# Patient Record
Sex: MALE | Race: WHITE | NOT HISPANIC OR LATINO | Employment: FULL TIME | URBAN - NONMETROPOLITAN AREA
[De-identification: names, ages, dates, MRNs, and addresses within clinical notes are randomized per-mention and may not be internally consistent; named-entity substitution may affect disease eponyms.]

---

## 2017-02-02 ENCOUNTER — APPOINTMENT (OUTPATIENT)
Dept: LAB | Facility: MEDICAL CENTER | Age: 47
End: 2017-02-02
Payer: COMMERCIAL

## 2017-02-02 ENCOUNTER — ALLSCRIPTS OFFICE VISIT (OUTPATIENT)
Dept: OTHER | Facility: OTHER | Age: 47
End: 2017-02-02

## 2017-02-02 ENCOUNTER — TRANSCRIBE ORDERS (OUTPATIENT)
Dept: LAB | Facility: MEDICAL CENTER | Age: 47
End: 2017-02-02

## 2017-02-02 DIAGNOSIS — J06.9 ACUTE UPPER RESPIRATORY INFECTIONS OF OTHER MULTIPLE SITES: Primary | ICD-10-CM

## 2017-02-02 DIAGNOSIS — J06.9 ACUTE UPPER RESPIRATORY INFECTION: ICD-10-CM

## 2017-02-02 DIAGNOSIS — J06.9 ACUTE UPPER RESPIRATORY INFECTIONS OF OTHER MULTIPLE SITES: ICD-10-CM

## 2017-02-02 LAB
FLUAV AG SPEC QL: ABNORMAL
FLUBV AG SPEC QL: ABNORMAL
RSV B RNA SPEC QL NAA+PROBE: DETECTED

## 2017-02-02 PROCEDURE — 87798 DETECT AGENT NOS DNA AMP: CPT

## 2017-02-03 ENCOUNTER — GENERIC CONVERSION - ENCOUNTER (OUTPATIENT)
Dept: OTHER | Facility: OTHER | Age: 47
End: 2017-02-03

## 2017-06-29 ENCOUNTER — ALLSCRIPTS OFFICE VISIT (OUTPATIENT)
Dept: OTHER | Facility: OTHER | Age: 47
End: 2017-06-29

## 2017-11-13 ENCOUNTER — OFFICE VISIT (OUTPATIENT)
Dept: URGENT CARE | Facility: CLINIC | Age: 47
End: 2017-11-13
Payer: COMMERCIAL

## 2017-11-13 PROCEDURE — 99203 OFFICE O/P NEW LOW 30 MIN: CPT

## 2017-11-14 NOTE — PROGRESS NOTES
Assessment    1  BPV (benign positional vertigo) (386 11) (H81 10)    Plan  BPV (benign positional vertigo)    · Meclizine HCl - 25 MG Oral Tablet; 1/2 to 1 tablet every 6-8 hours as needed fordizziness   · Avoid alcoholic beverages ; Status:Complete;   Done: [id number]    Discussion/Summary  Discussion Summary: You should not drive while you are having the vertigo attacks  If symptoms persists past a few days, follow up with an ENT specialist    Medication Side Effects Reviewed: Possible side effects of new medications were reviewed with the patient/guardian today  Understands and agrees with treatment plan: The treatment plan was reviewed with the patient/guardian  The patient/guardian understands and agrees with the treatment plan   Follow Up Instructions: Follow Up with your Primary Care Provider in 4-5 days  If your symptoms worsen, go to the nearest [place] [address] Emergency Department  Chief Complaint    1  Dizziness  Chief Complaint Free Text Note Form: C/O dizziness  Started last night  Positional in nature      History of Present Illness  HPI: C/O dizziness  Started last night  Positional in nature  Started last evening when he woke up in the middle of the night and went from lying to sitting, he felt the room spinning  When he came back from the bathroom he sat down felt okay then laid down and felt the room spinning  This morning he noticed that it is the worse whenever he turns his head to the left  He does not feel lightheaded or like he is going to pass out  He does experience some nausea with the dizziness  Hospital Based Practices Required Assessment:  Abuse And Domestic Violence Screen   Yes, the patient is safe at home  -- The patient states no one is hurting them  Depression And Suicide Screen  No, the patient has not had thoughts of hurting themself  No, the patient has not felt depressed in the past 7 days  Prefered Language is  [place]  Primary Language is  English        Review of Systems  Focused-Male:  Constitutional: no fever or chills, feels well, no tiredness, no recent weight loss or weight gain  ENT: no complaints of earache, no loss of hearing, no nosebleeds or nasal discharge, no sore throat or hoarseness  Cardiovascular: no complaints of slow or fast heart rate, no chest pain, no palpitations, no leg claudication or lower extremity edema  Respiratory: no complaints of shortness of breath, no wheezing or cough, no dyspnea on exertion, no orthopnea or PND  Neurological: dizziness, but-- as noted in HPI  Active Problems    1  Benign non-nodular prostatic hyperplasia with lower urinary tract symptoms (600 91) (N40 1)   2  Benign prostatic hypertrophy (600 00) (N40 0)   3  Coccydynia (724 79) (M53 3)   4  Contact dermatitis due to poison ivy (692 6) (L23 7)   5  Depression screening (V79 0) (Z13 89)   6  Dermatitis (692 9) (L30 9)   7  Erectile dysfunction of non-organic origin (302 72) (F52 21)   8  Fatigue (780 79) (R53 83)   9  High risk medication use (V58 69) (Z79 899)   10  Hyperlipidemia (272 4) (E78 5)   11  Low testosterone (259 9) (E34 9)   12  Nausea (787 02) (R11 0)   13  Need for influenza vaccination (V04 81) (Z23)   14  Tarsal tunnel syndrome, unspecified laterality (355 5) (G57 50)   15  Vitamin D deficiency (268 9) (E55 9)    Past Medical History  1  History of Acute medial meniscus tear of left knee (836 0) (S83 242A)   2  History of Acute upper respiratory infection (465 9) (J06 9)   3  History of Allergy to insects (V15 06) (Z91 038)   4  History of Bee sting (989 5,E905 3) (T63 441A)   5  History of Contact dermatitis (692 9) (L25 9)   6  History of Fatigue (780 79) (R53 83)   7  History of Finger infection (686 9) (L08 9)   8  History of back pain (V13 59) (Z87 39)   9  History of herpes zoster (V12 09) (Z86 19)  Active Problems And Past Medical History Reviewed: The active problems and past medical history were reviewed and updated today        Family History  Mother    1  Family history of Type 2 diabetes mellitus (250 00) (E11 9)  Father    2  Family history of Esophageal Cancer (150 9)   3  Family history of hypertension (V17 49) (Z82 49)   4  Family history of Prostate cancer (80) (C61)  Family History Reviewed: The family history was reviewed and updated today  Social History   · Denied: Drug use (305 90) (F19 90)   · Never a smoker   · Never Drank Alcohol  Social History Reviewed: The social history was reviewed and updated today  Surgical History  1  History of Appendectomy   2  History of Knee Surgery   3  History of Oral Surgery Tooth Extraction   4  History of Tonsillectomy With Adenoidectomy  Surgical History Reviewed: The surgical history was reviewed and updated today  Current Meds   1  No Reported Medications Recorded  Medication List Reviewed: The medication list was reviewed and updated today  Allergies    1  No Known Drug Allergies    Vitals  Signs   Recorded: 06YEH7606 11:23AM   Temperature: 98 2 F  Heart Rate: 68  Respiration: 18  Systolic: 578  Diastolic: 88  Height: 6 ft 2 in  Weight: 228 lb   BMI Calculated: 29 27  BSA Calculated: 2 3  O2 Saturation: 99    Physical Exam   Constitutional  General appearance: No acute distress, well appearing and well nourished  Ears, Nose, Mouth, and Throat  External inspection of ears and nose: Normal    Otoscopic examination: Tympanic membrance translucent with normal light reflex  Canals patent without erythema  Nasal mucosa, septum, and turbinates: Normal without edema or erythema  Oropharynx: Normal with no erythema, edema, exudate or lesions  Pulmonary  Respiratory effort: No increased work of breathing or signs of respiratory distress  Auscultation of lungs: Clear to auscultation  Cardiovascular  Auscultation of heart: Normal rate and rhythm, normal S1 and S2, without murmurs     Neurologic  Sensation: Abnormal  -- Positive Amber-Hallpike whenever he turns his head to the left  Message  Return to work or school:   Maxine Daniels is under my professional care   He was seen in my office on 11/13/17             Signatures   Electronically signed by : CHRISTIAN Khanna ; Nov 13 2017  4:14PM EST                       (Author)

## 2017-12-07 ENCOUNTER — APPOINTMENT (EMERGENCY)
Dept: CT IMAGING | Facility: HOSPITAL | Age: 47
DRG: 694 | End: 2017-12-07
Payer: COMMERCIAL

## 2017-12-07 ENCOUNTER — APPOINTMENT (EMERGENCY)
Dept: ULTRASOUND IMAGING | Facility: HOSPITAL | Age: 47
DRG: 694 | End: 2017-12-07
Payer: COMMERCIAL

## 2017-12-07 ENCOUNTER — HOSPITAL ENCOUNTER (INPATIENT)
Facility: HOSPITAL | Age: 47
LOS: 1 days | Discharge: HOME | DRG: 694 | End: 2017-12-08
Attending: EMERGENCY MEDICINE | Admitting: FAMILY MEDICINE
Payer: COMMERCIAL

## 2017-12-07 ENCOUNTER — APPOINTMENT (EMERGENCY)
Dept: RADIOLOGY | Facility: HOSPITAL | Age: 47
DRG: 694 | End: 2017-12-07
Payer: COMMERCIAL

## 2017-12-07 DIAGNOSIS — N17.9 AKI (ACUTE KIDNEY INJURY) (HCC): ICD-10-CM

## 2017-12-07 DIAGNOSIS — N13.2 URETERAL STONE WITH HYDRONEPHROSIS: ICD-10-CM

## 2017-12-07 DIAGNOSIS — N13.30 HYDRONEPHROSIS: ICD-10-CM

## 2017-12-07 DIAGNOSIS — R77.8 ELEVATED TROPONIN: ICD-10-CM

## 2017-12-07 DIAGNOSIS — N17.9 ACUTE RENAL INJURY (HCC): Primary | ICD-10-CM

## 2017-12-07 DIAGNOSIS — R52 INTRACTABLE PAIN: ICD-10-CM

## 2017-12-07 LAB
ALBUMIN SERPL BCP-MCNC: 3.8 G/DL (ref 3.5–5)
ALP SERPL-CCNC: 86 U/L (ref 46–116)
ALT SERPL W P-5'-P-CCNC: 72 U/L (ref 12–78)
ANION GAP SERPL CALCULATED.3IONS-SCNC: 8 MMOL/L (ref 4–13)
AST SERPL W P-5'-P-CCNC: 30 U/L (ref 5–45)
BACTERIA UR QL AUTO: ABNORMAL /HPF
BASOPHILS # BLD AUTO: 0.03 THOUSANDS/ΜL (ref 0–0.1)
BASOPHILS NFR BLD AUTO: 0 % (ref 0–1)
BILIRUB SERPL-MCNC: 1.1 MG/DL (ref 0.2–1)
BILIRUB UR QL STRIP: NEGATIVE
BUN SERPL-MCNC: 24 MG/DL (ref 5–25)
CALCIUM SERPL-MCNC: 9.2 MG/DL (ref 8.3–10.1)
CHLORIDE SERPL-SCNC: 100 MMOL/L (ref 100–108)
CLARITY UR: CLEAR
CO2 SERPL-SCNC: 29 MMOL/L (ref 21–32)
COLOR UR: YELLOW
CREAT SERPL-MCNC: 2.06 MG/DL (ref 0.6–1.3)
EOSINOPHIL # BLD AUTO: 0.08 THOUSAND/ΜL (ref 0–0.61)
EOSINOPHIL NFR BLD AUTO: 1 % (ref 0–6)
ERYTHROCYTE [DISTWIDTH] IN BLOOD BY AUTOMATED COUNT: 12.9 % (ref 11.6–15.1)
GFR SERPL CREATININE-BSD FRML MDRD: 38 ML/MIN/1.73SQ M
GLUCOSE SERPL-MCNC: 103 MG/DL (ref 65–140)
GLUCOSE UR STRIP-MCNC: NEGATIVE MG/DL
HCT VFR BLD AUTO: 44.2 % (ref 36.5–49.3)
HGB BLD-MCNC: 15.6 G/DL (ref 12–17)
HGB UR QL STRIP.AUTO: NORMAL
KETONES UR STRIP-MCNC: NEGATIVE MG/DL
LACTATE SERPL-SCNC: 1.4 MMOL/L (ref 0.5–2)
LEUKOCYTE ESTERASE UR QL STRIP: NEGATIVE
LIPASE SERPL-CCNC: 112 U/L (ref 73–393)
LYMPHOCYTES # BLD AUTO: 1.52 THOUSANDS/ΜL (ref 0.6–4.47)
LYMPHOCYTES NFR BLD AUTO: 15 % (ref 14–44)
MCH RBC QN AUTO: 31.1 PG (ref 26.8–34.3)
MCHC RBC AUTO-ENTMCNC: 35.3 G/DL (ref 31.4–37.4)
MCV RBC AUTO: 88 FL (ref 82–98)
MONOCYTES # BLD AUTO: 1.11 THOUSAND/ΜL (ref 0.17–1.22)
MONOCYTES NFR BLD AUTO: 11 % (ref 4–12)
NEUTROPHILS # BLD AUTO: 7.4 THOUSANDS/ΜL (ref 1.85–7.62)
NEUTS SEG NFR BLD AUTO: 73 % (ref 43–75)
NITRITE UR QL STRIP: NEGATIVE
NON-SQ EPI CELLS URNS QL MICRO: ABNORMAL /HPF
PH UR STRIP.AUTO: 6 [PH] (ref 4.5–8)
PLATELET # BLD AUTO: 172 THOUSANDS/UL (ref 149–390)
PLATELET # BLD AUTO: 190 THOUSANDS/UL (ref 149–390)
PMV BLD AUTO: 10.7 FL (ref 8.9–12.7)
PMV BLD AUTO: 10.7 FL (ref 8.9–12.7)
POTASSIUM SERPL-SCNC: 4.5 MMOL/L (ref 3.5–5.3)
PROT SERPL-MCNC: 7.5 G/DL (ref 6.4–8.2)
PROT UR STRIP-MCNC: NEGATIVE MG/DL
RBC # BLD AUTO: 5.02 MILLION/UL (ref 3.88–5.62)
RBC #/AREA URNS AUTO: ABNORMAL /HPF
SODIUM SERPL-SCNC: 137 MMOL/L (ref 136–145)
SP GR UR STRIP.AUTO: 1.01 (ref 1–1.03)
TROPONIN I SERPL-MCNC: 0.05 NG/ML
TROPONIN I SERPL-MCNC: 0.05 NG/ML
TROPONIN I SERPL-MCNC: 0.06 NG/ML
UROBILINOGEN UR QL STRIP.AUTO: 0.2 E.U./DL
WBC # BLD AUTO: 10.14 THOUSAND/UL (ref 4.31–10.16)
WBC #/AREA URNS AUTO: ABNORMAL /HPF

## 2017-12-07 PROCEDURE — 83605 ASSAY OF LACTIC ACID: CPT | Performed by: EMERGENCY MEDICINE

## 2017-12-07 PROCEDURE — 96374 THER/PROPH/DIAG INJ IV PUSH: CPT

## 2017-12-07 PROCEDURE — 93005 ELECTROCARDIOGRAM TRACING: CPT | Performed by: EMERGENCY MEDICINE

## 2017-12-07 PROCEDURE — 76870 US EXAM SCROTUM: CPT

## 2017-12-07 PROCEDURE — 81001 URINALYSIS AUTO W/SCOPE: CPT | Performed by: EMERGENCY MEDICINE

## 2017-12-07 PROCEDURE — 82570 ASSAY OF URINE CREATININE: CPT | Performed by: FAMILY MEDICINE

## 2017-12-07 PROCEDURE — 84484 ASSAY OF TROPONIN QUANT: CPT | Performed by: EMERGENCY MEDICINE

## 2017-12-07 PROCEDURE — 85049 AUTOMATED PLATELET COUNT: CPT | Performed by: FAMILY MEDICINE

## 2017-12-07 PROCEDURE — 85025 COMPLETE CBC W/AUTO DIFF WBC: CPT | Performed by: EMERGENCY MEDICINE

## 2017-12-07 PROCEDURE — 83690 ASSAY OF LIPASE: CPT | Performed by: EMERGENCY MEDICINE

## 2017-12-07 PROCEDURE — 99285 EMERGENCY DEPT VISIT HI MDM: CPT

## 2017-12-07 PROCEDURE — 84300 ASSAY OF URINE SODIUM: CPT | Performed by: FAMILY MEDICINE

## 2017-12-07 PROCEDURE — 96375 TX/PRO/DX INJ NEW DRUG ADDON: CPT

## 2017-12-07 PROCEDURE — 36415 COLL VENOUS BLD VENIPUNCTURE: CPT | Performed by: EMERGENCY MEDICINE

## 2017-12-07 PROCEDURE — 96361 HYDRATE IV INFUSION ADD-ON: CPT

## 2017-12-07 PROCEDURE — 70450 CT HEAD/BRAIN W/O DYE: CPT

## 2017-12-07 PROCEDURE — 71020 HB CHEST X-RAY 2VW FRONTAL&LATL: CPT

## 2017-12-07 PROCEDURE — 74176 CT ABD & PELVIS W/O CONTRAST: CPT

## 2017-12-07 PROCEDURE — 84484 ASSAY OF TROPONIN QUANT: CPT | Performed by: FAMILY MEDICINE

## 2017-12-07 PROCEDURE — 80053 COMPREHEN METABOLIC PANEL: CPT | Performed by: EMERGENCY MEDICINE

## 2017-12-07 RX ORDER — ONDANSETRON 2 MG/ML
4 INJECTION INTRAMUSCULAR; INTRAVENOUS EVERY 4 HOURS PRN
Status: DISCONTINUED | OUTPATIENT
Start: 2017-12-07 | End: 2017-12-08 | Stop reason: HOSPADM

## 2017-12-07 RX ORDER — ONDANSETRON 2 MG/ML
4 INJECTION INTRAMUSCULAR; INTRAVENOUS ONCE
Status: COMPLETED | OUTPATIENT
Start: 2017-12-07 | End: 2017-12-07

## 2017-12-07 RX ORDER — SODIUM CHLORIDE 9 MG/ML
100 INJECTION, SOLUTION INTRAVENOUS CONTINUOUS
Status: DISCONTINUED | OUTPATIENT
Start: 2017-12-07 | End: 2017-12-08 | Stop reason: HOSPADM

## 2017-12-07 RX ORDER — ACETAMINOPHEN 325 MG/1
650 TABLET ORAL EVERY 6 HOURS PRN
Status: DISCONTINUED | OUTPATIENT
Start: 2017-12-07 | End: 2017-12-08 | Stop reason: HOSPADM

## 2017-12-07 RX ORDER — CEFACLOR 250 MG
250 CAPSULE ORAL 3 TIMES DAILY
COMMUNITY
End: 2017-12-08 | Stop reason: HOSPADM

## 2017-12-07 RX ORDER — TAMSULOSIN HYDROCHLORIDE 0.4 MG/1
0.4 CAPSULE ORAL
Status: DISCONTINUED | OUTPATIENT
Start: 2017-12-07 | End: 2017-12-08 | Stop reason: HOSPADM

## 2017-12-07 RX ORDER — MAGNESIUM SULFATE HEPTAHYDRATE 40 MG/ML
2 INJECTION, SOLUTION INTRAVENOUS ONCE
Status: COMPLETED | OUTPATIENT
Start: 2017-12-07 | End: 2017-12-07

## 2017-12-07 RX ORDER — HEPARIN SODIUM 5000 [USP'U]/ML
5000 INJECTION, SOLUTION INTRAVENOUS; SUBCUTANEOUS EVERY 8 HOURS SCHEDULED
Status: DISCONTINUED | OUTPATIENT
Start: 2017-12-07 | End: 2017-12-08 | Stop reason: HOSPADM

## 2017-12-07 RX ORDER — KETOROLAC TROMETHAMINE 10 MG/1
10 TABLET, FILM COATED ORAL EVERY 6 HOURS PRN
COMMUNITY
End: 2017-12-08 | Stop reason: HOSPADM

## 2017-12-07 RX ADMIN — HYDROMORPHONE HYDROCHLORIDE 1 MG: 1 INJECTION, SOLUTION INTRAMUSCULAR; INTRAVENOUS; SUBCUTANEOUS at 23:26

## 2017-12-07 RX ADMIN — TAMSULOSIN HYDROCHLORIDE 0.4 MG: 0.4 CAPSULE ORAL at 17:48

## 2017-12-07 RX ADMIN — SODIUM CHLORIDE 1000 ML: 0.9 INJECTION, SOLUTION INTRAVENOUS at 15:22

## 2017-12-07 RX ADMIN — HEPARIN SODIUM 5000 UNITS: 5000 INJECTION, SOLUTION INTRAVENOUS; SUBCUTANEOUS at 22:13

## 2017-12-07 RX ADMIN — ACETAMINOPHEN 650 MG: 325 TABLET, FILM COATED ORAL at 19:43

## 2017-12-07 RX ADMIN — SODIUM CHLORIDE 100 ML/HR: 0.9 INJECTION, SOLUTION INTRAVENOUS at 19:44

## 2017-12-07 RX ADMIN — HYDROMORPHONE HYDROCHLORIDE 1 MG: 1 INJECTION, SOLUTION INTRAMUSCULAR; INTRAVENOUS; SUBCUTANEOUS at 17:10

## 2017-12-07 RX ADMIN — MAGNESIUM SULFATE HEPTAHYDRATE 2 G: 40 INJECTION, SOLUTION INTRAVENOUS at 19:44

## 2017-12-07 RX ADMIN — HYDROMORPHONE HYDROCHLORIDE 1 MG: 1 INJECTION, SOLUTION INTRAMUSCULAR; INTRAVENOUS; SUBCUTANEOUS at 15:22

## 2017-12-07 RX ADMIN — ONDANSETRON 4 MG: 2 INJECTION INTRAMUSCULAR; INTRAVENOUS at 15:22

## 2017-12-07 NOTE — ED NOTES
Pt transported to [address] [place] [address] [address] at this time        Ijeoma Copeland, RN  12/07/17 4619

## 2017-12-07 NOTE — ED NOTES
Pt in bed resting comfortably no complaints at this time  Call bell in reach   Side rails up x2     Keith Haynes RN  12/07/17 7937

## 2017-12-07 NOTE — PROGRESS NOTES
56year old male with a known right sided stone diagnosed at an outside hospital   Presents today with abdominal pain  CT scan demonstrates 3 mm right distal stone with resultant hydroureteronephrosis and likely forniceal rupture  The patient has having some pain which is controlled with medications  He is afebrile  His white count is 10 but his creatinine is slightly elevated at 2  He will be admitted for IV fluid hydration  His last and full solid meal was at 3:00 p m  and there are no urgent or emergent signs at this time to prompt emergent decompression  The patient will be kept NPO after midnight and decision about stenting will be reviewed tomorrow  If the patient becomes febrile or demonstrate any signs of sepsis, Urology should be contacted immediately  His urine does not show signs of infection and the patient will not be started on antibiotics at this time   Reviewed plan with Dr Edward Sweeney who will convey to [place] team

## 2017-12-07 NOTE — H&P
H&P Exam - Magaly Salinas 56 y o  male MRN: [id number]    Unit/Bed#: RM04 Encounter: [id number]    Assessment:    Right Hydroureteronephrosis and probable calyx rupture  Case discussed with urology  Pain control, IVF, flomax  NPO after midnight for possible stenting tomorrow   If he develops any febrile episodes or signs of sepsis Urology will be contacted immediately, empiric antibiotics and blood cultures to be drawn  Will also start Flomax   Per urology no seo indicated for now     YVONNE   Likely 2/2 #1  IVF, obtain urine sodium and creatinine     Elevated troponin  Likely demand ischemia will trend x 2 and obtain echocardiogram in am  EKG reviewed non ischemic, but shows posterior fasicular block and NSR     Liver lesion  RUQ Ultrasound    Migraine headache  Will trial 2 gram Magnesium, IVF,  Tylenol      LOS > 2 midnights  Full Code  Heparin DVT Prophylaxis       History of Present Illness      The patient is a pleasant 56year old male with no significant pmh who presents today with a chief complaint of right sided flank pain  He was seen in [place] on Monday and was noted to have a 3mm non obstructing stone, he was sent home on cefaclor and ketoralc  He's denying any fevers or chills, had some nausea but no vomiting  Complains of not being able to find comfortable position, pain respond well to dilaudid and he's currently resting comfortably  Case was discussed with ER physician and reviewed with Urology  Review of Systems   Gastrointestinal: Positive for nausea  Musculoskeletal: Positive for back pain  Historical Information   History reviewed  No pertinent past medical history    Past Surgical History:   Procedure Laterality Date    APPENDECTOMY      TONSILLECTOMY      WISDOM TOOTH EXTRACTION       Social History   History   Alcohol Use No     History   Drug Use No     History   Smoking Status    Never Smoker   Smokeless Tobacco    Never Used     Family History: non-contributory    Meds/Allergies   all medications and allergies reviewed  No Known Allergies    Objective   First Vitals:   Blood Pressure: (!) 171/98 (12/07/17 1449)  Pulse: 67 (12/07/17 1449)  Temperature: 98 4 °F (36 9 °C) (12/07/17 1449)  Temp Source: Temporal (12/07/17 1449)  Respirations: 20 (12/07/17 1449)  Height: 6' 2" (188 cm) (12/07/17 1449)  Weight - Scale: 103 kg (227 lb 8 oz) (12/07/17 1449)  SpO2: 100 % (12/07/17 1449)    Current Vitals:   Blood Pressure: (!) 171/98 (12/07/17 1449)  Pulse: 65 (12/07/17 1715)  Temperature: 98 4 °F (36 9 °C) (12/07/17 1449)  Temp Source: Temporal (12/07/17 1449)  Respirations: 21 (12/07/17 1715)  Height: 6' 2" (188 cm) (12/07/17 1449)  Weight - Scale: 103 kg (227 lb 8 oz) (12/07/17 1449)  SpO2: 100 % (12/07/17 1715)    No intake or output data in the 24 hours ending 12/07/17 1746    Invasive Devices     Peripheral Intravenous Line            Peripheral IV 12/07/17 Left;Ventral (anterior) Antecubital less than 1 day                Physical Exam   Constitutional: He is oriented to person, place, and time  He appears well-developed  No distress  HENT:   Head: Normocephalic and atraumatic  Eyes: No scleral icterus  Neck: Neck supple  No JVD present  Cardiovascular: Normal rate and regular rhythm  No murmur heard  Pulmonary/Chest: Effort normal and breath sounds normal  No respiratory distress  He has no wheezes  He has no rales  Abdominal: Soft  Bowel sounds are normal  He exhibits no distension  There is no tenderness  There is no rebound  Musculoskeletal: Normal range of motion  He exhibits no edema  Right CVA tenderness    Neurological: He is alert and oriented to person, place, and time  No cranial nerve deficit  Coordination normal        Lab Results:   Lab Results   Component Value Date    WBC 10 14 12/07/2017    HGB 15 6 12/07/2017    HCT 44 2 12/07/2017    MCV 88 12/07/2017     12/07/2017       [facility]    Imaging:   CT abdomen pelvis wo contrast   Final Result      3 mm obstructing right distal ureteral calculus is identified causing right hydroureteronephrosis  There is significant stranding /fluid about the right kidney within the perinephric space extending inferiorly along the right    retroperitoneum/extraperitoneal to the right psoas and iliopsoas muscle  These findings could relate to edema from a ruptured calyx or relate to infectious/inflammatory process such as pyelonephritis/ureteritis  Correlate clinically  No free air or lymphadenopathy  Trace amount of free fluid noted in the pelvis is nonspecific but abnormal for a male patient  Incomplete characterized 3 4 cm right hepatic lobe hypodense lesion, correlate with right upper quadrant ultrasound  Workstation performed: [id number]         XR chest 2 views   ED Interpretation   no infiltrate       Final Result      No active pulmonary disease  Workstation performed: [id number]         CT head without contrast   Final Result      No acute intracranial abnormality  Workstation performed: [id number]         US scrotum and testicles   Final Result       1  No acute scrotal findings  Specifically, no evidence for torsion  2   Isolated right-sided varicocele  Classical teaching says that unilateral right varicocele should raise concern for intra-abdominal mass compressing the IVC (though this is rarely the case, in practice)  Recommend obtaining the results from the    patient's recent outside CT scan             Workstation performed: [id number]             EKG, Pathology, and Other Studies: NSR Left posterior fasicular block     Code Status: No Order  Advance Directive and Living Will:      Power of :    POLST:      Counseling / Coordination of Care:

## 2017-12-07 NOTE — ED PROVIDER NOTES
History  Chief Complaint   Patient presents with    Dizziness     Dizzy and weak since last night, blurred vision    Abdominal Pain     RLQ pain, was seen in ED and dx with kidney stone, increased pain now     52-year-old male presents with multiple complaints today  Patient states that on Monday evening (4 days ago) he began with pain in the right flank which radiated into his right testicle  He states that he was seen at [facility] for this had a testicular ultrasound which was negative and a CT scan which demonstrated a 2 mm kidney stone in his right ureter  The patient also states that later that night into Tuesday he started with a headache  Patient does have history of migraines and states this is similar to previous migraines  For who the headache is making him feel dizzy and light sensitive  He has no focal neurological deficit  History provided by:  Patient  Dizziness   Description: Headache  Duration:  4 days  Timing:  Constant  Chronicity:  Recurrent  Associated symptoms: headaches    Associated symptoms: no chest pain    Abdominal Pain   Pain location:  RLQ  Pain quality: aching, cramping and pressure    Pain radiates to:  RLQ and scrotum  Pain severity:  Moderate  Onset quality:  Gradual  Duration:  4 days  Timing:  Constant  Progression:  Worsening  Chronicity:  New  Context: not alcohol use, not awakening from sleep, not diet changes, not eating and not laxative use    Worsened by:  Nothing  Ineffective treatments:  Not moving and OTC medications  Associated symptoms: no anorexia, no chest pain, no chills and no dysuria    Risk factors: no alcohol abuse, no aspirin use and not elderly        Prior to Admission Medications   Prescriptions Last Dose Informant Patient Reported? Taking?    cefaclor (CECLOR) 250 mg capsule   Yes Yes   Sig: Take 250 mg by mouth 3 (three) times a day   ketorolac (TORADOL) 10 mg tablet 12/7/2017 at 0600  Yes Yes   Sig: Take 10 mg by mouth every 6 (six) hours as needed for moderate pain      Facility-Administered Medications: None       History reviewed  No pertinent past medical history  Past Surgical History:   Procedure Laterality Date    APPENDECTOMY      TONSILLECTOMY      WISDOM TOOTH EXTRACTION         History reviewed  No pertinent family history  I have reviewed and agree with the history as documented  Social History   Substance Use Topics    Smoking status: Never Smoker    Smokeless tobacco: Never Used    Alcohol use No        Review of Systems   Constitutional: Negative for activity change, appetite change, chills and diaphoresis  HENT: Negative for congestion, dental problem, drooling and ear discharge  Eyes: Negative for pain, discharge and itching  Respiratory: Negative for apnea, choking and chest tightness  Cardiovascular: Negative for chest pain and leg swelling  Gastrointestinal: Positive for abdominal pain  Negative for anorexia  Endocrine: Negative for cold intolerance, heat intolerance and polydipsia  Genitourinary: Positive for testicular pain  Negative for difficulty urinating, dysuria, enuresis, flank pain and scrotal swelling  Musculoskeletal: Negative for arthralgias, back pain and gait problem  Skin: Negative for color change and pallor  Allergic/Immunologic: Negative for environmental allergies and food allergies  Neurological: Positive for headaches  Negative for dizziness  Hematological: Negative for adenopathy  Psychiatric/Behavioral: Negative for agitation, behavioral problems, confusion and decreased concentration         Physical Exam  ED Triage Vitals [12/07/17 1449]   Temperature Pulse Respirations Blood Pressure SpO2   98 4 °F (36 9 °C) 67 20 (!) 171/98 100 %      Temp Source Heart Rate Source Patient Position - Orthostatic VS BP Location FiO2 (%)   Temporal Monitor Lying Left arm --      Pain Score       9           Orthostatic Vital Signs  Vitals:    12/07/17 1515 12/07/17 1530 12/07/17 1545 12/07/17 1700   BP:       Pulse: 71 73 65 59   Patient Position - Orthostatic VS:           Physical Exam   Constitutional: He appears well-developed and well-nourished  HENT:   Head: Normocephalic and atraumatic  Right Ear: External ear normal    Left Ear: External ear normal    Eyes: Pupils are equal, round, and reactive to light  Right eye exhibits no discharge  Left eye exhibits no discharge  Neck: Normal range of motion  No JVD present  No tracheal deviation present  No thyromegaly present  Cardiovascular: Normal rate, regular rhythm and normal heart sounds  Exam reveals no friction rub  No murmur heard  Pulmonary/Chest: Effort normal  No respiratory distress  He has no wheezes  Abdominal: He exhibits no distension, no pulsatile liver, no fluid wave and no ascites  There is no hepatosplenomegaly  There is tenderness in the right lower quadrant  There is CVA tenderness  Hernia confirmed negative in the right inguinal area and confirmed negative in the left inguinal area  Genitourinary: Cremasteric reflex is present  Right testis shows tenderness  Right testis shows no mass and no swelling  Cremasteric reflex is not absent on the right side  Circumcised  Musculoskeletal: Normal range of motion  He exhibits no edema  Neurological: He is alert  He displays normal reflexes  No cranial nerve deficit  Coordination normal    Skin: Skin is warm  Capillary refill takes less than 2 seconds  No erythema  No pallor  Psychiatric: He has a normal mood and affect  His behavior is normal  Thought content normal    Vitals reviewed        ED Medications  Medications   sodium chloride 0 9 % bolus 1,000 mL (0 mL Intravenous Stopped 12/7/17 1709)   ondansetron (ZOFRAN) injection 4 mg (4 mg Intravenous Given 12/7/17 1522)   HYDROmorphone (DILAUDID) 1 mg/mL injection 1 mg (1 mg Intravenous Given 12/7/17 1522)   HYDROmorphone (DILAUDID) 1 mg/mL injection 1 mg (1 mg Intravenous Given 12/7/17 1710) Diagnostic Studies  Results Reviewed     Procedure Component Value Units Date/Time    Urine Microscopic [[id number]]  (Abnormal) Collected:  12/07/17 1636    Lab Status:  Final result Specimen:  Urine from Urine, Clean Catch Updated:  12/07/17 1659     RBC, UA 0-1 (A) /hpf      WBC, UA None Seen /hpf      Epithelial Cells Occasional /hpf      Bacteria, UA Occasional /hpf     UA w Reflex to Microscopic w Reflex to Culture [[id number]]  (Normal) Collected:  12/07/17 1636    Lab Status:  Final result Specimen:  Urine from Urine, Clean Catch Updated:  12/07/17 1651     Color, UA Yellow     Clarity, UA Clear     Specific Gravity, UA 1 010     pH, UA 6 0     Leukocytes, UA Negative     Nitrite, UA Negative     Protein, UA Negative mg/dl      Glucose, UA Negative mg/dl      Ketones, UA Negative mg/dl      Urobilinogen, UA 0 2 E U /dl      Bilirubin, UA Negative     Blood, UA Trace-Intact    Troponin I [[id number]]  (Abnormal) Collected:  12/07/17 1518    Lab Status:  Final result Specimen:  Blood from Arm, Left Updated:  12/07/17 1608     Troponin I 0 05 (HH) ng/mL     Narrative:         [employer] Chemistry analyzer 99% cutoff is > 0 04 ng/mL in network labs    o cTnI 99% cutoff is useful only when applied to patients in the clinical setting of myocardial ischemia  o cTnI 99% cutoff should be interpreted in the context of clinical history, ECG findings and possibly cardiac imaging to establish correct diagnosis  o cTnI 99% cutoff may be suggestive but clearly not indicative of a coronary event without the clinical setting of myocardial ischemia  Lactic acid, plasma [[id number]]  (Normal) Collected:  12/07/17 1518    Lab Status:  Final result Specimen:  Blood from Arm, Left Updated:  12/07/17 1549     LACTIC ACID 1 4 mmol/L     Narrative:         Result may be elevated if tourniquet was used during collection      Comprehensive metabolic panel [[id number]]  (Abnormal) Collected:  12/07/17 1518    Lab Status:  Final result Specimen:  Blood from Arm, Left Updated:  12/07/17 1546     Sodium 137 mmol/L      Potassium 4 5 mmol/L      Chloride 100 mmol/L      CO2 29 mmol/L      Anion Gap 8 mmol/L      BUN 24 mg/dL      Creatinine 2 06 (H) mg/dL      Glucose 103 mg/dL      Calcium 9 2 mg/dL      AST 30 U/L      ALT 72 U/L      Alkaline Phosphatase 86 U/L      Total Protein 7 5 g/dL      Albumin 3 8 g/dL      Total Bilirubin 1 10 (H) mg/dL      eGFR 38 ml/min/1 73sq m     Narrative:         National Kidney Disease Education Program recommendations are as follows:  GFR calculation is accurate only with a steady state creatinine  Chronic Kidney disease less than 60 ml/min/1 73 sq  meters  Kidney failure less than 15 ml/min/1 73 sq  meters  Lipase [[id number]]  (Normal) Collected:  12/07/17 1518    Lab Status:  Final result Specimen:  Blood from Arm, Left Updated:  12/07/17 1546     Lipase 112 u/L     CBC and differential [[id number]]  (Normal) Collected:  12/07/17 1518    Lab Status:  Final result Specimen:  Blood from Arm, Left Updated:  12/07/17 1530     WBC 10 14 Thousand/uL      RBC 5 02 Million/uL      Hemoglobin 15 6 g/dL      Hematocrit 44 2 %      MCV 88 fL      MCH 31 1 pg      MCHC 35 3 g/dL      RDW 12 9 %      MPV 10 7 fL      Platelets 745 Thousands/uL      Neutrophils Relative 73 %      Lymphocytes Relative 15 %      Monocytes Relative 11 %      Eosinophils Relative 1 %      Basophils Relative 0 %      Neutrophils Absolute 7 40 Thousands/µL      Lymphocytes Absolute 1 52 Thousands/µL      Monocytes Absolute 1 11 Thousand/µL      Eosinophils Absolute 0 08 Thousand/µL      Basophils Absolute 0 03 Thousands/µL                  CT abdomen pelvis wo contrast   Final Result by Zhane Arreola MD (12/07 1659)      3 mm obstructing right distal ureteral calculus is identified causing right hydroureteronephrosis   There is significant stranding /fluid about the right kidney within the perinephric space extending inferiorly along the right retroperitoneum/extraperitoneal to the right psoas and iliopsoas muscle  These findings could relate to edema from a ruptured calyx or relate to infectious/inflammatory process such as pyelonephritis/ureteritis  Correlate clinically  No free air or lymphadenopathy  Trace amount of free fluid noted in the pelvis is nonspecific but abnormal for a male patient  Incomplete characterized 3 4 cm right hepatic lobe hypodense lesion, correlate with right upper quadrant ultrasound  Workstation performed: [id number]         XR chest 2 views   ED Interpretation by Isaac Escalera DO (12/07 1654)   no infiltrate       Final Result by Baron Martell MD (12/07 1652)      No active pulmonary disease  Workstation performed: [id number]         CT head without contrast   Final Result by Christen Garcia MD (12/07 1651)      No acute intracranial abnormality  Workstation performed: [id number]         US scrotum and testicles   Final Result by Baljit Mack MD (12/07 1638)       1  No acute scrotal findings  Specifically, no evidence for torsion  2   Isolated right-sided varicocele  Classical teaching says that unilateral right varicocele should raise concern for intra-abdominal mass compressing the IVC (though this is rarely the case, in practice)  Recommend obtaining the results from the    patient's recent outside CT scan             Workstation performed: [id number]                    Procedures  Procedures       Phone Contacts  ED Phone Contact    ED Course  ED Course as of Dec 07 1713   Thu Dec 07, 2017   1656 CT abdomen pelvis wo contrast         HEART Risk Score    Flowsheet Row Most Recent Value   History  1 Filed at: 12/07/2017 1506   ECG  1 Filed at: 12/07/2017 1506   Age  1 Filed at: 12/07/2017 1506   Risk Factors  0 Filed at: 12/07/2017 1506   Troponin  0 Filed at: 12/07/2017 1506   Heart Score Risk Calculator   History  1 Filed at: 12/07/2017 1506   ECG  1 Filed at: 12/07/2017 1506   Age  1 Filed at: 12/07/2017 1506   Risk Factors  0 Filed at: 12/07/2017 1506   Troponin  0 Filed at: 12/07/2017 1506   HEART Score  3 Filed at: 12/07/2017 1506   HEART Score  3 Filed at: 12/07/2017 1506                            MDM  Number of Diagnoses or Management Options  Acute renal injury [facility]):   Elevated troponin:   Diagnosis management comments: Differential diagnosis 1  Ureterolithiasis 2  Nephrolithiasis 3  UTI 4  Testicular torsion 5  CVA TIA  I will check labs CTA head and neck to evaluate for intracranial or thrombotic issue, testicular ultrasound to evaluate for torsion, check urine for UTI, noncontrast CT scan the abdomen pelvis evaluate for kidney stone and/or for intra-abdominal etiology    Patient's previous BUN and creatinine were BUN of 22 creatinine 1 6 on Monday December 4, 2017    I spoke with Dr Areli Ward we will admit and monitor        Amount and/or Complexity of Data Reviewed  Clinical lab tests: reviewed  Tests in the radiology section of CPT®: reviewed  Tests in the medicine section of CPT®: reviewed  Obtain history from someone other than the patient: (Old records were obtained and reviewed from [facility]   Patient was diagnosed with a obstructing 2 mm stone on the right with daylin ureteral stranding  Urinalysis did not show infectious process    White blood cell count was normal  Patient was sent home on Keflex Toradol and Percocet , patient is scheduled see Urology tomorrow 12/9/2017)    Risk of Complications, Morbidity, and/or Mortality  Presenting problems: high  Diagnostic procedures: high  Management options: high      CritCare Time    Disposition  Final diagnoses:   Acute renal injury ([address] )   Elevated troponin   Hydronephrosis   Intractable pain     Time reflects when diagnosis was documented in both MDM as applicable and the Disposition within this note     Time User Action Codes Description Comment    12/7/2017  4:09 PM Jason Mark [N17 9] Acute renal injury ([facility] [address] )     12/7/2017  4:09 PM Zhane Narvaez Add [R74 8] Elevated troponin     12/7/2017  5:13 PM Zhane Mark [N13 30] Hydronephrosis     12/7/2017  5:13 PM Nasim Escobedo Add [R52] Intractable pain       ED Disposition     ED Disposition Condition Comment    Admit          Follow-up Information    None       Patient's Medications   Discharge Prescriptions    No medications on file     No discharge procedures on file      ED Provider  Electronically Signed by           Jeffry Garcia DO  12/07/17 7530

## 2017-12-08 ENCOUNTER — APPOINTMENT (INPATIENT)
Dept: ULTRASOUND IMAGING | Facility: HOSPITAL | Age: 47
DRG: 694 | End: 2017-12-08
Payer: COMMERCIAL

## 2017-12-08 ENCOUNTER — ANESTHESIA (INPATIENT)
Dept: PERIOP | Facility: HOSPITAL | Age: 47
DRG: 694 | End: 2017-12-08
Payer: COMMERCIAL

## 2017-12-08 ENCOUNTER — ANESTHESIA EVENT (INPATIENT)
Dept: PERIOP | Facility: HOSPITAL | Age: 47
DRG: 694 | End: 2017-12-08
Payer: COMMERCIAL

## 2017-12-08 ENCOUNTER — APPOINTMENT (INPATIENT)
Dept: RADIOLOGY | Facility: HOSPITAL | Age: 47
DRG: 694 | End: 2017-12-08
Payer: COMMERCIAL

## 2017-12-08 ENCOUNTER — APPOINTMENT (INPATIENT)
Dept: NON INVASIVE DIAGNOSTICS | Facility: HOSPITAL | Age: 47
DRG: 694 | End: 2017-12-08
Payer: COMMERCIAL

## 2017-12-08 VITALS
RESPIRATION RATE: 18 BRPM | OXYGEN SATURATION: 97 % | WEIGHT: 223.11 LBS | BODY MASS INDEX: 28.63 KG/M2 | DIASTOLIC BLOOD PRESSURE: 79 MMHG | HEART RATE: 78 BPM | TEMPERATURE: 98 F | SYSTOLIC BLOOD PRESSURE: 161 MMHG | HEIGHT: 74 IN

## 2017-12-08 PROBLEM — N13.2 URETERAL STONE WITH HYDRONEPHROSIS: Status: ACTIVE | Noted: 2017-12-08

## 2017-12-08 PROBLEM — R79.89 ELEVATED TROPONIN: Status: ACTIVE | Noted: 2017-12-08

## 2017-12-08 PROBLEM — N13.30 HYDRONEPHROSIS: Status: RESOLVED | Noted: 2017-12-07 | Resolved: 2017-12-08

## 2017-12-08 PROBLEM — N13.30 HYDRONEPHROSIS: Status: ACTIVE | Noted: 2017-12-07

## 2017-12-08 PROBLEM — R79.89 ELEVATED TROPONIN: Status: RESOLVED | Noted: 2017-12-08 | Resolved: 2017-12-08

## 2017-12-08 PROBLEM — R52 INTRACTABLE PAIN: Status: ACTIVE | Noted: 2017-12-07

## 2017-12-08 PROBLEM — R77.8 ELEVATED TROPONIN: Status: RESOLVED | Noted: 2017-12-08 | Resolved: 2017-12-08

## 2017-12-08 PROBLEM — K76.9 HEPATIC LESION: Status: ACTIVE | Noted: 2017-12-08

## 2017-12-08 PROBLEM — N17.9 AKI (ACUTE KIDNEY INJURY) (HCC): Status: ACTIVE | Noted: 2017-12-08

## 2017-12-08 PROBLEM — R77.8 ELEVATED TROPONIN: Status: ACTIVE | Noted: 2017-12-08

## 2017-12-08 PROBLEM — I15.8 OTHER SECONDARY HYPERTENSION: Status: ACTIVE | Noted: 2017-12-08

## 2017-12-08 PROBLEM — R52 INTRACTABLE PAIN: Status: RESOLVED | Noted: 2017-12-07 | Resolved: 2017-12-08

## 2017-12-08 LAB
ALBUMIN SERPL BCP-MCNC: 3.2 G/DL (ref 3.5–5)
ALP SERPL-CCNC: 82 U/L (ref 46–116)
ALT SERPL W P-5'-P-CCNC: 60 U/L (ref 12–78)
ANION GAP SERPL CALCULATED.3IONS-SCNC: 7 MMOL/L (ref 4–13)
AST SERPL W P-5'-P-CCNC: 26 U/L (ref 5–45)
ATRIAL RATE: 68 BPM
BILIRUB SERPL-MCNC: 1.1 MG/DL (ref 0.2–1)
BUN SERPL-MCNC: 19 MG/DL (ref 5–25)
CALCIUM SERPL-MCNC: 8.3 MG/DL (ref 8.3–10.1)
CHLORIDE SERPL-SCNC: 103 MMOL/L (ref 100–108)
CO2 SERPL-SCNC: 28 MMOL/L (ref 21–32)
CREAT SERPL-MCNC: 1.6 MG/DL (ref 0.6–1.3)
CREAT UR-MCNC: 128 MG/DL
ERYTHROCYTE [DISTWIDTH] IN BLOOD BY AUTOMATED COUNT: 12.7 % (ref 11.6–15.1)
GFR SERPL CREATININE-BSD FRML MDRD: 51 ML/MIN/1.73SQ M
GLUCOSE SERPL-MCNC: 97 MG/DL (ref 65–140)
HCT VFR BLD AUTO: 40.1 % (ref 36.5–49.3)
HGB BLD-MCNC: 14.1 G/DL (ref 12–17)
INR PPP: 1.05 (ref 0.86–1.16)
MAGNESIUM SERPL-MCNC: 2.3 MG/DL (ref 1.6–2.6)
MCH RBC QN AUTO: 31.5 PG (ref 26.8–34.3)
MCHC RBC AUTO-ENTMCNC: 35.2 G/DL (ref 31.4–37.4)
MCV RBC AUTO: 90 FL (ref 82–98)
P AXIS: 33 DEGREES
PHOSPHATE SERPL-MCNC: 4.2 MG/DL (ref 2.7–4.5)
PLATELET # BLD AUTO: 167 THOUSANDS/UL (ref 149–390)
PMV BLD AUTO: 10.8 FL (ref 8.9–12.7)
POTASSIUM SERPL-SCNC: 4.6 MMOL/L (ref 3.5–5.3)
PR INTERVAL: 132 MS
PROT SERPL-MCNC: 6.4 G/DL (ref 6.4–8.2)
PROTHROMBIN TIME: 13.6 SECONDS (ref 12.1–14.4)
QRS AXIS: 117 DEGREES
QRSD INTERVAL: 90 MS
QT INTERVAL: 386 MS
QTC INTERVAL: 410 MS
RBC # BLD AUTO: 4.48 MILLION/UL (ref 3.88–5.62)
SODIUM 24H UR-SCNC: 75 MOL/L
SODIUM SERPL-SCNC: 138 MMOL/L (ref 136–145)
T WAVE AXIS: 14 DEGREES
TROPONIN I SERPL-MCNC: 0.06 NG/ML
VENTRICULAR RATE: 68 BPM
WBC # BLD AUTO: 7.97 THOUSAND/UL (ref 4.31–10.16)

## 2017-12-08 PROCEDURE — BT1DYZZ FLUOROSCOPY OF RIGHT KIDNEY, URETER AND BLADDER USING OTHER CONTRAST: ICD-10-PCS | Performed by: UROLOGY

## 2017-12-08 PROCEDURE — 84100 ASSAY OF PHOSPHORUS: CPT | Performed by: FAMILY MEDICINE

## 2017-12-08 PROCEDURE — 84484 ASSAY OF TROPONIN QUANT: CPT | Performed by: FAMILY MEDICINE

## 2017-12-08 PROCEDURE — 76705 ECHO EXAM OF ABDOMEN: CPT

## 2017-12-08 PROCEDURE — 85027 COMPLETE CBC AUTOMATED: CPT | Performed by: FAMILY MEDICINE

## 2017-12-08 PROCEDURE — C2617 STENT, NON-COR, TEM W/O DEL: HCPCS | Performed by: UROLOGY

## 2017-12-08 PROCEDURE — 83735 ASSAY OF MAGNESIUM: CPT | Performed by: FAMILY MEDICINE

## 2017-12-08 PROCEDURE — 74000 HB X-RAY EXAM OF ABDOMEN (SINGLE ANTEROPOSTERIOR VIEW): CPT

## 2017-12-08 PROCEDURE — 74420 UROGRAPHY RTRGR +-KUB: CPT

## 2017-12-08 PROCEDURE — 85610 PROTHROMBIN TIME: CPT | Performed by: FAMILY MEDICINE

## 2017-12-08 PROCEDURE — 93306 TTE W/DOPPLER COMPLETE: CPT

## 2017-12-08 PROCEDURE — 0T768DZ DILATION OF RIGHT URETER WITH INTRALUMINAL DEVICE, VIA NATURAL OR ARTIFICIAL OPENING ENDOSCOPIC: ICD-10-PCS | Performed by: UROLOGY

## 2017-12-08 PROCEDURE — 87086 URINE CULTURE/COLONY COUNT: CPT | Performed by: UROLOGY

## 2017-12-08 PROCEDURE — 80053 COMPREHEN METABOLIC PANEL: CPT | Performed by: FAMILY MEDICINE

## 2017-12-08 PROCEDURE — C1769 GUIDE WIRE: HCPCS | Performed by: UROLOGY

## 2017-12-08 DEVICE — INLAY URETERAL STENT W/O GUIDEWIRE
Type: IMPLANTABLE DEVICE | Site: URINARY BLADDER | Status: NON-FUNCTIONAL
Brand: BARD® INLAY® URETERAL STENT
Removed: 2018-01-02

## 2017-12-08 RX ORDER — HYDROCODONE BITARTRATE AND ACETAMINOPHEN 5; 325 MG/1; MG/1
2 TABLET ORAL EVERY 6 HOURS PRN
Qty: 15 TABLET | Refills: 0 | Status: SHIPPED | OUTPATIENT
Start: 2017-12-08 | End: 2017-12-18

## 2017-12-08 RX ORDER — LIDOCAINE HYDROCHLORIDE 10 MG/ML
INJECTION, SOLUTION INFILTRATION; PERINEURAL AS NEEDED
Status: DISCONTINUED | OUTPATIENT
Start: 2017-12-08 | End: 2017-12-08 | Stop reason: SURG

## 2017-12-08 RX ORDER — PROPOFOL 10 MG/ML
INJECTION, EMULSION INTRAVENOUS AS NEEDED
Status: DISCONTINUED | OUTPATIENT
Start: 2017-12-08 | End: 2017-12-08 | Stop reason: SURG

## 2017-12-08 RX ORDER — SODIUM CHLORIDE 9 MG/ML
INJECTION, SOLUTION INTRAVENOUS CONTINUOUS PRN
Status: DISCONTINUED | OUTPATIENT
Start: 2017-12-08 | End: 2017-12-08 | Stop reason: SURG

## 2017-12-08 RX ORDER — BUTALBITAL, ACETAMINOPHEN AND CAFFEINE 50; 325; 40 MG/1; MG/1; MG/1
1 TABLET ORAL ONCE
Status: COMPLETED | OUTPATIENT
Start: 2017-12-08 | End: 2017-12-08

## 2017-12-08 RX ORDER — MIDAZOLAM HYDROCHLORIDE 1 MG/ML
INJECTION INTRAMUSCULAR; INTRAVENOUS AS NEEDED
Status: DISCONTINUED | OUTPATIENT
Start: 2017-12-08 | End: 2017-12-08 | Stop reason: SURG

## 2017-12-08 RX ORDER — FENTANYL CITRATE 50 UG/ML
INJECTION, SOLUTION INTRAMUSCULAR; INTRAVENOUS AS NEEDED
Status: DISCONTINUED | OUTPATIENT
Start: 2017-12-08 | End: 2017-12-08 | Stop reason: SURG

## 2017-12-08 RX ORDER — TAMSULOSIN HYDROCHLORIDE 0.4 MG/1
0.4 CAPSULE ORAL
Qty: 30 CAPSULE | Refills: 0 | Status: SHIPPED | OUTPATIENT
Start: 2017-12-09 | End: 2019-06-18 | Stop reason: ALTCHOICE

## 2017-12-08 RX ORDER — ONDANSETRON 2 MG/ML
4 INJECTION INTRAMUSCULAR; INTRAVENOUS ONCE AS NEEDED
Status: DISCONTINUED | OUTPATIENT
Start: 2017-12-08 | End: 2017-12-08 | Stop reason: HOSPADM

## 2017-12-08 RX ORDER — FENTANYL CITRATE/PF 50 MCG/ML
25 SYRINGE (ML) INJECTION
Status: DISCONTINUED | OUTPATIENT
Start: 2017-12-08 | End: 2017-12-08 | Stop reason: HOSPADM

## 2017-12-08 RX ORDER — HYDROCODONE BITARTRATE AND ACETAMINOPHEN 5; 325 MG/1; MG/1
2 TABLET ORAL EVERY 4 HOURS PRN
Status: DISCONTINUED | OUTPATIENT
Start: 2017-12-08 | End: 2017-12-08 | Stop reason: HOSPADM

## 2017-12-08 RX ORDER — CIPROFLOXACIN 500 MG/1
500 TABLET, FILM COATED ORAL 2 TIMES DAILY
Qty: 6 TABLET | Refills: 0 | Status: SHIPPED | OUTPATIENT
Start: 2017-12-08 | End: 2017-12-11

## 2017-12-08 RX ADMIN — LIDOCAINE HYDROCHLORIDE 50 MG: 10 INJECTION, SOLUTION INFILTRATION; PERINEURAL at 13:40

## 2017-12-08 RX ADMIN — HYDROMORPHONE HYDROCHLORIDE 1 MG: 1 INJECTION, SOLUTION INTRAMUSCULAR; INTRAVENOUS; SUBCUTANEOUS at 04:18

## 2017-12-08 RX ADMIN — FENTANYL CITRATE 50 MCG: 50 INJECTION, SOLUTION INTRAMUSCULAR; INTRAVENOUS at 13:45

## 2017-12-08 RX ADMIN — HYDROMORPHONE HYDROCHLORIDE 1 MG: 1 INJECTION, SOLUTION INTRAMUSCULAR; INTRAVENOUS; SUBCUTANEOUS at 07:27

## 2017-12-08 RX ADMIN — ONDANSETRON HYDROCHLORIDE 4 MG: 2 INJECTION, SOLUTION INTRAVENOUS at 13:55

## 2017-12-08 RX ADMIN — SODIUM CHLORIDE 100 ML/HR: 0.9 INJECTION, SOLUTION INTRAVENOUS at 05:51

## 2017-12-08 RX ADMIN — SODIUM CHLORIDE: 0.9 INJECTION, SOLUTION INTRAVENOUS at 13:33

## 2017-12-08 RX ADMIN — BUTALBITAL, ACETAMINOPHEN, AND CAFFEINE 1 TABLET: 50; 325; 40 TABLET ORAL at 00:36

## 2017-12-08 RX ADMIN — FENTANYL CITRATE 50 MCG: 50 INJECTION, SOLUTION INTRAMUSCULAR; INTRAVENOUS at 14:00

## 2017-12-08 RX ADMIN — HYDROMORPHONE HYDROCHLORIDE 1 MG: 1 INJECTION, SOLUTION INTRAMUSCULAR; INTRAVENOUS; SUBCUTANEOUS at 10:47

## 2017-12-08 RX ADMIN — HYDROMORPHONE HYDROCHLORIDE 1 MG: 1 INJECTION, SOLUTION INTRAMUSCULAR; INTRAVENOUS; SUBCUTANEOUS at 09:34

## 2017-12-08 RX ADMIN — CEFAZOLIN SODIUM 2000 MG: 2 SOLUTION INTRAVENOUS at 12:29

## 2017-12-08 RX ADMIN — TAMSULOSIN HYDROCHLORIDE 0.4 MG: 0.4 CAPSULE ORAL at 16:44

## 2017-12-08 RX ADMIN — MIDAZOLAM HYDROCHLORIDE 2 MG: 1 INJECTION, SOLUTION INTRAMUSCULAR; INTRAVENOUS at 13:35

## 2017-12-08 RX ADMIN — PROPOFOL 200 MG: 10 INJECTION, EMULSION INTRAVENOUS at 13:40

## 2017-12-08 NOTE — PROGRESS NOTES
Prepared for OR; ID band intact; Checklist completed; To OR via liter accompanied by OR assistant in satisfactory condition; Offers no complaints at this time

## 2017-12-08 NOTE — CONSULTS
Consultation - Urology   Lorrin Buerger 56 y o  male MRN: [id number]  Unit/Bed#: 406-01 Encounter: [id number]      Assessment/Plan      Assessment:  3 mm obstructing right distal ureteral calculus is identified causing right hydroureteronephrosis with probable forniceal rupture  Patient continues with right lower quadrant colicky abdominal pain, now an 8-9 on a 10 point pain scale    Other medical problems include:  Acute kidney injury  Creatinine at present is 1 6 this morning  Probably secondary to above  Elevated troponin levels, initially 0 05 and then 2 subsequent readings of 0 06  Patient denies any chest pain  EKG showed nonspecific findings with posterior fascicular block and normal sinus rhythm  Liver lesion noted on CT scan, probable incidental finding  Migraine headache    Plan:  Discussed via telephone with Dr Bradley Christie  Patient be placed on nothing by mouth status  Continue IV fluids for hydration  KUB stat  Patient will be taken the operating room early this afternoon for cystoscopy with right ureteral stent and possible stone extraction  Surgical consent will need to be obtained by Dr Bradley Christie, copy of operative permit was left in the room for the urologist preop  History of Present Illness   Attending: Freda Eisenmenger, MD  Reason for Consult / Principal Problem:  Right lower quadrant abdominal and flank pain  HPI: Lorrin Buerger is a 56y o  year old male who presents with a 3-4 day history of worsening right abdominal pain which radiates into his right flank    He describes the pain as intermittent and crampy  The patient was seen at the ED at [facility] on Monday and sent home  He had a CT scan performed there and was found to have a 3 mm nonobstructing stone  He was placed on cefaclor and Toradol  He denies any fever or chills  He has had some intermittent nausea though no vomiting  His urine has been darker in color and voiding small amounts    He denies any gross blood in his urine  The patient presented here to the ED yesterday and he again underwent a CT scan with report listed below  He is found have a 3 mm obstructing right distal ureteral calculus identified with right hydro ureteral nephrosis  There was probable forniceal rupture noted  The patient continues with abdominal pain now describes as a 8 or 9 on a 10 point pain scale  The patient was started on 1 dose of Flomax last evening  He also received 1 dose of cefazolin 2000 mg IV  Patient has been start subcu heparin for DVT prophylaxis  Inpatient consult to Urology  Consult performed by: Mario Hsieh ordered by: Carlos Polanco        Review of Systems     Gastrointestinal: Positive for nausea  Musculoskeletal: Positive for back pain  Patient denies any chest pain, shortness of breath or cough  His headache is improved since last evening  Urology review as described above in the HPI  Historical Information   History reviewed  No pertinent past medical history    Past Surgical History:   Procedure Laterality Date    APPENDECTOMY      TONSILLECTOMY      WISDOM TOOTH EXTRACTION       Social History   History   Alcohol Use No     History   Drug Use No     History   Smoking Status    Never Smoker   Smokeless Tobacco    Never Used     Family History: Noncontributory    Meds/Allergies   current meds:   Current Facility-Administered Medications   Medication Dose Route Frequency    acetaminophen (TYLENOL) tablet 650 mg  650 mg Oral Q6H PRN    ceFAZolin (ANCEF) IVPB (premix) 2,000 mg  2,000 mg Intravenous Once    heparin (porcine) subcutaneous injection 5,000 Units  5,000 Units Subcutaneous Q8H [address]    HYDROmorphone (DILAUDID) 1 mg/mL injection 1 mg  1 mg Intravenous Q3H PRN    ondansetron (ZOFRAN) injection 4 mg  4 mg Intravenous Q4H PRN    ondansetron (ZOFRAN) injection 4 mg  4 mg Intravenous Q4H PRN    sodium chloride 0 9 % infusion  100 mL/hr Intravenous Continuous    tamsulosin (FLOMAX) capsule 0 4 mg  0 4 mg Oral Daily With Dinner     No Known Allergies    Objective   Vitals: Blood pressure (!) 189/93, pulse 79, temperature 98 4 °F (36 9 °C), temperature source Temporal, resp  rate 16, height 6' 2" (1 88 m), weight 101 kg (223 lb 1 7 oz), SpO2 92 %  I/O last 24 hours: In: -   Out: 1600 [Urine:1600]    Invasive Devices     Peripheral Intravenous Line            Peripheral IV 12/07/17 Left;Ventral (anterior) Antecubital less than 1 day                Physical Exam   The patient has received pain medication  He is resting at present  The patient is arousable and responds to questions appropriately  He appears uncomfortable  Skin warm and dry to touch  HEENT entirely unremarkable  Neck supple no adenopathy or goiter  Chest symmetric  Heart regular rate and rhythm  Lungs essentially clear  Back reveals positive Handy sign with right CVA tenderness to percussion  Abdominal exam reveals flat appearance  Positive bowel sounds are heard  Patient previous appendectomy  The abdomen is soft  There is tenderness to palpation in the right lower quadrant with some voluntary guarding of the area  Normal genitalia  Moves all 4 extremities well  No calf tenderness or peripheral edema      Lab Results:   CBC:   Lab Results   Component Value Date    WBC 7 97 12/08/2017    HGB 14 1 12/08/2017    HCT 40 1 12/08/2017    MCV 90 12/08/2017     12/08/2017    MCH 31 5 12/08/2017    MCHC 35 2 12/08/2017    RDW 12 7 12/08/2017    MPV 10 8 12/08/2017     CMP:   Lab Results   Component Value Date     12/08/2017     12/08/2017    CO2 28 12/08/2017    ANIONGAP 7 12/08/2017    BUN 19 12/08/2017    CREATININE 1 60 (H) 12/08/2017    GLUCOSE 97 12/08/2017    CALCIUM 8 3 12/08/2017    AST 26 12/08/2017    ALT 60 12/08/2017    ALKPHOS 82 12/08/2017    PROT 6 4 12/08/2017    ALBUMIN 3 2 (L) 12/08/2017    BILITOT 1 10 (H) 12/08/2017    EGFR 51 12/08/2017     Urinalysis:   Lab Results Component Value Date    COLORU Yellow 12/07/2017    CLARITYU Clear 12/07/2017    SPECGRAV 1 010 12/07/2017    PHUR 6 0 12/07/2017    LEUKOCYTESUR Negative 12/07/2017    NITRITE Negative 12/07/2017    PROTEINUA Negative 12/07/2017    GLUCOSEU Negative 12/07/2017    KETONESU Negative 12/07/2017    BILIRUBINUR Negative 12/07/2017    BLOODU Trace-Intact 12/07/2017     Urine Culture: No results found for: URINECX  Imaging Studies: I have personally reviewed pertinent films in PACS     CT ABDOMEN AND PELVIS WITHOUT IV CONTRAST     INDICATION:  Right lower quadrant pain     COMPARISON: None      TECHNIQUE:  CT examination of the abdomen and pelvis was performed without intravenous contrast   Reformatted images were created in axial, sagittal, and coronal planes        Radiation dose length product (DLP) for this visit:  718 mGy-cm   This examination, like all CT scans performed in the [facility], was performed utilizing techniques to minimize radiation dose exposure, including the use of iterative   reconstruction and automated exposure control       Enteric contrast was administered       FINDINGS:     ABDOMEN     LOWER CHEST:  No significant abnormalities identified in the lower chest      LIVER/BILIARY TREE:  Incomplete characterized 3 4 cm right hepatic lobe hypodense lesion, correlate with right upper quadrant ultrasound      GALLBLADDER:  No calcified gallstones  No pericholecystic inflammatory change      SPLEEN:  Unremarkable      PANCREAS:  Unremarkable      ADRENAL GLANDS:  Unremarkable      KIDNEYS/URETERS:  3 mm obstructing right distal ureteral calculus is identified causing right hydroureteronephrosis  There is significant stranding about the right kidney within the perinephric space extending inferiorly along the retroperitoneum   anterior to the right psoas and iliopsoas muscle   These findings could relate to edema from a ruptured calyx or relate to infectious/inflammatory process such as pyelonephritis/ureteritis  Correlate clinically      STOMACH AND BOWEL:  Unremarkable      APPENDIX:  No findings to suggest appendicitis      ABDOMINOPELVIC CAVITY:  No free air or lymphadenopathy  Trace amount of free fluid noted in the pelvis is nonspecific but abnormal for a male patient      VESSELS:  Unremarkable for patient's age      PELVIS     REPRODUCTIVE ORGANS:  Unremarkable for patient's age      URINARY BLADDER:  Nondistended and inadequately evaluated      ABDOMINAL WALL/INGUINAL REGIONS:  Unremarkable      OSSEOUS STRUCTURES:  No acute fracture or destructive osseous lesion      IMPRESSION:     3 mm obstructing right distal ureteral calculus is identified causing right hydroureteronephrosis  There is significant stranding /fluid about the right kidney within the perinephric space extending inferiorly along the right   retroperitoneum/extraperitoneal to the right psoas and iliopsoas muscle  These findings could relate to edema from a ruptured calyx or relate to infectious/inflammatory process such as pyelonephritis/ureteritis  Correlate clinically      No free air or lymphadenopathy  Trace amount of free fluid noted in the pelvis is nonspecific but abnormal for a male patient      Incomplete characterized 3 4 cm right hepatic lobe hypodense lesion, correlate with right upper quadrant ultrasound        EKG, Pathology, and Other Studies: I have personally reviewed pertinent reports  VTE Prophylaxis: Heparin    Code Status: Level 1 - Full Code  Counseling / Coordination of Care  Total floor / unit time spent today 30 minutes  Greater than 50% of total time was spent with the patient and / or family counseling and / or coordination of care  A description of the counseling / coordination of care:  Review of the patient's diagnostic imaging and laboratory studies performed   Coordination of preoperative surgical planning including telephone correspondence twice with Dr Julian Maguire about the patient and timing for surgical intervention in coordination with the operating room for early this afternoon      Hallie Fregoso PA-C

## 2017-12-08 NOTE — NURSING NOTE
Patient left unit in wheelchair assisted by PCA  Discharge instructions given to patient and spouse  Prescriptions given to patient and spouse  Patients vitals stable and in no acute distress

## 2017-12-08 NOTE — ANESTHESIA PREPROCEDURE EVALUATION
Review of Systems/Medical History  Patient summary reviewed  Chart reviewed  No history of anesthetic complications     Cardiovascular  Hypertension ,    Pulmonary       GI/Hepatic            Endo/Other     GYN       Hematology   Musculoskeletal       Neurology   Psychology           Physical Exam    Airway    Mallampati score: II  TM Distance: >3 FB  Neck ROM: full     Dental       Cardiovascular  Cardiovascular exam normal    Pulmonary  Pulmonary exam normal     Other Findings        Anesthesia Plan  ASA Score- 2 Emergent      Anesthesia Type- general with ASA Monitors  Additional Monitors:   Airway Plan: ETT  Comment: Plan discussed  Consent obtained          Induction- intravenous  Informed Consent- Anesthetic plan and risks discussed with patient

## 2017-12-08 NOTE — SOCIAL WORK
Cm met with the patient to evaluate the patients prior function and living situation and any barriers to d/c and form a safe d/c plan  Cm also evaluated the patient for any services in the home or needs for services  Cm will follow the patient and address any needs and address all concerns  The patient is independent at home with all adls and he has no dme at home and he has no lw poa the patient drives and he is able to make his appointments and hia  pcp is Dr Kait Kathleen  No needs on d/c the cm discharge check list is complete reviewed with the patient and family

## 2017-12-08 NOTE — PROGRESS NOTES
Patient received from PACU at 1530  Patient denies pain, alert and oriented  Patients family visiting with him  Will continue to monitor

## 2017-12-08 NOTE — PROGRESS NOTES
[facility] Progress Note  Patient: Bee Dorsey 56 y o  male   MRN: [id number]  PCP: Niecy Murphy DO  Unit/Bed#: 331-00 Encounter: [id number]  Date Of Visit: 17    Assessment:    Principal Problem:    Ureteral stone with hydronephrosis  Active Problems:    YVONNE (acute kidney injury) ([address] )    Hepatic lesion    Elevated troponin    Other secondary hypertension      Plan:    · Ureteral stone with hydronephrosis, patient with severe pain despite IV narcotics  · Acute kidney injury, patient's creatinine is significantly elevated above baseline consistent with acute kidney injury  Slight improvement overnight with supportive care IV fluid, avoid nephrotoxins  · Hypertension, continue to monitor blood pressures, no acute intervention, blood pressure elevation likely due to severe pain  · Hepatic lesion, follow-up ultrasound  · Elevated troponin, demand ischemia  Patient is medically stable to proceed to the operating room for planned urologic intervention, patient is low risk for planned procedure  Time Spent for Care: 1 hour  More than 50% of total time spent on counseling and coordination of care as described above  Current Length of Stay: 1 day(s)    Current Patient Status: Inpatient     Code Status: Level 1 - Full Code      Subjective:   No pain  Objective:     Vitals:   Temp (24hrs), Av 1 °F (36 7 °C), Min:97 2 °F (36 2 °C), Max:98 4 °F (36 9 °C)    HR:  [59-79] 79  Resp:  [14-27] 16  BP: (126-189)/(60-98) 189/93  SpO2:  [92 %-100 %] 92 %  Body mass index is 28 65 kg/m²  Input and Output Summary (last 24 hours): Intake/Output Summary (Last 24 hours) at 17 0957  Last data filed at 17 0603   Gross per 24 hour   Intake                0 ml   Output             1300 ml   Net            -1300 ml       Physical Exam:     Physical Exam   Constitutional: He is oriented to person, place, and time  He appears well-developed and well-nourished  No distress  Pulmonary/Chest: Effort normal and breath sounds normal  No respiratory distress  He has no wheezes  Abdominal: Soft  He exhibits no distension  There is tenderness  Hypoactive     Neurological: He is alert and oriented to person, place, and time  No cranial nerve deficit  Coordination normal    Skin: He is not diaphoretic  Nursing note and vitals reviewed  Additional Data:     Labs:      Results from last 7 days  Lab Units 12/08/17  0547  12/07/17  1518   WBC Thousand/uL 7 97  --  10 14   HEMOGLOBIN g/dL 14 1  --  15 6   HEMATOCRIT % 40 1  --  44 2   PLATELETS Thousands/uL 167  < > 190   NEUTROS PCT %  --   --  73   LYMPHS PCT %  --   --  15   MONOS PCT %  --   --  11   EOS PCT %  --   --  1   < > = values in this interval not displayed  Results from last 7 days  Lab Units 12/08/17  0547   SODIUM mmol/L 138   POTASSIUM mmol/L 4 6   CHLORIDE mmol/L 103   CO2 mmol/L 28   BUN mg/dL 19   CREATININE mg/dL 1 60*   CALCIUM mg/dL 8 3   TOTAL PROTEIN g/dL 6 4   BILIRUBIN TOTAL mg/dL 1 10*   ALK PHOS U/L 82   ALT U/L 60   AST U/L 26   GLUCOSE RANDOM mg/dL 97       Results from last 7 days  Lab Units 12/08/17  0547   INR  1 05       * I Have Reviewed All Lab Data Listed Above  * Additional Pertinent Lab Tests Reviewed: All Labs For Current Hospital Admission Reviewed    Imaging:    Imaging Reports Reviewed Today Include:  CT report was reviewed      Recent Cultures (last 7 days):           Last 24 Hours Medication List:     heparin (porcine) 5,000 Units Subcutaneous Q8H [address]   tamsulosin 0 4 mg Oral Daily With Dinner        Today, Patient Was Seen By: Marylen Glad, DO

## 2017-12-08 NOTE — OP NOTE
OPERATIVE REPORT  PATIENT NAME: Ezra Murray    :  1970  MRN: [id number]  Pt Location: [facility] OR ROOM 02    SURGERY DATE: 2017    Surgeon(s) and Role:     * Theodore Rivera MD - Primary    Preop Diagnosis:  YVONNE (acute kidney injury) ([facility] [address] ) [N17 9]  Intractable pain [R52]  Ureteral stone with hydronephrosis [N13 2]    Post-Op Diagnosis Codes:     * YVONNE (acute kidney injury) ([facility] [address] ) [N17 9]     * Intractable pain [R52]     * Ureteral stone with hydronephrosis [N13 2]    Procedure:  Cystoscopy, right ureteroscopy, right retrograde pyelogram, insertion of right double-J ureteral stent    Specimen(s):  * No specimens in log *    Estimated Blood Loss:   Minimal    Drains:  Right 26-6 Urdu double-J ureteral stent       Anesthesia Type:   General    Operative Indications:  YVONNE (acute kidney injury) ([facility] [address] ) [N17 9]  Intractable pain [R52]  Ureteral stone with hydronephrosis [N13 2]      Operative Findings:  Filling defect in the right distal ureter consistent with the stone, very narrow right distal ureter unable to be navigated with the ureteral scope  Right ureteral stenting alone performed today  Patient will require interval ureteroscopy in the future  Complications:   None      Procedure and Technique:  Bradley Jones is a 41-year-old male admitted with 4-5 days of right-sided flank pain  CT scan revealed a 3 mm distal right ureteral calculus with mild right-sided hydronephrosis  The patient appeared to have a forniceal rupture with fluid in the retroperitoneum on CT scan  Risk and benefits of conservative management with observation versus ureteroscopic intervention with stent insertion were discussed and reviewed  Based on the patient's level of intractable right-sided flank pain, he wished to proceed with ureteroscopic intervention  Informed consent was obtained and the patient was brought to the operating room on 2017      After the smooth administration of general LMA anesthesia, the patient was placed in the dorsal lithotomy position  His genitalia was prepped and draped in sterile fashion  Intravenous antibiotics were administered  A time-out was performed with all members of the operative team confirming the patient's identity, procedure to be performed, laterality of the case  A 22 [place] rigid cystoscope with 30 degree lens was inserted  The bladder was thoroughly inspected  There was no evidence of calculi identified  Attention was focused on the right ureteral orifice  A 5 [place] open-ended catheter was inserted and a gentle right retrograde pyelogram was performed  A filling defect in the distal right ureter consistent with the stone was identified  A wire was passed proximal to the stone and secured as a safety  A short semi-rigid ureteral scope was then passed adjacent to the wire  A 2nd wire was placed to assist in intubation of the right ureter  As the scope was passed into the distal right ureter it was clear that the caliber of the right ureter was extremely narrow  The ureter was so narrow that I could not safely pass the semi-rigid ureteral scope more proximally  I therefore removed the ureteral scope a made a decision to place a right double-J ureteral stent  The 5 [place] open-ended catheter was reinserted an additional contrast was injected filling the right collecting system as a roadmap for stent insertion  The wire was then backloaded through the cystoscope and a right 26-6 [place] Araceli double-J ureteral stent was then placed in the standard fashion  The proximal coil was appreciated within the right renal pelvis and the distal coil was visualized within the bladder  There was no string left in place  The bladder was emptied and the cystoscope was removed  Overall the patient tolerated the procedure well number no complications  Patient was extubated in the operating room and transferred to the PACU in stable condition at the conclusion of the case    The patient will require interval ureteroscopy in the future      Patient Disposition:  PACU stable and extubated    SIGNATURE: Osmin Jerez MD  DATE: December 8, 2017  TIME: 2:23 PM

## 2017-12-08 NOTE — PLAN OF CARE
Problem: PAIN - ADULT  Goal: Verbalizes/displays adequate comfort level or baseline comfort level  Interventions:  - Encourage patient to monitor pain and request assistance  - Assess pain using appropriate pain scale  - Administer analgesics based on type and severity of pain and evaluate response  - Implement non-pharmacological measures as appropriate and evaluate response  - Consider cultural and social influences on pain and pain management  - Notify physician/advanced practitioner if interventions unsuccessful or patient reports new pain  Outcome: Not Progressing  Medicated with tylenol for headache minimal relief noted    Dilaudid controlling back and r groin pain    Problem: INFECTION - ADULT  Goal: Absence or prevention of progression during hospitalization  INTERVENTIONS:  - Assess and monitor for signs and symptoms of infection  - Monitor lab/diagnostic results  - Monitor all insertion sites, i e  indwelling lines, tubes, and drains  - Monitor endotracheal (as able) and nasal secretions for changes in amount and color  - [place] appropriate cooling/warming therapies per order  - Administer medications as ordered  - Instruct and encourage patient and family to use good hand hygiene technique  - Identify and instruct in appropriate isolation precautions for identified infection/condition  Outcome: Progressing    Goal: Absence of fever/infection during neutropenic period  INTERVENTIONS:  - Monitor WBC  - Implement neutropenic guidelines  Outcome: Progressing

## 2017-12-08 NOTE — DISCHARGE SUMMARY
Discharge Summary - [address] [facility]    Patient Information: Simone Deras 56 y o  male MRN: [id number]  Unit/Bed#: 406-01 Encounter: [id number]    Discharging Physician / Practitioner: Marylen Glad, DO  PCP: Dahlia Acuna DO  Admission Date: 12/7/2017  Discharge Date: 12/08/17    Reason for Admission:  Obstructing right ureteral stone with severe pain, not controlled by IV pain medicine nor oral narcotics    Discharge Diagnoses:     Principal Problem (Resolved):    Hydronephrosis  Active Problems:    YVONNE (acute kidney injury) ([address] )    Ureteral stone with hydronephrosis    Hepatic lesion    Other secondary hypertension  Resolved Problems:    Elevated troponin    Intractable pain      Consultations During Hospital Stay:  · Urology    Procedures Performed:     · Ureteroscopy with stent placement    Significant Findings / Test Results:     · Ureteral obstruction    Incidental Findings:   · Liver hemangioma  · Renal cyst     Test Results Pending at Discharge (will require follow up): · None     Outpatient Tests Requested:  · Renal ultrasound in 6 months  · Cardiac stress test in 1 to 2 months    Complications:  None    Hospital Course:     Simone Deras is a 56 y o  male patient who originally presented to the hospital on 12/7/2017 due to right-sided flank pain with radiation to right groin, severe pain not relieved by oral Toradol or other pain medications  Patient had significant elevation of his creatinine, consistent with acute kidney injury  Patient was taken to the operating room on 12/8/2017, underwent cystoscopy/ureteroscopy with right ureteral stent placement  Patient be discharged on [place] and 3 day course of p o  Cipro  Patient was incidentally found to have a liver lesion which is consistent with a benign hemangioma, patient was also found to have a small renal cyst which requires renal ultrasound in 6 months          Condition at Discharge: good     Discharge Day Visit / Exam: Subjective:  Patient overall feeling much better, still with some hematuria, slight discomfort with urination  Vitals: Blood Pressure: 161/79 (12/08/17 1628)  Pulse: 78 (12/08/17 1628)  Temperature: 98 °F (36 7 °C) (12/08/17 1628)  Temp Source: Temporal (12/08/17 1628)  Respirations: 18 (12/08/17 1628)  Height: 6' 2" (188 cm) (12/07/17 1910)  Weight - Scale: 101 kg (223 lb 1 7 oz) (12/07/17 1910)  SpO2: 97 % (12/08/17 1628)  Exam:   Physical Exam  Please see progress note dated same day  Plan of care discussed at length with patient and his wife at time of discharge  Discharge instructions/Information to patient and family:   See after visit summary for information provided to patient and family  Provisions for Follow-Up Care:  See after visit summary for information related to follow-up care and any pertinent home health orders  Disposition:     Home       Discharge Statement:  I spent 35 minutes discharging the patient  This time was spent on the day of discharge  I had direct contact with the patient on the day of discharge  Greater than 50% of the total time was spent examining patient, answering all patient questions, arranging and discussing plan of care with patient as well as directly providing post-discharge instructions  Additional time then spent on discharge activities  Discharge Medications:  See after visit summary for reconciled discharge medications provided to patient and family        ** Please Note: This note has been constructed using a voice recognition system **

## 2017-12-08 NOTE — ANESTHESIA POSTPROCEDURE EVALUATION
Post-Op Assessment Note      CV Status:  Stable    Mental Status:  Somnolent    Hydration Status:  Stable    PONV Controlled:  None    Airway Patency:  Patent    Post Op Vitals Reviewed: Yes          Staff: CRNA           BP  175/91   Temp   100 7   Pulse  111   Resp  16    SpO2   99

## 2017-12-08 NOTE — CASE MANAGEMENT
Initial Clinical Review    Admission: Date/Time/Statement: 12/7/17 @ 1707     Orders Placed This Encounter   Procedures    Inpatient Admission (expected length of stay for this patient is greater than two midnights)     Standing Status:   Standing     Number of Occurrences:   1     Order Specific Question:   Admitting Physician     Answer:   Hope Garcia     Order Specific Question:   Level of Care     Answer:   Med Surg [16]     Order Specific Question:   Estimated length of stay     Answer:   More than 2 Midnights     Order Specific Question:   Certification     Answer:   I certify that inpatient services are medically necessary for this patient for a duration of greater than two midnights  See H&P and MD Progress Notes for additional information about the patient's course of treatment   Inpatient Admission (expected length of stay for this patient is greater than two midnights)     Standing Status:   Standing     Number of Occurrences:   1     Order Specific Question:   Admitting Physician     Answer:   Hope Garcia     Order Specific Question:   Level of Care     Answer:   Med Surg [16]     Order Specific Question:   Estimated length of stay     Answer:   More than 2 Midnights     Order Specific Question:   Certification     Answer:   I certify that inpatient services are medically necessary for this patient for a duration of greater than two midnights  See H&P and MD Progress Notes for additional information about the patient's course of treatment           ED: Date/Time/Mode of Arrival:   ED Arrival Information     Expected Arrival Acuity Means of Arrival Escorted By Service Admission Type    - 12/7/2017 14:40 Urgent Walk-In Spouse General Medicine Urgent    Arrival Complaint    abdominal pain          Chief Complaint:   Chief Complaint   Patient presents with    Dizziness     Dizzy and weak since last night, blurred vision    Abdominal Pain     RLQ pain, was seen in ED and dx with kidney stone, increased pain now       History of Illness: The patient is a pleasant 56year old male with no significant pmh who presents today with a chief complaint of right sided flank pain  He was seen in [place] on Monday and was noted to have a 3mm non obstructing stone, he was sent home on cefaclor and ketoralc  He's denying any fevers or chills, had some nausea but no vomiting  Complains of not being able to find comfortable position, pain respond well to dilaudid and he's currently resting comfortably  Case was discussed with ER physician and reviewed with Urology  ED Vital Signs:   ED Triage Vitals [12/07/17 1449]   Temperature Pulse Respirations Blood Pressure SpO2   98 4 °F (36 9 °C) 67 20 (!) 171/98 100 %      Temp Source Heart Rate Source Patient Position - Orthostatic VS BP Location FiO2 (%)   Temporal Monitor Lying Left arm --      Pain Score       9        Wt Readings from Last 1 Encounters:   12/07/17 101 kg (223 lb 1 7 oz)       Vital Signs (abnormal):       12/07/17 1700  --  59   23  100 %  --  --   12/07/17 1530  --  73   27  96 %  --  --   12/07/17 1515  --  71   26  96 %  --         Abnormal Labs/Diagnostic Test Results:     CREATININE mg/dL 1 60*     BILIRUBIN TOTAL mg/dL 1 10*       CT abdomen pelvis wo contrast   Final Result       3 mm obstructing right distal ureteral calculus is identified causing right hydroureteronephrosis  There is significant stranding /fluid about the right kidney within the perinephric space extending inferiorly along the right    retroperitoneum/extraperitoneal to the right psoas and iliopsoas muscle  These findings could relate to edema from a ruptured calyx or relate to infectious/inflammatory process such as pyelonephritis/ureteritis  Correlate clinically        No free air or lymphadenopathy   Trace amount of free fluid noted in the pelvis is nonspecific but abnormal for a male patient        Incomplete characterized 3 4 cm right hepatic lobe hypodense lesion, correlate with right upper quadrant ultrasound  US scrotum and testicles   Final Result       1  No acute scrotal findings  Specifically, no evidence for torsion        2  Isolated right-sided varicocele  Classical teaching says that unilateral right varicocele should raise concern for intra-abdominal mass compressing the IVC (though this is rarely the case, in practice)  Recommend obtaining the results from the patient's recent outside CT scan  ED Treatment:   Medication Administration from 12/07/2017 1440 to 12/07/2017 1905       Date/Time Order Dose Route Action     12/07/2017 1522 sodium chloride 0 9 % bolus 1,000 mL 1,000 mL Intravenous New Bag     12/07/2017 1522 ondansetron (ZOFRAN) injection 4 mg 4 mg Intravenous Given     12/07/2017 1522 HYDROmorphone (DILAUDID) 1 mg/mL injection 1 mg 1 mg Intravenous Given     12/07/2017 1710 HYDROmorphone (DILAUDID) 1 mg/mL injection 1 mg 1 mg Intravenous Given     12/07/2017 1748 tamsulosin (FLOMAX) capsule 0 4 mg 0 4 mg Oral Given       Past Medical/Surgical History:       No Additional Past Medical History       Admitting Diagnosis: Hydronephrosis [N13 30]  Abdominal pain [R10 9]  Elevated troponin [R74 8]  Acute renal injury ([address] ) [N17 9]  Intractable pain [R52]    Age/Sex: 56 y o  male    Assessment/Plan:     ED  UROLOGY:    56year old male with a known right sided stone diagnosed at an outside hospital   Presents today with abdominal pain  CT scan demonstrates 3 mm right distal stone with resultant hydroureteronephrosis and likely forniceal rupture  The patient has having some pain which is controlled with medications  He is afebrile  His white count is 10 but his creatinine is slightly elevated at 2  He will be admitted for IV fluid hydration  His last and full solid meal was at 3:00 p m  and there are no urgent or emergent signs at this time to prompt emergent decompression    The patient will be kept NPO after midnight and decision about stenting will be reviewed tomorrow  If the patient becomes febrile or demonstrate any signs of sepsis, Urology should be contacted immediately  His urine does not show signs of infection and the patient will not be started on antibiotics at this time    INTERNAL MEDICINE  Right Hydroureteronephrosis and probable calyx rupture  Case discussed with urology  Pain control, IVF, flomax  NPO after midnight for possible stenting tomorrow   If he develops any febrile episodes or signs of sepsis Urology will be contacted immediately, empiric antibiotics and blood cultures to be drawn     Will also start Flomax   Per urology no seo indicated for now      YVONNE   Likely 2/2 #1  IVF, obtain urine sodium and creatinine      Elevated troponin  Likely demand ischemia will trend x 2 and obtain echocardiogram in am  EKG reviewed non ischemic, but shows posterior fasicular block and NSR      Liver lesion  RUQ Ultrasound     Migraine headache  Will trial 2 gram Magnesium, IVF,  Tylenol      LOS > 2 midnights    Admission Orders:    URINE  RANDOM SODIUM, RANDOM CREATININE    URINE CULTURE   XR abdomen 1 view kub   ECHO    Case request operating room: URETEROSCOPY   Date/Time: 12/08/17 1400   Procedure: CYSTOSCOPY URETEROSCOPY WITH LITHOTRIPSY HOLMIUM LASER, RETROGRADE PYELOGRAM AND INSERTION STENT URETERAL (Right Bladder)   Anesthesia type: General   Diagnosis:       YVONNE (acute kidney injury) ([facility] ) [N17 9]      Intractable pain [R52]      Ureteral stone with hydronephrosis [N13 2]         Scheduled Meds:   cefazolin 2,000 mg Intravenous Once   heparin (porcine) 5,000 Units Subcutaneous Q8H [facility]   tamsulosin 0 4 mg Oral Daily With Dinner     Continuous Infusions:   sodium chloride 100 mL/hr Last Rate: 100 mL/hr (12/08/17 0551)     PRN Meds:   acetaminophen    HYDROmorphone    ondansetron    ondansetron

## 2017-12-08 NOTE — PROGRESS NOTES
Cinthya Vicente underwent cystoscopy and right ureteroscopy in the operating room today  I was unable to remove his stone as his distal right ureter was quite narrow and I could not safely pass the ureteral scope more proximally into the ureter  I therefore left a right ureteral stent alone  The patient will require follow-up in the outpatient setting with interval ureteroscopy in the near future  As long as the patient's vital signs are stable and his pain is controlled, the patient can be discharged from urologic standpoint  Prescriptions for Norco and Cipro have been placed on the chart  Call for follow-up with Dr Elyssa Pandey at [phone]

## 2017-12-09 NOTE — SOCIAL WORK
Late Note 12/9/17 : Pt left last evening after Case Management left  Pt will set up his own follow up appts

## 2017-12-10 LAB — BACTERIA UR CULT: NORMAL

## 2017-12-11 ENCOUNTER — GENERIC CONVERSION - ENCOUNTER (OUTPATIENT)
Dept: OTHER | Facility: OTHER | Age: 47
End: 2017-12-11

## 2017-12-12 ENCOUNTER — TRANSCRIBE ORDERS (OUTPATIENT)
Dept: ADMINISTRATIVE | Facility: HOSPITAL | Age: 47
End: 2017-12-12

## 2017-12-12 ENCOUNTER — ALLSCRIPTS OFFICE VISIT (OUTPATIENT)
Dept: OTHER | Facility: OTHER | Age: 47
End: 2017-12-12

## 2017-12-12 ENCOUNTER — GENERIC CONVERSION - ENCOUNTER (OUTPATIENT)
Dept: OTHER | Facility: OTHER | Age: 47
End: 2017-12-12

## 2017-12-12 ENCOUNTER — APPOINTMENT (OUTPATIENT)
Dept: LAB | Facility: HOSPITAL | Age: 47
End: 2017-12-12
Payer: COMMERCIAL

## 2017-12-12 DIAGNOSIS — N20.0 CALCULUS OF KIDNEY: ICD-10-CM

## 2017-12-12 DIAGNOSIS — N40.1 ENLARGED PROSTATE WITH LOWER URINARY TRACT SYMPTOMS (LUTS): ICD-10-CM

## 2017-12-12 PROCEDURE — 87086 URINE CULTURE/COLONY COUNT: CPT

## 2017-12-13 LAB — BACTERIA UR CULT: NORMAL

## 2017-12-13 NOTE — PROGRESS NOTES
Assessment    1  Enlarged prostate without lower urinary tract symptoms (luts) (600 00) (N40 0)   2  Erectile dysfunction of non-organic origin (302 72) (F52 21)    Discussion/Summary  Discussion Summary:   72-year-old male managed by Dr Negrito Ignacio  3 mm obstructing stone status post right stent placement 12/8/2017 pending ureteroscopy  and physical is obtained for the patient's upcoming cystoscopy, right ureteroscopy, holmium laser lithotripsy, basket stone extraction, retrograde pyelogram, and right stent exchange  Urine will be sent out for culture  Risks were reviewed and all questions and concerns were addressed and answered  The patient understands agrees this treatment plan  Chief Complaint  Chief Complaint Free Text Note Form: pt here for H&P for 1/2/18 surgery      History of Present Illness  HPI: Miguelito Camarillo is a 56year old male here for H&P prior to his upcoming right ureteroscopy  He had a right stent placed 12/8/17 for a 3mm obstructing right ureteral calculi with hydroureteronephrosis and a forniceal rupture  Stent was not placed for infection but a narrow ureter not allowing for scope passage  Review of Systems  Complete-Male Urology:  Constitutional: No fever or chills, feels well, no tiredness, no recent weight gain or weight loss  Respiratory: No complaints of shortness of breath, no wheezing, no cough, no SOB on exertion, no orthopnea or PND  Cardiovascular: No complaints of slow heart rate, no fast heart rate, no chest pain, no palpitations, no leg claudication, no lower extremity  Gastrointestinal: No complaints of abdominal pain, no constipation, no nausea or vomiting, no diarrhea or bloody stools  Musculoskeletal: No complaints of arthralgia, no myalgias, no joint swelling or stiffness, no limb pain or swelling  Integumentary: No complaints of skin rash or skin lesions, no itching, no skin wound, no dry skin    Hematologic/Lymphatic: No complaints of swollen glands, no swollen glands in the neck, does not bleed easily, no easy bruising  Neurological: No compliants of headache, no confusion, no convulsions, no numbness or tingling, no dizziness or fainting, no limb weakness, no difficulty walking  ROS Reviewed:   ROS reviewed  Active Problems    1  Benign non-nodular prostatic hyperplasia with lower urinary tract symptoms (600 91) (N40 1)   2  BPV (benign positional vertigo) (386 11) (H81 10)   3  Coccydynia (724 79) (M53 3)   4  Contact dermatitis due to poison ivy (692 6) (L23 7)   5  Depression screening (V79 0) (Z13 89)   6  Dermatitis (692 9) (L30 9)   7  Enlarged prostate without lower urinary tract symptoms (luts) (600 00) (N40 0)   8  Erectile dysfunction of non-organic origin (302 72) (F52 21)   9  Fatigue (780 79) (R53 83)   10  High risk medication use (V58 69) (Z79 899)   11  Hyperlipidemia (272 4) (E78 5)   12  Kidney calculus (592 0) (N20 0)   13  Low testosterone (259 9) (E34 9)   14  Nausea (787 02) (R11 0)   15  Need for influenza vaccination (V04 81) (Z23)   16  Tarsal tunnel syndrome, unspecified laterality (355 5) (G57 50)   17  Vitamin D deficiency (268 9) (E55 9)    Past Medical History  1  History of Acute medial meniscus tear of left knee (836 0) (S83 242A)   2  History of Acute upper respiratory infection (465 9) (J06 9)   3  History of Allergy to insects (V15 06) (Z91 038)   4  History of Bee sting (989 5,E905 3) (T63 441A)   5  History of Contact dermatitis (692 9) (L25 9)   6  History of Fatigue (780 79) (R53 83)   7  History of Finger infection (686 9) (L08 9)   8  History of back pain (V13 59) (Z87 39)   9  History of herpes zoster (V12 09) (Z86 19)  Active Problems And Past Medical History Reviewed: The active problems and past medical history were reviewed and updated today  Surgical History  1  History of Appendectomy   2  History of Knee Surgery   3  History of Oral Surgery Tooth Extraction   4   History of Tonsillectomy With Adenoidectomy  Surgical History Reviewed: The surgical history was reviewed and updated today  Family History  Mother    1  Family history of Type 2 diabetes mellitus (250 00) (E11 9)  Father    2  Family history of Esophageal Cancer (150 9)   3  Family history of hypertension (V17 49) (Z82 49)   4  Family history of Prostate cancer (80) (C61)  Family History Reviewed: The family history was reviewed and updated today  Social History     · Denied: Drug use (305 90) (F19 90)   · Never a smoker   · Never Drank Alcohol  Social History Reviewed: The social history was reviewed and updated today  The social history was reviewed and is unchanged  Current Meds   1  Flomax 0 4 MG Oral Capsule; Therapy: [id number] to Recorded   2  Meclizine HCl - 25 MG Oral Tablet; 1/2 to 1 tablet every 6-8 hours as needed for dizziness; Therapy: [id number] to (Last Rx:13Nov2017)  Requested for: [id number] Ordered  Medication List Reviewed: The medication list was reviewed and updated today  Allergies  1  No Known Drug Allergies    Vitals  Vital Signs    Recorded: [id number] 09:40AM   Heart Rate 66   Systolic 402   Diastolic 80   Height 6 ft 2 in   Weight 221 lb    BMI Calculated 28 38   BSA Calculated 2 27       Physical Exam   Constitutional  General appearance: No acute distress, well appearing and well nourished  Pulmonary  Respiratory effort: No increased work of breathing or signs of respiratory distress  Auscultation of lungs: Clear to auscultation  Cardiovascular  Palpation of heart: Normal PMI, no thrills  Auscultation of heart: Normal rate and rhythm, normal S1 and S2, without murmurs  Examination of extremities for edema and/or varicosities: Normal    Abdomen  Abdomen: Non-tender, no masses     Musculoskeletal  Gait and station: Normal        Signatures   Electronically signed by : Jimmy Allison, ; Dec 12 2017 10:04AM EST                       (Author)    Electronically signed by : UZAIR Garcia ; Dec 12 2017 10:06AM EST

## 2017-12-18 NOTE — ED PROCEDURE NOTE
After Visit Summary   12/18/2017    Claudia Dueñas    MRN: [id number]           Patient Information     Date Of Birth          2012        Visit Information        Provider Department      12/18/2017 10:15 AM Gian Garcia MD [facility]        Today's Diagnoses     Preop general physical exam    -  1    Gastroesophageal reflux disease without esophagitis        Loss of weight          Care Instructions      Before Your Child s Surgery or Sedated Procedure      Please call the doctor if there s any change in your child s health, including signs of a cold or flu (sore throat, runny nose, cough, rash or fever). If your child is having surgery, call the surgeon s office. If your child is having another procedure, call your family doctor.    Do not give over-the-counter medicine within 24 hours of the surgery or procedure (unless the doctor tells you to).    If your child takes prescribed drugs: Ask the doctor which medicines are safe to take before the surgery or procedure.    Follow the care team s instructions for eating and drinking before surgery or procedure.     Have your child take a shower or bath the night before surgery, cleaning their skin gently. Use the soap the surgeon gave you. If you were not given special soap, use your regular soap. Do not shave or scrub the surgery site.    Have your child wear clean pajamas and use clean sheets on their bed.          Follow-ups after your visit        Your next 10 appointments already scheduled     Dec 29, 2017  9:40 AM CST   Return Visit with Anna Nicolsa MD   Peds Pulmonary ([facility])    [place] [address] [facility], [address] [place], [place] [address] [address] [place] [address] [phone]              Who to contact     If you have questions or need follow up information about today's clinic visit or your schedule please contact [facility] directly at [phone].  Normal or non-critical lab and  PROCEDURE  ECG 12 Lead Documentation  Date/Time: 12/7/2017 3:07 PM  Performed by: Niurka Goel  Authorized by: Niurka Goel     Indications / Diagnosis:  Abdomnial pain   ECG reviewed by me, the ED Provider: yes    Patient location:  ED  Previous ECG:     Previous ECG:  Unavailable  Interpretation:     Interpretation: non-specific    Rate:     ECG rate:  68    ECG rate assessment: normal    Rhythm:     Rhythm: sinus rhythm    Ectopy:     Ectopy: none    ST segments:     ST segments:  Non-specific  T waves:     T waves: non-specific "imaging results will be communicated to you by MyChart, letter or phone within 4 business days after the clinic has received the results. If you do not hear from us within 7 days, please contact the clinic through [employer] or phone. If you have a critical or abnormal lab result, we will notify you by phone as soon as possible.  Submit refill requests through [employer] or call your pharmacy and they will forward the refill request to us. Please allow 3 business days for your refill to be completed.          Additional Information About Your Visit        [employer] Information     [employer] lets you send messages to your doctor, view your test results, renew your prescriptions, schedule appointments and more. To sign up, go to [email or web address], contact your [facility] clinic or call [phone] during business hours.            Care EveryWhere ID     This is your Care EveryWhere ID. This could be used by other organizations to access your [facility] medical records  [id number]        Your Vitals Were     Pulse Temperature Height Pulse Oximetry BMI (Body Mass Index)       93 97  F (36.1  C) (Axillary) 3' 7.5\" (1.105 m) 98% 17.31 kg/m2        Blood Pressure from Last 3 Encounters:   12/18/17 112/57   11/17/17 122/67   11/04/17 94/60    Weight from Last 3 Encounters:   12/18/17 46 lb 9.6 oz (21.1 kg) (95 %)*   11/29/17 47 lb 14.4 oz (21.7 kg) (97 %)*   11/17/17 48 lb 4.5 oz (21.9 kg) (98 %)*     * Growth percentiles are based on CDC 2-20 Years data.              Today, you had the following     No orders found for display         Today's Medication Changes          These changes are accurate as of: 12/18/17 10:46 AM.  If you have any questions, ask your nurse or doctor.               These medicines have changed or have updated prescriptions.        Dose/Directions    gabapentin 250 MG/5ML solution   Commonly known as:  NEURONTIN   This may have changed:    - how much to take  - when to take this  - additional " instructions   Used for:  Restless legs syndrome (RLS)        Dose:  250 mg   Take 5 mLs (250 mg) by mouth At Bedtime   Quantity:  150 mL   Refills:  3       mupirocin 2 % ointment   Commonly known as:  BACTROBAN   This may have changed:    - when to take this  - reasons to take this   Used for:  Diaper rash        Apply topically daily   Quantity:  22 g   Refills:  1                Primary Care Provider Office Phone # Fax #    Gian Garcia [phone]       [address] [facility] [address]        Equal Access to Services     [facility]: Hadii leona alan hadasho Soedwar, waaxda luqadaha, qaybta kaalmada shahab, gamaliel handy . So [facility] [phone].    ATENCIÓN: Si habla español, tiene a genao disposición servicios gratuitos de asistencia lingüística. Llame al [phone].    We comply with applicable federal civil rights laws and [place] laws. We do not discriminate on the basis of race, color, national origin, age, disability, sex, sexual orientation, or gender identity.            Thank you!     Thank you for choosing [facility]  for your care. Our goal is always to provide you with excellent care. Hearing back from our patients is one way we can continue to improve our services. Please take a few minutes to complete the written survey that you may receive in the mail after your visit with us. Thank you!             Your Updated Medication List - Protect others around you: Learn how to safely use, store and throw away your medicines at www.disposemymeds.org.          This list is accurate as of: 12/18/17 10:46 AM.  Always use your most recent med list.                   Brand Name Dispense Instructions for use Diagnosis    acetaminophen 325 MG Suppository    TYLENOL    24 suppository    Place 1 suppository (325 mg) rectally every 4 hours as needed for fever    Fever, unspecified       * albuterol (2.5 MG/3ML) 0.083% neb solution      Take  1 vial by nebulization every 6 hours as needed for shortness of breath / dyspnea or wheezing        * albuterol 108 (90 BASE) MCG/ACT Inhaler    PROAIR HFA/PROVENTIL HFA/VENTOLIN HFA    1 Inhaler    Inhale 2 puffs into the lungs every 4 hours as needed for shortness of breath / dyspnea or wheezing    Moderate persistent asthma without complication       Cetirizine HCl 1 MG/ML Soln      GIVE 5ML BY MOUTH 2 TIMES DAILY        CIPRODEX otic suspension   Generic drug:  ciprofloxacin-dexamethasone      Twice weekly        cloNIDine 0.1 MG tablet    CATAPRES    60 tablet    Take 1.5 tablets (0.15 mg) by mouth At Bedtime    Sleep disorder       ferrous sulfate 75 (15 FE) MG/ML oral drops    NADER-IN-SOL    150 mL    Take 4.37 mLs (65.5 mg) by mouth daily    Iron deficiency       fluticasone 110 MCG/ACT Inhaler    FLOVENT HFA    1 Inhaler    Inhale 1 puff into the lungs 2 times daily    Moderate persistent asthma without complication       fluticasone 50 MCG/ACT spray    FLONASE    1 Bottle    Spray 2 sprays into both nostrils daily    Moderate persistent asthma with acute exacerbation, MARK (obstructive sleep apnea)       furosemide 10 MG/ML solution    LASIX     Take 2 mg/kg by mouth 2 times daily        gabapentin 250 MG/5ML solution    NEURONTIN    150 mL    Take 5 mLs (250 mg) by mouth At Bedtime    Restless legs syndrome (RLS)       ibuprofen 100 MG/5ML suspension    ADVIL/MOTRIN     Take 10 mg/kg by mouth every 6 hours as needed for fever or moderate pain        ipratropium 0.03 % spray    ATROVENT     USE 1-2 SPRAYS IN EACH NOSTRIL THREE TIMES A DAY 20-30 MINUTES BEFORE MEALS AS DIRECTED        lactobacillus rhamnosus (GG) packet      Take by mouth daily        lactulose 10 GM/15ML solution    CHRONULAC    1892 mL    Take 20 mLs by mouth 3 times daily    Other constipation       levothyroxine 88 MCG tablet    SYNTHROID/LEVOTHROID    90 tablet    Take 75 mcg by mouth One tablet daily for 6 days a week, then one and a  half tablets one day a week    Blood in stool, Constipation, unspecified constipation type, Gastroesophageal reflux disease, esophagitis presence not specified, Food protein induced enterocolitis syndrome       lidocaine-prilocaine cream    EMLA    30 g    Apply small amount to skin and cover with tegaderm or saran wrap 30 min before blood draw    Food protein induced enterocolitis syndrome, Other specified hypothyroidism       melatonin 5 MG sublingual tablet      Place 5 mg under the tongue nightly as needed for sleep        montelukast 4 MG chewable tablet    SINGULAIR    30 tablet    Take 1 tablet (4 mg) by mouth daily        mupirocin 2 % ointment    BACTROBAN    22 g    Apply topically daily    Diaper rash       NEOCATE Powd     12 Can    Take 1 Dose by mouth as needed Use as directed. Mix to 30 calories. 12 cans per month Use as directed. Mix to 30 calories. 12 cans per month    Multiple food allergies       omeprazole 20 MG CR capsule    priLOSEC          ondansetron 4 MG/5ML solution    ZOFRAN    15 mL    Take 3 mLs (2.4 mg) by mouth 2 times daily as needed for nausea or vomiting        * Notice:  This list has 2 medication(s) that are the same as other medications prescribed for you. Read the directions carefully, and ask your doctor or other care provider to review them with you.

## 2017-12-20 ENCOUNTER — TRANSCRIBE ORDERS (OUTPATIENT)
Dept: RADIOLOGY | Facility: MEDICAL CENTER | Age: 47
End: 2017-12-20

## 2017-12-20 ENCOUNTER — APPOINTMENT (OUTPATIENT)
Dept: LAB | Facility: MEDICAL CENTER | Age: 47
End: 2017-12-20
Payer: COMMERCIAL

## 2017-12-20 DIAGNOSIS — N17.9 ACUTE RENAL INJURY (HCC): ICD-10-CM

## 2017-12-20 LAB
ANION GAP SERPL CALCULATED.3IONS-SCNC: 7 MMOL/L (ref 4–13)
BUN SERPL-MCNC: 18 MG/DL (ref 5–25)
CALCIUM SERPL-MCNC: 9.5 MG/DL (ref 8.3–10.1)
CHLORIDE SERPL-SCNC: 104 MMOL/L (ref 100–108)
CO2 SERPL-SCNC: 28 MMOL/L (ref 21–32)
CREAT SERPL-MCNC: 1.13 MG/DL (ref 0.6–1.3)
ERYTHROCYTE [DISTWIDTH] IN BLOOD BY AUTOMATED COUNT: 13.1 % (ref 11.6–15.1)
GFR SERPL CREATININE-BSD FRML MDRD: 78 ML/MIN/1.73SQ M
GLUCOSE P FAST SERPL-MCNC: 88 MG/DL (ref 65–99)
HCT VFR BLD AUTO: 43.9 % (ref 36.5–49.3)
HGB BLD-MCNC: 15.1 G/DL (ref 12–17)
MCH RBC QN AUTO: 31.7 PG (ref 26.8–34.3)
MCHC RBC AUTO-ENTMCNC: 34.4 G/DL (ref 31.4–37.4)
MCV RBC AUTO: 92 FL (ref 82–98)
PLATELET # BLD AUTO: 278 THOUSANDS/UL (ref 149–390)
PMV BLD AUTO: 11 FL (ref 8.9–12.7)
POTASSIUM SERPL-SCNC: 4.1 MMOL/L (ref 3.5–5.3)
RBC # BLD AUTO: 4.76 MILLION/UL (ref 3.88–5.62)
SODIUM SERPL-SCNC: 139 MMOL/L (ref 136–145)
WBC # BLD AUTO: 6.54 THOUSAND/UL (ref 4.31–10.16)

## 2017-12-20 PROCEDURE — 85027 COMPLETE CBC AUTOMATED: CPT

## 2017-12-20 PROCEDURE — 36415 COLL VENOUS BLD VENIPUNCTURE: CPT

## 2017-12-20 PROCEDURE — 80048 BASIC METABOLIC PNL TOTAL CA: CPT

## 2017-12-20 NOTE — PRE-PROCEDURE INSTRUCTIONS
Pre-Surgery Instructions:   Medication Instructions    tamsulosin (FLOMAX) 0 4 mg Patient was instructed per "E-Preop"

## 2018-01-02 ENCOUNTER — ANESTHESIA EVENT (OUTPATIENT)
Dept: PERIOP | Facility: HOSPITAL | Age: 48
End: 2018-01-02
Payer: COMMERCIAL

## 2018-01-02 ENCOUNTER — APPOINTMENT (OUTPATIENT)
Dept: RADIOLOGY | Facility: HOSPITAL | Age: 48
End: 2018-01-02
Payer: COMMERCIAL

## 2018-01-02 ENCOUNTER — ANESTHESIA (OUTPATIENT)
Dept: PERIOP | Facility: HOSPITAL | Age: 48
End: 2018-01-02
Payer: COMMERCIAL

## 2018-01-02 ENCOUNTER — HOSPITAL ENCOUNTER (OUTPATIENT)
Facility: HOSPITAL | Age: 48
Setting detail: OUTPATIENT SURGERY
Discharge: HOME | End: 2018-01-02
Attending: UROLOGY | Admitting: UROLOGY
Payer: COMMERCIAL

## 2018-01-02 VITALS
DIASTOLIC BLOOD PRESSURE: 91 MMHG | WEIGHT: 220 LBS | SYSTOLIC BLOOD PRESSURE: 159 MMHG | TEMPERATURE: 97.3 F | OXYGEN SATURATION: 97 % | BODY MASS INDEX: 28.23 KG/M2 | HEIGHT: 74 IN | HEART RATE: 76 BPM | RESPIRATION RATE: 16 BRPM

## 2018-01-02 DIAGNOSIS — N20.0 CALCULUS OF KIDNEY: ICD-10-CM

## 2018-01-02 PROCEDURE — C1769 GUIDE WIRE: HCPCS | Performed by: UROLOGY

## 2018-01-02 PROCEDURE — 87086 URINE CULTURE/COLONY COUNT: CPT | Performed by: UROLOGY

## 2018-01-02 PROCEDURE — C2617 STENT, NON-COR, TEM W/O DEL: HCPCS | Performed by: UROLOGY

## 2018-01-02 PROCEDURE — 82360 CALCULUS ASSAY QUANT: CPT | Performed by: UROLOGY

## 2018-01-02 PROCEDURE — 74420 UROGRAPHY RTRGR +-KUB: CPT

## 2018-01-02 DEVICE — STENT URETERAL 6 FR 26CM INLAY OPTIMA: Type: IMPLANTABLE DEVICE | Site: KIDNEY | Status: FUNCTIONAL

## 2018-01-02 RX ORDER — LIDOCAINE HYDROCHLORIDE 10 MG/ML
INJECTION, SOLUTION INFILTRATION; PERINEURAL AS NEEDED
Status: DISCONTINUED | OUTPATIENT
Start: 2018-01-02 | End: 2018-01-02 | Stop reason: SURG

## 2018-01-02 RX ORDER — HYDROCODONE BITARTRATE AND ACETAMINOPHEN 5; 325 MG/1; MG/1
2 TABLET ORAL EVERY 6 HOURS PRN
Qty: 12 TABLET | Refills: 0 | Status: SHIPPED | OUTPATIENT
Start: 2018-01-02 | End: 2018-01-12

## 2018-01-02 RX ORDER — FENTANYL CITRATE 50 UG/ML
INJECTION, SOLUTION INTRAMUSCULAR; INTRAVENOUS AS NEEDED
Status: DISCONTINUED | OUTPATIENT
Start: 2018-01-02 | End: 2018-01-02 | Stop reason: SURG

## 2018-01-02 RX ORDER — CIPROFLOXACIN 500 MG/1
500 TABLET, FILM COATED ORAL 2 TIMES DAILY
Qty: 10 TABLET | Refills: 0 | Status: SHIPPED | OUTPATIENT
Start: 2018-01-02 | End: 2018-01-07

## 2018-01-02 RX ORDER — FENTANYL CITRATE/PF 50 MCG/ML
50 SYRINGE (ML) INJECTION
Status: DISCONTINUED | OUTPATIENT
Start: 2018-01-02 | End: 2018-01-02 | Stop reason: HOSPADM

## 2018-01-02 RX ORDER — ONDANSETRON 2 MG/ML
4 INJECTION INTRAMUSCULAR; INTRAVENOUS ONCE AS NEEDED
Status: DISCONTINUED | OUTPATIENT
Start: 2018-01-02 | End: 2018-01-02 | Stop reason: HOSPADM

## 2018-01-02 RX ORDER — SODIUM CHLORIDE, SODIUM LACTATE, POTASSIUM CHLORIDE, CALCIUM CHLORIDE 600; 310; 30; 20 MG/100ML; MG/100ML; MG/100ML; MG/100ML
20 INJECTION, SOLUTION INTRAVENOUS CONTINUOUS
Status: DISCONTINUED | OUTPATIENT
Start: 2018-01-02 | End: 2018-01-02 | Stop reason: HOSPADM

## 2018-01-02 RX ORDER — PROPOFOL 10 MG/ML
INJECTION, EMULSION INTRAVENOUS AS NEEDED
Status: DISCONTINUED | OUTPATIENT
Start: 2018-01-02 | End: 2018-01-02 | Stop reason: SURG

## 2018-01-02 RX ORDER — METOCLOPRAMIDE HYDROCHLORIDE 5 MG/ML
10 INJECTION INTRAMUSCULAR; INTRAVENOUS ONCE AS NEEDED
Status: DISCONTINUED | OUTPATIENT
Start: 2018-01-02 | End: 2018-01-02 | Stop reason: HOSPADM

## 2018-01-02 RX ORDER — MAGNESIUM HYDROXIDE 1200 MG/15ML
LIQUID ORAL AS NEEDED
Status: DISCONTINUED | OUTPATIENT
Start: 2018-01-02 | End: 2018-01-02 | Stop reason: HOSPADM

## 2018-01-02 RX ORDER — HYDROCODONE BITARTRATE AND ACETAMINOPHEN 5; 325 MG/1; MG/1
2 TABLET ORAL EVERY 4 HOURS PRN
Status: DISCONTINUED | OUTPATIENT
Start: 2018-01-02 | End: 2018-01-02 | Stop reason: HOSPADM

## 2018-01-02 RX ORDER — MIDAZOLAM HYDROCHLORIDE 1 MG/ML
INJECTION INTRAMUSCULAR; INTRAVENOUS AS NEEDED
Status: DISCONTINUED | OUTPATIENT
Start: 2018-01-02 | End: 2018-01-02 | Stop reason: SURG

## 2018-01-02 RX ORDER — ONDANSETRON 2 MG/ML
INJECTION INTRAMUSCULAR; INTRAVENOUS AS NEEDED
Status: DISCONTINUED | OUTPATIENT
Start: 2018-01-02 | End: 2018-01-02 | Stop reason: SURG

## 2018-01-02 RX ADMIN — FENTANYL CITRATE 50 MCG: 50 INJECTION, SOLUTION INTRAMUSCULAR; INTRAVENOUS at 16:28

## 2018-01-02 RX ADMIN — LIDOCAINE HYDROCHLORIDE 50 MG: 10 INJECTION, SOLUTION INFILTRATION; PERINEURAL at 16:28

## 2018-01-02 RX ADMIN — FENTANYL CITRATE 25 MCG: 50 INJECTION, SOLUTION INTRAMUSCULAR; INTRAVENOUS at 16:45

## 2018-01-02 RX ADMIN — CEFAZOLIN SODIUM 2000 MG: 2 SOLUTION INTRAVENOUS at 16:30

## 2018-01-02 RX ADMIN — MIDAZOLAM HYDROCHLORIDE 2 MG: 1 INJECTION, SOLUTION INTRAMUSCULAR; INTRAVENOUS at 16:19

## 2018-01-02 RX ADMIN — ONDANSETRON 4 MG: 2 INJECTION INTRAMUSCULAR; INTRAVENOUS at 16:52

## 2018-01-02 RX ADMIN — PROPOFOL 200 MG: 10 INJECTION, EMULSION INTRAVENOUS at 16:28

## 2018-01-02 RX ADMIN — FENTANYL CITRATE 25 MCG: 50 INJECTION, SOLUTION INTRAMUSCULAR; INTRAVENOUS at 16:37

## 2018-01-02 RX ADMIN — DEXAMETHASONE SODIUM PHOSPHATE 10 MG: 10 INJECTION INTRAMUSCULAR; INTRAVENOUS at 16:37

## 2018-01-02 RX ADMIN — SODIUM CHLORIDE, SODIUM LACTATE, POTASSIUM CHLORIDE, AND CALCIUM CHLORIDE 20 ML/HR: .6; .31; .03; .02 INJECTION, SOLUTION INTRAVENOUS at 13:53

## 2018-01-02 NOTE — DISCHARGE INSTRUCTIONS
Today you underwent right-sided ureteroscopy  On Friday morning, remove the tape, pull the string and remove your stent  Antibiotics and pain medication as prescribed  Call for follow-up with Dr Carey Vizcaino or Dr Priscila Hays on the  [facility] at [phone]  [phone]  Ureteroscopy   WHAT YOU NEED TO KNOW:   A ureteroscopy is a procedure to examine in the inside of your urinary tract, which includes your urethra, bladder, ureters, and kidneys  A ureteroscope is a small, thin tube with a light and camera on the end  Ureteroscopy can help your healthcare provider diagnose and treat problems in your urinary tract, such as kidney stones  DISCHARGE INSTRUCTIONS:   Medicine:   · Antibiotics  may be given to treat or prevent an infection  · Take your medicine as directed  Contact your healthcare provider if you think your medicine is not helping or if you have side effects  Tell him or her if you are allergic to any medicine  Keep a list of the medicines, vitamins, and herbs you take  Include the amounts, and when and why you take them  Bring the list or the pill bottles to follow-up visits  Carry your medicine list with you in case of an emergency  Follow up with your healthcare provider as directed:  Write down your questions so you remember to ask them during your visits  Drink liquids as directed  Liquids can help prevent kidney stones and urinary tract infections  Drink water and limit the amount of caffeine you drink  Caffeine may be found in coffee, tea, soda, sports drinks, and foods  Ask your healthcare provider how much liquid to drink each day  Contact your healthcare provider if:   · You have a fever  · You cannot urinate  · You have blood in your urine  · You are vomiting  · You have pain in your abdomen or side  · You have questions or concerns about your condition or care    © 2017 [address] [employer] Information is for End User's use only and may not be sold, redistributed or otherwise used for commercial purposes  All illustrations and images included in CareNotes® are the copyrighted property of A D A M , Inc  or Saturnino Moody  The above information is an  only  It is not intended as medical advice for individual conditions or treatments  Talk to your doctor, nurse or pharmacist before following any medical regimen to see if it is safe and effective for you

## 2018-01-02 NOTE — ANESTHESIA PREPROCEDURE EVALUATION
Review of Systems/Medical History  Patient summary reviewed  Chart reviewed  No history of anesthetic complications     Cardiovascular  Hypertension ,   Comment: 12/8/2017: LEFT VENTRICLE:  Systolic function was normal  Ejection fraction was estimated to be 60 %  There were no regional wall motion abnormalities  ,  Pulmonary  Negative pulmonary ROS ,        GI/Hepatic            Endo/Other     GYN       Hematology   Musculoskeletal       Neurology   Psychology           Physical Exam    Airway    Mallampati score: II  TM Distance: >3 FB  Neck ROM: full     Dental       Cardiovascular      Pulmonary      Other Findings        Anesthesia Plan  ASA Score- 2     Anesthesia Type- general with ASA Monitors  Additional Monitors:   Airway Plan: LMA  Plan Factors-    Induction- intravenous  Postoperative Plan-     Informed Consent- Anesthetic plan and risks discussed with patient  I personally reviewed this patient with the CRNA  Discussed and agreed on the Anesthesia Plan with the CRNA  Katty Vargas

## 2018-01-02 NOTE — OP NOTE
OPERATIVE REPORT  PATIENT NAME: Serjio Cerda    :  1970  MRN: [id number]  Pt Location: [facility] CYSTO ROOM 01    SURGERY DATE: 2018    Surgeon(s) and Role:     * Clayton Johnson MD - Primary    Preop Diagnosis:  Calculus of kidney [N20 0]  Distal right ureteral calculus    Post-Op Diagnosis Codes:     * Calculus of kidney [N20 0]  Right mid pole calculus    Procedure:  Cystoscopy, right ureteroscopy, basket stone retrieval, right retrograde pyelography, right ureteral stent exchange    Specimen(s):  ID Type Source Tests Collected by Time Destination   A :  Urine Urine, Cystoscopic URINE CULTURE Clayton Johnson MD 2018 164    B :  Calculus Kidney, Right STONE ANALYSIS Clayton Johnson MD 2018 164        Estimated Blood Loss:   Minimal    Drains:  Right 26-6 Albanian double-J ureteral stent       Anesthesia Type:   General    Operative Indications:  Calculus of kidney [N20 0]      Operative Findings:  3-4 mm stone identified in a right mid pole calyx  John Leonie was easily removed with a dimension 0 tip basket  Complications:   None      Procedure Description: Serjio Cerda is a 56y o -year-old male  with a history of a distal right 3 mm ureteral calculus  The patient presented to [address] [place] [address] in early 2017  He had exquisite right-sided flank pain with what appeared to be a calyceal rupture secondary to a small distal right ureteral stone  At that time I attempted right-sided ureteroscopy, however, the patient's distal right ureter was so narrow in caliber that I was unable to pass the scope to the level of the stone  I therefore placed a right ureteral stent alone and the patient now returns for interval right-sided ureteroscopy     Risk and benefits of ureteroscopy were discussed and reviewed  Informed consent was obtained  The patient was brought to the operating room on 2018     After the smooth induction of general LMA anesthesia, the patient was placed in the dorsal lithotomy position  His genitalia was prepped and draped in a sterile fashion  Intravenous antibiotics were administered  A timeout was performed with all members of the operative team confirmed the patient's identity, procedure to be performed, and laterality of the case  A 22 Dutch rigid cystoscope with 30° lens was inserted  The bladder was thoroughly inspected  It was no evidence of mucosal abnormalities or lesions  There were no calculi identified  Attention was focused on the right ureteral orifice  A stent was visualized coming from the right ureteral orifice  The stent was grasped and easily removed  A wire was inserted in the stent was removed  A 12 Dutch Toledo catheter was placed for continuous bladder drainage and a short semi-rigid ureteral scope was then passed adjacent to the wire  The distal ureter was engaged  Although the ureter had dilated with the stent, was still relatively narrowing caliber  The semi-rigid ureteral scope was passed all the way into the right proximal ureter and the stone was not identified  Contrast was injected and no filling defects were identified  I felt it was possible that the stone had been pushed back into the right kidney at the time of retrograde pyelography at his initial stent insertion  I therefore placed a 2nd wire followed by an Olympus 5 3 Western Araceli flexible ureteral scope over top of the 2nd wire  There were no other stones visualized on this CT scan within the kidney  On flexible right renoscopy, a 3-4 mm stone was identified in a right mid pole calyx  I was able to grasp the stone with a dimension 0 tip basket and remove it within its entirety  I then repassed the ureteral scope and thoroughly reinspected all calices to ensure that there were no missed stones  The scope was slowly withdrawn through the entire length of the ureter and there were no ureteral stones identified  The wire was backloaded through the cystoscope   The cystoscope was repassed into the bladder  A 26-6 Belarusian right double-J ureteral stent was then placed without difficulty  The proximal coil was appreciated in the right renal pelvis and the distal coil was visualized within the bladder  The bladder was emptied and the cystoscope was removed  A string was left in place and secured to the dorsum of the phallus with Tegaderm  Overall the patient tolerated the procedure well and were no complications  The patient was extubated in the operating room and transferred to the PACU in stable condition at the conclusion of the case      Patient Disposition:  PACU stable and extubated    SIGNATURE: Kody Reilly MD  DATE: January 2, 2018  TIME: 5:00 PM

## 2018-01-02 NOTE — ANESTHESIA POSTPROCEDURE EVALUATION
Post-Op Assessment Note      CV Status:  Stable    Mental Status:  Alert and awake    Hydration Status:  Euvolemic and stable    PONV Controlled:  None    Airway Patency:  Patent    Post Op Vitals Reviewed: Yes          Staff: CRNA           /86 (01/02/18 1708)    Temp 98 2 °F (36 8 °C) (01/02/18 1708)    Pulse 82 (01/02/18 1708)   Resp 16 (01/02/18 1708)    SpO2 99 % (01/02/18 1708)

## 2018-01-04 LAB — BACTERIA UR CULT: NORMAL

## 2018-01-05 ENCOUNTER — GENERIC CONVERSION - ENCOUNTER (OUTPATIENT)
Dept: OTHER | Facility: OTHER | Age: 48
End: 2018-01-05

## 2018-01-10 NOTE — RESULT NOTES
Verified Results  (1) TESTOSTERONE, FREE (DIRECT) AND TOTAL [id number] 07:46AM Chris Mini   PERFORMED AT: [address]  [facility] [address] [address] [id number] DIRECTOR: Nayeli Lafleur MD   PHONE: [phone]     Test Name Result Flag Reference   TESTOSTERONE 314 ng/dL L [phone]   COMMENT Comment     Adult male reference interval is based on a population of lean males  up to 36years old  FREE TESTOSTERONE, DIRECT 8 6 pg/mL  6 8-21 5     (1) PSA (SCREEN) (Dx V76 44 Screen for Prostate Cancer) [id number] 07:46AM Chris Mini     Test Name Result Flag Reference   PSA 1 3 ng/mL   0-4 0   PSA values from one laboratory may not be comparable to those from  another because of the various testing technologies employed  Additionally, PSA results can not be interpreted as absolute evidence of  the presence or absence of disease  This PSA value was obtained by [employer]  Centaur Chemiluminometric Immunoassay  (1) LIPID PANEL, FASTING [id number] 07:46AM Chris Mini   Has the patient been fasting for 10-12 hours? -     Test Name Result Flag Reference   TRIGLYCERIDES 115 mg/dL     TRIGLYCERIDE:  Normal              <150 mg/dl  Borderline High    150-199 mg/dl  High               200-499 mg/dl  Very High          >499 mg/dl  _______________________________________   CHOLESTEROL 235 mg/dL     CHOLESTEROL:  Desirable        <200 mg/dl  Borderline High  200-239 mg/dl  High             >239 mg/dl  ____________________________________   HDL,DIRECT 44 mg/dL     HDL:  High       >59 mg/dl  Low        <41 mg/dl  ______________________________   LDL CHOLESTEROL CALCULATED 168 mg/dL H 0-100   LDL, CALCULATED:  This screening LDL is a calculated result  It does not have the accuracy of the Direct Measured LDL in the  monitoring of patients with hyperlipidemia and/or statin therapy    Direct Measured LDL (Test Code 3126) must be ordered separately in these  patients   ______________________________

## 2018-01-11 LAB
CA PHOS MFR STONE: 5 %
COLOR STONE: NORMAL
COM MFR STONE: 95 %
COMMENT-STONE3: NORMAL
COMPOSITION: NORMAL
LABORATORY COMMENT REPORT: NORMAL
NIDUS STONE QL: NORMAL
PHOTO: NORMAL
SIZE STONE: NORMAL MM
STONE ANALYSIS-IMP: NORMAL
WT STONE: 9.5 MG

## 2018-01-11 NOTE — RESULT NOTES
Verified Results  (1) INFLUENZA A/B AND RSV, PCR [id number] 02:25PM Nicole Neither   TW Order Number: [id number]     Test Name Result Flag Reference   INFLUENZA A/MATRIX None Detected  None Detected   INFLUENZA B None Detected  None Detected   RESP SYNCYTIAL VIRUS Detected A None Detected

## 2018-01-13 VITALS
DIASTOLIC BLOOD PRESSURE: 84 MMHG | WEIGHT: 210 LBS | BODY MASS INDEX: 26.95 KG/M2 | SYSTOLIC BLOOD PRESSURE: 122 MMHG | HEIGHT: 74 IN

## 2018-01-14 VITALS
DIASTOLIC BLOOD PRESSURE: 82 MMHG | WEIGHT: 217 LBS | BODY MASS INDEX: 27.85 KG/M2 | HEIGHT: 74 IN | SYSTOLIC BLOOD PRESSURE: 120 MMHG | TEMPERATURE: 98 F

## 2018-01-18 NOTE — MISCELLANEOUS
Message  Return to work or school:   Danelle Rosas is under my professional care   He was seen in my office on 11/13/17             Signatures   Electronically signed by : CHRISTIAN Crowder ; Nov 13 2017  4:14PM EST                       (Author)

## 2018-01-23 VITALS
DIASTOLIC BLOOD PRESSURE: 80 MMHG | HEIGHT: 74 IN | BODY MASS INDEX: 28.36 KG/M2 | SYSTOLIC BLOOD PRESSURE: 128 MMHG | WEIGHT: 221 LBS | HEART RATE: 66 BPM

## 2018-01-23 NOTE — MISCELLANEOUS
Message   Recorded as Task   Date: 01/04/2018 08:59 AM, Created By: Omar Mcallister   Task Name: Care Coordination   Assigned To: Omar Mcallister   Regarding Patient: Shaina Gong, Status: Complete   Comment:    Omar Mcallister - 04 Jan 2018 8:59 AM     TASK CREATED  LM for patient to call regarding stent removal   Omar Just - 04 Jan 2018 9:54 AM     TASK EDITED  Patient was given specific instructions for stent removal and will call after removed   Omar Just - 05 Jan 2018 8:50 AM     TASK EDITED  PC from patient stating that stent was removed intact and he is doing well   Omar Mcallister - 05 Jan 2018 8:50 AM     TASK COMPLETED        Active Problems    1  Benign non-nodular prostatic hyperplasia with lower urinary tract symptoms (600 91)   (N40 1)   2  BPV (benign positional vertigo) (386 11) (H81 10)   3  Coccydynia (724 79) (M53 3)   4  Contact dermatitis due to poison ivy (692 6) (L23 7)   5  Depression screening (V79 0) (Z13 89)   6  Dermatitis (692 9) (L30 9)   7  Enlarged prostate without lower urinary tract symptoms (luts) (600 00) (N40 0)   8  Erectile dysfunction of non-organic origin (302 72) (F52 21)   9  Fatigue (780 79) (R53 83)   10  High risk medication use (V58 69) (Z79 899)   11  Hyperlipidemia (272 4) (E78 5)   12  Kidney calculus (592 0) (N20 0)   13  Low testosterone (259 9) (E34 9)   14  Nausea (787 02) (R11 0)   15  Need for influenza vaccination (V04 81) (Z23)   16  Tarsal tunnel syndrome, unspecified laterality (355 5) (G57 50)   17  Vitamin D deficiency (268 9) (E55 9)    Current Meds   1  Flomax 0 4 MG Oral Capsule (Tamsulosin HCl); Therapy: [id number] to Recorded    Allergies    1  No Known Drug Allergies    Signatures   Electronically signed by :  Steph Huerta, ; Jan 5 2018  8:54AM EST                       (Author)

## 2018-01-23 NOTE — PROGRESS NOTES
Preliminary Nursing Report                Patient Information    Initial Encounter Entry Date:   2017 12:06 PM EST (Automated Transmission Automated Transmission)       Last Modified:   {Mamie Vuong}              Legal Name: August Number:        YOB: 1970        Age (years): 56        Gender: M        Body Mass Index (BMI): 29 kg/m2        Height: 74 in  Weight: 228 lbs (103 kgs)           Address:   [address] [place] [address] [address] [address] [address] [facility]              Phone: [phone]   (consent to leave messages)        Email:        Ethnicity: Decline to State        Caodaism:        Marital Status:        Preferred Language: English        Race: Other Race                    Patient Insurance Information        Primary Insurance Information Carrier Name: {Primary  CarrierName}           Carrier Address:   {Primary  CarrierAddress}              Carrier Phone: {Primary  CarrierPhone}          Group Number: {Primary  GroupNumber}          Policy Number: {Primary  PolicyNumber}          Insured Name: {Primary  InsuredName}          Insured : {Primary  InsuredDOB}          Relationship to Insured: {Primary  RelationshiptoInsured}           Secondary Insurance Information Carrier Name: {Secondary  CarrierName}           Carrier Address:   {Secondary  CarrierAddress}              Carrier Phone: {Secondary  CarrierPhone}          Group Number: {Secondary  GroupNumber}          Policy Number: {Secondary  PolicyNumber}          Insured Name: {Secondary  InsuredName}          Insured : {Secondary  InsuredDOB}          Relationship to Insured: {Secondary  RelationshiptoInsured}                       Health Profile   Booking #:   Sangeetha Calles #: [phone]               DOS: 2018    Surgery : CYSTOURETHROSCOPY, WITH URETEROSCOPY AND/OR PYELOSCOPY;    Add'l Procedures/Notes:     Surgery Risk: Minor          Precautions          Allergies    No Known Drug Allergies Medications    Meclizine HCl - 25 MG Oral Tablet               Conditions    Benign non-nodular prostatic hyperplasia with lower urinary tract symptoms       Benign prostatic hypertrophy       BPV (benign positional vertigo)       Coccydynia       Contact dermatitis due to poison ivy       Depression screening       Erectile dysfunction of non-organic origin       Fatigue       High risk medication use       Hyperlipidemia       Kidney calculus       Low testosterone       Nausea       Need for influenza vaccination       Tarsal tunnel syndrome, unspecified laterality       Vitamin D deficiency               Family History    None             Surgical History    None             Social History    Denies Drug use       Never a smoker       Never Drank Alcohol                               Patient Instructions       Medical Procedure Risk  NPO Instructions   The day before surgery it is recommended to have a light dinner at your usual time and you are allowed a light snack early in the evening  Do not eat anything heavy or eat a big meal after 7pm  Do not eat or drink anything after midnight prior to your surgery  If you are supposed to take any of your medications, do so with a sip of water  Failure to follow these instructions can lead to an increased risk of lung complications and may result in a delay or cancellation of your procedure  If you have any questions, contact your institution for further instructions  No candy, no gum, no mints, no chewing tobacco                Testing Considerations       No recommendations for this classification  Consultations       No recommendations for this classification  Miscellaneous Questions         Question: Are you able to walk up a flight of stairs, walk up a hill or do heavy housework WITHOUT having chest pain or shortness of breath?        Answer: YES                   Allergies/Conditions/Medications Not Found        The following were not recognized by our system when generating the recommendations  Please consider if this would impact any preoperative protocols  ? Never a smoker       ? Never Drank Alcohol                  Appointment Information         Date:    01/02/2018        [facility]        Address:           Directions:                      Footnotes revision 14      ?? Denotes a free-text entry  Legal Disclaimer: Any and all recommendations and services provided herein are designed to assist in the preoperative care of the patient  Nothing contained herein is designed to replace, eliminate or alleviate the responsibility of the attending physician to supervise and determine the patient?s preoperative care and course of treatment  Failure to provide complete, accurate information may negatively impact the system?s ability to recommend the proper preoperative protocol  THE ATTENDING PHYSICIAN IS RESPONSIBLE TO REVIEW THE SUGGESTED PREOPERATIVE PROTOCOLS/COURSE OF TREATMENT AND PRESCRIBE THE FINAL COURSE OF PREOPERATIVE TREATMENT IN CONSULTATION WITH THE PATIENT  THE ePREOP SYSTEM AND ITS MATERIALS ARE PROVIDED ? AS IS? WITHOUT WARRANTY OF ANY KIND, EXPRESS OR IMPLIED, INCLUDING, BUT NOT LIMITED TO, WARRANTIES OF PERFORMANCE OR MERCHANTABILITY OR FITNESS FOR A PARTICULAR PURPOSE  PATIENT AND PHYSICIANS HEREBY AGREE THAT THEIR USE OF THE MATERIALS AND RESOURCES ACT AS A CONSENT TO RELEASE AND WAIVE ePREOP FROM ANY AND ALL CLAIMS OF WARRANTY, TORT OR CONTRACT LAW OF ANY KIND             Electronically signed by:Eligio Dubois MD  Dec 12 2017 11:01PM EST

## 2018-01-23 NOTE — MISCELLANEOUS
History of Present Illness  TCM Communication [facility]: The patient is being contacted for follow-up after hospitalization and CALLED PT STATED HE WOULD CALL TO SCHEDULE NO NEED TO CALL HIM NO ISAI PT DIDNT RETURN CALL  He was hospitalized at [facility]  The date of admission: 12-7-17, date of discharge: 12-8-17  Diagnosis: R URETERAL STONE WITH PAIN AND HYDRONEPHROSIS WITH HEPATIC LESION AND YVONNE  He was discharged to home  Medications were not reviewed today  Follow-up appointments with other specialists: UROLOGY AND ULTRASOUND IN 6 MONTHS FOR SMALL RENAL CYST  The patient is currently asymptomatic  Communication performed and completed by Juan Escalona   HPI: PT HAD R SIDED FLANK PAIN RADIATING TO R GROIN THAT WAS NOT RELIEVED WITH TORADOL OR OTHER PAIN MEDICATIONS  PT HAD AN ELEVATED TROPONIN CONSISTENT WITH YVONNE  PT WAS SENT TO OR FOR CYSTOSCOPY R RIGHT STENT PLACEMENT  PT HAD A LIVER LESION THAT IS A BENIGN HEMANGIOMA AND SMALL RENAL CYST THAT WILL REQUIRE AN ULTRASOUND IN 6 MONTHS  PT HAD URINARY DISCOMFORT THAT RESOLVED AFTER PROCEDURE PT WILL FOLLOW UP WITH UROLOGY  ;PT WAS DISCHARGED WITH [facility] AND [facility]  Active Problems    1  Benign non-nodular prostatic hyperplasia with lower urinary tract symptoms (600 91)   (N40 1)   2  BPV (benign positional vertigo) (386 11) (H81 10)   3  Coccydynia (724 79) (M53 3)   4  Contact dermatitis due to poison ivy (692 6) (L23 7)   5  Depression screening (V79 0) (Z13 89)   6  Dermatitis (692 9) (L30 9)   7  Enlarged prostate without lower urinary tract symptoms (luts) (600 00) (N40 0)   8  Erectile dysfunction of non-organic origin (302 72) (F52 21)   9  Fatigue (780 79) (R53 83)   10  High risk medication use (V58 69) (Z79 899)   11  Hyperlipidemia (272 4) (E78 5)   12  Kidney calculus (592 0) (N20 0)   13  Low testosterone (259 9) (E34 9)   14  Nausea (787 02) (R11 0)   15  Need for influenza vaccination (V04 81) (Z23)   16   Tarsal tunnel syndrome, unspecified laterality (355  5) (G57 50)   17  Vitamin D deficiency (268 9) (E55 9)    Past Medical History    1  History of Acute medial meniscus tear of left knee (836 0) (S83 242A)   2  History of Acute upper respiratory infection (465 9) (J06 9)   3  History of Allergy to insects (V15 06) (Z91 038)   4  History of Bee sting (989 5,E905 3) (T63 441A)   5  History of Contact dermatitis (692 9) (L25 9)   6  History of Fatigue (780 79) (R53 83)   7  History of Finger infection (686 9) (L08 9)   8  History of back pain (V13 59) (Z87 39)   9  History of herpes zoster (V12 09) (Z86 19)    Surgical History    1  History of Appendectomy   2  History of Knee Surgery   3  History of Oral Surgery Tooth Extraction   4  History of Tonsillectomy With Adenoidectomy    Family History  Mother    1  Family history of Type 2 diabetes mellitus (250 00) (E11 9)  Father    2  Family history of Esophageal Cancer (150 9)   3  Family history of hypertension (V17 49) (Z82 49)   4  Family history of Prostate cancer (80) (C61)    Social History    · Denied: Drug use (305 90) (F19 90)   · Never a smoker   · Never Drank Alcohol    Current Meds   1  Flomax 0 4 MG Oral Capsule; Therapy: [id number] to Recorded    Allergies    1   No Known Drug Allergies    Signatures   Electronically signed by : Etta Baron, ; Dec 22 2017  8:32AM EST                       (Author)    Electronically signed by : Jostin Carreno DO; Dec 26 2017  2:30PM EST                       (Author)

## 2018-03-04 DIAGNOSIS — N20.0 CALCULUS OF KIDNEY: ICD-10-CM

## 2018-03-09 PROBLEM — N20.0 CALCULUS OF KIDNEY: Status: ACTIVE | Noted: 2018-03-09

## 2018-03-09 NOTE — PROGRESS NOTES
3/12/2018      Chief Complaint   Patient presents with    Nephrolithiasis       Assessment and Plan    56 y o  male managed by Dr Carey Vizcaino    1  Nephrolithasis s/p ureteroscopy 1/2/18  - Stone analysis reveals a 95% calcium oxalate stone and 5% calcium phosphate stone  - Recommend increased hydration with water and lemon, low sodium and oxalate diet, moderate calcium intake, and avoidance of excessive vitamins C and D  - KUB reveals no calculi but stable phleboliths, FU 1 year with KUB prior          History of Present Illness  Yousif Johnson is a 56 y o  male here for follow up evaluation of a 3mm obstructing right calculus  He had a right stent placed 12/8/17 in the setting of hydroureteronephrosis and a forniceal rupture  Stent was not placed for infection but a narrow ureter not allowing for scope passage  Subsequently underwent ureteroscopy and stone removal 1/2/18  Has been doing well since with no complaints or stone passage  Stone analysis reveals a 95% calcium oxalate stone and 5% calcium phosphate stone  Review of Systems   Constitutional: Negative for activity change, chills and fever  Gastrointestinal: Negative for abdominal distention and abdominal pain  Musculoskeletal: Negative for back pain and gait problem  Psychiatric/Behavioral: Negative for behavioral problems and confusion       Past Medical History  Past Medical History:   Diagnosis Date    Kidney stone        Past Social History  Past Surgical History:   Procedure Laterality Date    APPENDECTOMY      KNEE ARTHROSCOPY      x3    NJ CYSTO/URETERO W/LITHOTRIPSY &INDWELL STENT INSRT  12/8/2017    Procedure: CYSTOSCOPY URETEROSCOPY  RETROGRADE PYELOGRAM AND INSERTION STENT URETERAL;  Surgeon: Natividad Parisi MD;  Location: [facility];  Service: Urology    [place] CYSTO/URETERO W/LITHOTRIPSY &INDWELL STENT INSRT Right 1/2/2018    Procedure: CYSTOSCOPY, RIGHT RETROGRADE PYELOGRAM, RIGHT URETEROSCOPY WITH  STONE MANIPULATION AND EXTRACTION, RIGHT DOUBLE J STENT EXCHANGE;  Surgeon: Marlene Perry MD;  [facility]: [facility];  Service: Urology    TONSILLECTOMY      WISDOM TOOTH EXTRACTION       History   Smoking Status    Never Smoker   Smokeless Tobacco    Never Used       Past Family History  History reviewed  No pertinent family history  Past Social history  Social History     Social History    Marital status: /Civil Union     Spouse name: N/A    Number of children: N/A    Years of education: N/A     Occupational History          Social History Main Topics    Smoking status: Never Smoker    Smokeless tobacco: Never Used    Alcohol use No    Drug use: No    Sexual activity: Not on file     Other Topics Concern    Not on file     Social History Narrative    No narrative on file       Current Medications  Current Outpatient Prescriptions   Medication Sig Dispense Refill    meclizine (ANTIVERT) 25 mg tablet Take by mouth      tamsulosin (FLOMAX) 0 4 mg Take 1 capsule by mouth daily with dinner 30 capsule 0     No current facility-administered medications for this visit  Allergies  No Known Allergies      The following portions of the patient's history were reviewed and updated as appropriate: allergies, current medications, past medical history, past social history, past surgical history and problem list       Vitals  Vitals:    03/12/18 1002   BP: 110/80   Pulse: 60   Weight: 100 kg (221 lb)         Physical Exam    Constitutional   General appearance: Patient is seated and in no acute distress, well appearing and well nourished  Head and Face   Head and face: Normal     Eyes   Conjunctiva and lids: No erythema, swelling or discharge  Ears, Nose, Mouth, and Throat   Hearing: Normal     Pulmonary   Respiratory effort: No increased work of breathing or signs of respiratory distress      Cardiovascular   Examination of extremities for edema and/or varicosities: Normal     Abdomen Abdomen: Non-tender, no masses  Musculoskeletal   Gait and station: Normal   Skin   Skin and subcutaneous tissue: Warm, dry, and intact  No visible lesions or rashes  Psychiatric   Judgment and insight: Normal  Recent and remote memory:  Normal  Mood and affect: Normal        Results  No results found for this or any previous visit (from the past 1 hour(s)) ]  Lab Results   Component Value Date    PSA 1 3 01/08/2016    PSA 1 4 09/08/2015     Lab Results   Component Value Date    GLUCOSE 97 12/08/2017    CALCIUM 9 5 12/20/2017     12/20/2017    K 4 1 12/20/2017    CO2 28 12/20/2017     12/20/2017    BUN 18 12/20/2017    CREATININE 1 13 12/20/2017     Lab Results   Component Value Date    WBC 6 54 12/20/2017    HGB 15 1 12/20/2017    HCT 43 9 12/20/2017    MCV 92 12/20/2017     12/20/2017           Orders  Orders Placed This Encounter   Procedures    XR abdomen 1 view kub     Standing Status:   Future     Standing Expiration Date:   3/12/2022     Scheduling Instructions:      Bring along any outside films relating to this procedure             Order Specific Question:   Reason for Exam:     Answer:   nephrolithiasis

## 2018-03-12 ENCOUNTER — APPOINTMENT (OUTPATIENT)
Dept: RADIOLOGY | Facility: MEDICAL CENTER | Age: 48
End: 2018-03-12
Payer: COMMERCIAL

## 2018-03-12 ENCOUNTER — TRANSCRIBE ORDERS (OUTPATIENT)
Dept: RADIOLOGY | Facility: MEDICAL CENTER | Age: 48
End: 2018-03-12

## 2018-03-12 ENCOUNTER — OFFICE VISIT (OUTPATIENT)
Dept: UROLOGY | Facility: CLINIC | Age: 48
End: 2018-03-12
Payer: COMMERCIAL

## 2018-03-12 VITALS
HEART RATE: 60 BPM | SYSTOLIC BLOOD PRESSURE: 110 MMHG | BODY MASS INDEX: 28.37 KG/M2 | DIASTOLIC BLOOD PRESSURE: 80 MMHG | WEIGHT: 221 LBS

## 2018-03-12 DIAGNOSIS — N20.0 CALCULUS OF KIDNEY: Primary | ICD-10-CM

## 2018-03-12 DIAGNOSIS — N20.0 CALCULUS OF KIDNEY: ICD-10-CM

## 2018-03-12 PROCEDURE — 74018 RADEX ABDOMEN 1 VIEW: CPT

## 2018-03-12 PROCEDURE — 99213 OFFICE O/P EST LOW 20 MIN: CPT | Performed by: PHYSICIAN ASSISTANT

## 2018-03-12 RX ORDER — MECLIZINE HYDROCHLORIDE 25 MG/1
TABLET ORAL
COMMUNITY
Start: 2017-11-13 | End: 2019-06-18 | Stop reason: ALTCHOICE

## 2019-03-20 ENCOUNTER — APPOINTMENT (OUTPATIENT)
Dept: RADIOLOGY | Facility: MEDICAL CENTER | Age: 49
End: 2019-03-20
Payer: COMMERCIAL

## 2019-03-20 DIAGNOSIS — N20.0 CALCULUS OF KIDNEY: ICD-10-CM

## 2019-03-20 PROCEDURE — 74018 RADEX ABDOMEN 1 VIEW: CPT

## 2019-03-25 NOTE — PROGRESS NOTES
3/26/2019      Chief Complaint   Patient presents with    Nephrolithiasis       Assessment and Plan    53 y o  male managed by Dr Bang Emery    1  Nephrolithiasis  - KUB shows a potential ureteral calculi   - given the fact that the patient is asymptomatic it is likely he just has had an increase in the number of phleboliths, however will obtain an ultrasound to confirm, if any questions or concerns can proceed with a CT scan but will hold on this for now given the patient is asymptomatic  - stone analysis revealed a predominantly calcium oxalate nephrolithiasis  - AUA kidney stone handout provided    Obtain U/S now, if no signs of obstructing calculi will FU again in 1 year with KUB and U/S prior       History of Present Illness  Arsenio Van is a 53 y o  male here for follow up evaluation of nephrolithiasis  In December 2017 the patient had a 3 millimeter obstructing stone and underwent ureteroscopy 1/2/2018  Lorene Gibson was successfully removed and stone analysis revealed a predominantly calcium oxalate nephrolithiasis  The patient presents today with a KUB obtained prior to his visit  This revealed a potential ureteral calculi  He does have phleboliths but due to an increase in number this raised suspicion for ureteral calculi  The patient is asymptomatic for any signs of an obstructing calculi  He denies flank pain, nausea, vomiting, fever, chills, or gross hematuria  His lower urine tract symptoms are listed as below  He is doing well today  Review of Systems   Constitutional: Negative for activity change, chills and fever  Gastrointestinal: Negative for abdominal distention and abdominal pain  Musculoskeletal: Negative for back pain and gait problem  Psychiatric/Behavioral: Negative for behavioral problems and confusion  Urinary Incontinence Screening      Most Recent Value   Urinary Incontinence   Urinary Incontinence? No   Incomplete emptying? No   Urinary frequency?   No Urinary urgency? No   Urinary hesitancy? No   Dysuria (painful difficult urination)? No   Nocturia (waking up to use the bathroom)? No   Straining (having to push to go)? No   Weak stream?  No   Intermittent stream?  No   Post void dribbling? No          Past Medical History  Past Medical History:   Diagnosis Date    Kidney stone        Past Social History  Past Surgical History:   Procedure Laterality Date    APPENDECTOMY      KNEE ARTHROSCOPY      x3    [place] CYSTO/URETERO W/LITHOTRIPSY &INDWELL STENT INSRT  12/8/2017    Procedure: CYSTOSCOPY URETEROSCOPY  RETROGRADE PYELOGRAM AND INSERTION STENT URETERAL;  Surgeon: Jacob Umaña MD;  Location: [facility];  Service: Urology    CO CYSTO/URETERO W/LITHOTRIPSY &INDWELL STENT INSRT Right 1/2/2018    Procedure: CYSTOSCOPY, RIGHT RETROGRADE PYELOGRAM, RIGHT URETEROSCOPY WITH  STONE MANIPULATION AND EXTRACTION, RIGHT DOUBLE J STENT EXCHANGE;  Surgeon: Jacob Umaña MD;  Location:  MAIN OR;  Service: Urology    TONSILLECTOMY      WISDOM TOOTH EXTRACTION       Social History     Tobacco Use   Smoking Status Never Smoker   Smokeless Tobacco Never Used       Past Family History  History reviewed  No pertinent family history      Past Social history  Social History     Socioeconomic History    Marital status: /Civil Union     Spouse name: Not on file    Number of children: Not on file    Years of education: Not on file    Highest education level: Not on file   Occupational History    Occupation:    Social Needs    Financial resource strain: Not on file    Food insecurity:     Worry: Not on file     Inability: Not on file    Transportation needs:     Medical: Not on file     Non-medical: Not on file   Tobacco Use    Smoking status: Never Smoker    Smokeless tobacco: Never Used   Substance and Sexual Activity    Alcohol use: No    Drug use: No    Sexual activity: Not on file   Lifestyle    Physical activity:     Days per week: Not on file     Minutes per session: Not on file    Stress: Not on file   Relationships    Social connections:     Talks on phone: Not on file     Gets together: Not on file     Attends Yarsani service: Not on file     Active member of club or organization: Not on file     Attends meetings of clubs or organizations: Not on file     Relationship status: Not on file    Intimate partner violence:     Fear of current or ex partner: Not on file     Emotionally abused: Not on file     Physically abused: Not on file     Forced sexual activity: Not on file   Other Topics Concern    Not on file   Social History Narrative    Not on file       Current Medications  Current Outpatient Medications   Medication Sig Dispense Refill    meclizine (ANTIVERT) 25 mg tablet Take by mouth      tamsulosin (FLOMAX) 0 4 mg Take 1 capsule by mouth daily with dinner (Patient not taking: Reported on 3/26/2019) 30 capsule 0     No current facility-administered medications for this visit  Allergies  No Known Allergies      The following portions of the patient's history were reviewed and updated as appropriate: allergies, current medications, past medical history, past social history, past surgical history and problem list       Vitals  Vitals:    03/26/19 0937   BP: 130/80   Weight: 96 2 kg (212 lb)   Height: 6' 2" (1 88 m)           Physical Exam  Constitutional   General appearance: Patient is seated and in no acute distress, well appearing and well nourished  Head and Face   Head and face: Normal     Eyes   Conjunctiva and lids: No erythema, swelling or discharge  Ears, Nose, Mouth, and Throat   Hearing: Normal     Pulmonary   Respiratory effort: No increased work of breathing or signs of respiratory distress  Cardiovascular   Examination of extremities for edema and/or varicosities: Normal     Abdomen   Abdomen: Non-tender, no masses       Musculoskeletal   Gait and station: Normal     Skin   Skin and subcutaneous tissue: Warm, dry, and intact  No visible lesions or rashes  Psychiatric   Judgment and insight: Normal  Recent and remote memory:  Normal  Mood and affect: Normal      Results  No results found for this or any previous visit (from the past 1 hour(s)) ]  Lab Results   Component Value Date    PSA 1 3 01/08/2016    PSA 1 4 09/08/2015     Lab Results   Component Value Date    GLUCOSE 85 09/08/2015    CALCIUM 9 5 12/20/2017     09/08/2015    K 4 1 12/20/2017    CO2 28 12/20/2017     12/20/2017    BUN 18 12/20/2017    CREATININE 1 13 12/20/2017     Lab Results   Component Value Date    WBC 6 54 12/20/2017    HGB 15 1 12/20/2017    HCT 43 9 12/20/2017    MCV 92 12/20/2017     12/20/2017       ABDOMEN XRAY  INDICATION:   N20 0: Calculus of kidney  COMPARISON:  March 12, 2018  VIEWS:  AP supine  Images: 2     FINDINGS:     On the previous study, there are several phleboliths on the right side  On the current study, there are 8  This raises suspicion of a small stone in the distal ureter  This is similar in size to the offending stone seen on the CT from December 7, 2017  Correlate with findings at previous right-sided retrograde     Nonobstructive bowel gas pattern      No acute osseous abnormality is seen      IMPRESSION:  1  Question interval change in stone burden  Orders  No orders of the defined types were placed in this encounter

## 2019-03-26 ENCOUNTER — OFFICE VISIT (OUTPATIENT)
Dept: UROLOGY | Facility: CLINIC | Age: 49
End: 2019-03-26
Payer: COMMERCIAL

## 2019-03-26 VITALS
DIASTOLIC BLOOD PRESSURE: 80 MMHG | WEIGHT: 212 LBS | HEIGHT: 74 IN | SYSTOLIC BLOOD PRESSURE: 130 MMHG | BODY MASS INDEX: 27.21 KG/M2

## 2019-03-26 DIAGNOSIS — N20.0 CALCULUS OF KIDNEY: Primary | ICD-10-CM

## 2019-03-26 PROCEDURE — 99213 OFFICE O/P EST LOW 20 MIN: CPT | Performed by: PHYSICIAN ASSISTANT

## 2019-03-29 ENCOUNTER — HOSPITAL ENCOUNTER (OUTPATIENT)
Dept: ULTRASOUND IMAGING | Facility: HOSPITAL | Age: 49
Discharge: HOME | End: 2019-03-29
Payer: COMMERCIAL

## 2019-03-29 DIAGNOSIS — N20.0 CALCULUS OF KIDNEY: ICD-10-CM

## 2019-03-29 PROCEDURE — 76770 US EXAM ABDO BACK WALL COMP: CPT

## 2019-04-02 ENCOUNTER — TELEPHONE (OUTPATIENT)
Dept: UROLOGY | Facility: CLINIC | Age: 49
End: 2019-04-02

## 2019-05-28 ENCOUNTER — APPOINTMENT (OUTPATIENT)
Dept: RADIOLOGY | Facility: MEDICAL CENTER | Age: 49
End: 2019-05-28
Payer: COMMERCIAL

## 2019-05-28 DIAGNOSIS — S89.91XA INJURY OF RIGHT KNEE, INITIAL ENCOUNTER: ICD-10-CM

## 2019-05-28 DIAGNOSIS — S89.91XA INJURY OF RIGHT KNEE, INITIAL ENCOUNTER: Primary | ICD-10-CM

## 2019-05-28 PROCEDURE — 73564 X-RAY EXAM KNEE 4 OR MORE: CPT

## 2019-06-03 DIAGNOSIS — S89.91XA INJURY OF RIGHT KNEE, INITIAL ENCOUNTER: Primary | ICD-10-CM

## 2019-06-18 ENCOUNTER — OFFICE VISIT (OUTPATIENT)
Dept: OBGYN CLINIC | Facility: CLINIC | Age: 49
End: 2019-06-18
Payer: COMMERCIAL

## 2019-06-18 VITALS
BODY MASS INDEX: 27.21 KG/M2 | RESPIRATION RATE: 16 BRPM | WEIGHT: 212 LBS | SYSTOLIC BLOOD PRESSURE: 143 MMHG | HEART RATE: 66 BPM | DIASTOLIC BLOOD PRESSURE: 90 MMHG | HEIGHT: 74 IN

## 2019-06-18 DIAGNOSIS — M25.361 KNEE INSTABILITY, RIGHT: ICD-10-CM

## 2019-06-18 DIAGNOSIS — S89.91XA KNEE INJURY, RIGHT, INITIAL ENCOUNTER: Primary | ICD-10-CM

## 2019-06-18 PROCEDURE — 99243 OFF/OP CNSLTJ NEW/EST LOW 30: CPT | Performed by: FAMILY MEDICINE

## 2019-06-19 ENCOUNTER — HOSPITAL ENCOUNTER (OUTPATIENT)
Dept: MRI IMAGING | Facility: HOSPITAL | Age: 49
Discharge: HOME | End: 2019-06-19
Payer: COMMERCIAL

## 2019-06-19 DIAGNOSIS — S89.91XA KNEE INJURY, RIGHT, INITIAL ENCOUNTER: ICD-10-CM

## 2019-06-19 DIAGNOSIS — M25.361 KNEE INSTABILITY, RIGHT: ICD-10-CM

## 2019-06-19 PROCEDURE — 73721 MRI JNT OF LWR EXTRE W/O DYE: CPT

## 2019-06-21 ENCOUNTER — OFFICE VISIT (OUTPATIENT)
Dept: OBGYN CLINIC | Facility: CLINIC | Age: 49
End: 2019-06-21
Payer: COMMERCIAL

## 2019-06-21 VITALS
SYSTOLIC BLOOD PRESSURE: 130 MMHG | BODY MASS INDEX: 28.11 KG/M2 | DIASTOLIC BLOOD PRESSURE: 84 MMHG | HEIGHT: 74 IN | WEIGHT: 219 LBS | RESPIRATION RATE: 16 BRPM | HEART RATE: 58 BPM

## 2019-06-21 DIAGNOSIS — S83.241D ACUTE MEDIAL MENISCUS TEAR OF RIGHT KNEE, SUBSEQUENT ENCOUNTER: Primary | ICD-10-CM

## 2019-06-21 PROCEDURE — 99213 OFFICE O/P EST LOW 20 MIN: CPT | Performed by: FAMILY MEDICINE

## 2019-06-26 ENCOUNTER — APPOINTMENT (OUTPATIENT)
Dept: RADIOLOGY | Facility: OTHER | Age: 49
End: 2019-06-26
Payer: COMMERCIAL

## 2019-06-26 ENCOUNTER — OFFICE VISIT (OUTPATIENT)
Dept: OBGYN CLINIC | Facility: OTHER | Age: 49
End: 2019-06-26
Payer: COMMERCIAL

## 2019-06-26 VITALS
WEIGHT: 224.8 LBS | HEIGHT: 74 IN | HEART RATE: 49 BPM | DIASTOLIC BLOOD PRESSURE: 90 MMHG | SYSTOLIC BLOOD PRESSURE: 136 MMHG | BODY MASS INDEX: 28.85 KG/M2

## 2019-06-26 DIAGNOSIS — M17.11 PRIMARY OSTEOARTHRITIS OF RIGHT KNEE: ICD-10-CM

## 2019-06-26 DIAGNOSIS — Z01.89 ENCOUNTER FOR LOWER EXTREMITY COMPARISON IMAGING STUDY: ICD-10-CM

## 2019-06-26 DIAGNOSIS — S83.241A ACUTE MEDIAL MENISCUS TEAR OF RIGHT KNEE, INITIAL ENCOUNTER: ICD-10-CM

## 2019-06-26 DIAGNOSIS — Z01.89 ENCOUNTER FOR LOWER EXTREMITY COMPARISON IMAGING STUDY: Primary | ICD-10-CM

## 2019-06-26 DIAGNOSIS — S83.241A ACUTE MEDIAL MENISCUS TEAR OF RIGHT KNEE, INITIAL ENCOUNTER: Primary | ICD-10-CM

## 2019-06-26 PROCEDURE — 99213 OFFICE O/P EST LOW 20 MIN: CPT | Performed by: ORTHOPAEDIC SURGERY

## 2019-06-26 PROCEDURE — 73564 X-RAY EXAM KNEE 4 OR MORE: CPT

## 2019-06-26 PROCEDURE — 73562 X-RAY EXAM OF KNEE 3: CPT

## 2019-06-26 RX ORDER — CEFAZOLIN SODIUM 2 G/50ML
2000 SOLUTION INTRAVENOUS ONCE
Status: CANCELLED | OUTPATIENT
Start: 2019-07-01 | End: 2019-06-26

## 2019-06-26 NOTE — H&P (VIEW-ONLY)
Orthopaedic Surgery - Office Note  Stevenson Shepard (39 y o  male)   : 1970   MRN: [id number]  Encounter Date: 2019    Chief Complaint   Patient presents with    Right Knee - Pain     1  Encounter for lower extremity comparison imaging study  XR knee 3 vw left non injury   2  Acute medial meniscus tear of right knee, initial encounter  Ambulatory referral to Orthopedic Surgery    XR knee 4+ vw right injury    XR knee 4+ vw right injury   3  Primary osteoarthritis of right knee  XR knee 4+ vw right injury         Assessment / Plan  Right knee medial meniscus tear: Suzanne Baxter upon examination and the review of the MRI of the right knee demonstrates a horizontal tear of the medial meniscus  X-rays taken today demonstrate mild narrowing of the medial compartment but the joint space is well maintained  I do feel it is reasonable to consider a right knee arthroscopy with medial meniscectomy  I did discuss with him that a corticosteriod injection is unpredictable to whether or not the injection will help prior to his vacation  · Right knee arthroscopy with medial partial meniscectomy versus meniscal repair was discussed treatment today as well as its associated recovery, and risks including bleeding, infection, nerve injury, blood clots, increased painful symptoms, failure surgery, recurrent injury, and need for further surgery  · The diagnosis and treatment options were reviewed  · The patient wishes to proceed with a right knee arthroscopy with medial meniscectomy  · The risks, benefits, and alternatives were discussed  · Written consent was obtained  Return for Will schedule her surgery on 2019  History of Present Illness  Stevenson Shepard is a 51 y o  male who presents for initial evaluation of his right knee  He states that he has been experiencing intermittent and moderate to severe sharp pain about the anterior medial aspect of his knee over the last 4 half weeks    He denies a specific traumatic event that has resulted in his subsequent pain  He does note that he did begin running approximately 5-6 weeks ago, then switch to use of a recumbent bike  He states that after the use of the recumbent bike he had moderate to severe pain about the medial aspect of his knee  He is not sure if he had the seat positioned correctly resulting in hyperflexion or hyperextension of the knee  He did not appreciate any significant swelling about the knee  He does not appreciate significant mechanical symptoms such as clicking or locking into his knee  He notes that weight-bearing activities as well as pivoting onto his knee exacerbates painful symptoms  He notes this pain is better while at rest   He does have a history of a bucket-handle tear into this knee from 1986 as well as a meniscectomy approximately 6 years ago  Today he denies any distal paresthesias  Review of Systems  Pertinent items are noted in HPI  All other systems were reviewed and are negative  Physical Exam  /90   Pulse (!) 49   Ht 6' 2" (1 88 m)   Wt 102 kg (224 lb 12 8 oz)   BMI 28 86 kg/m²   Cons: Appears well  No apparent distress  Psych: Alert  Oriented x3  Mood and affect normal   Eyes: PERRLA, EOMI  Resp: Normal effort  No audible wheezing or stridor  CV: Palpable pulse  No discernable arrhythmia  No LE edema  Lymph:  No palpable cervical, axillary, or inguinal lymphadenopathy  Skin: Warm  No palpable masses  No visible lesions  Neuro: Normal muscle tone  Normal and symmetric DTR's  Right Knee Exam  Alignment:  Moderate genu varus  Inspection:  Mild anterior medial swelling  No edema  No erythema  No ecchymosis  No muscle atrophy  No deformity  Palpation:  Moderate tenderness at Medial joint line  ROM:  Knee flexion:  120°, knee extension:  0°  Strength:  5/5 quadriceps and hamstrings  Stability:  No objective knee instability  Stable Varus / Valgus stress, Lachman, and Posterior drawer    Tests: (+) Zeina  Patella:  Patella tracks centrally with crepitus  Neurovascular:  Sensation intact to light touch in all other peripheral nerve distributions  2+ DP & PT pulses  Gait:  Not tested  Studies Reviewed  XR of right knee - Demonstrates mild MRI will joint line narrowing with osteophyte formation at the medial tibial plateau and medial femoral condyle  MRI of right knee - Demonstrates a horizontal tear at the anterior horn extending into the body of the medial meniscus  Tear does appear to extend to the posterior horn partial thickness loss the articular cartilage at the medial femoral condyle and trochlear groove    Procedures  No procedures today  Medical, Surgical, Family, and Social History  The patient's medical history, family history, and social history, were reviewed and updated as appropriate      Past Medical History:   Diagnosis Date    Kidney stone        Past Surgical History:   Procedure Laterality Date    APPENDECTOMY      KNEE ARTHROSCOPY      x3    [place] CYSTO/URETERO W/LITHOTRIPSY &INDWELL STENT INSRT  12/8/2017    Procedure: CYSTOSCOPY URETEROSCOPY  RETROGRADE PYELOGRAM AND INSERTION STENT URETERAL;  Surgeon: Sandip Barahona MD;  Location[facility];  Service: Urology    [place] CYSTO/URETERO W/LITHOTRIPSY &INDWELL STENT INSRT Right 1/2/2018    Procedure: CYSTOSCOPY, RIGHT RETROGRADE PYELOGRAM, RIGHT URETEROSCOPY WITH  STONE MANIPULATION AND EXTRACTION, RIGHT DOUBLE J STENT EXCHANGE;  Surgeon: Sandip Barahona MD;  Location:  MAIN OR;  Service: Urology    TONSILLECTOMY      WISDOM TOOTH EXTRACTION         Family History   Problem Relation Age of Onset    No Known Problems Mother     Esophageal cancer Father     Throat cancer Father     Prostate cancer Father     No Known Problems Sister        Social History     Occupational History    Occupation:    Tobacco Use    Smoking status: Never Smoker    Smokeless tobacco: Never Used   Substance and Sexual Activity    Alcohol use: No    Drug use: No    Sexual activity: Not on file       No Known Allergies    No current outpatient medications on file        Placido Henderson    Scribe Attestation    I,:   Placido Henderson am acting as a scribe while in the presence of the attending physician :        I,:   Mary Ellen Geller MD personally performed the services described in this documentation    as scribed in my presence :

## 2019-06-27 RX ORDER — CEFAZOLIN SODIUM 2 G/50ML
2000 SOLUTION INTRAVENOUS ONCE
Status: CANCELLED | OUTPATIENT
Start: 2019-07-01 | End: 2019-06-27

## 2019-06-28 ENCOUNTER — ANESTHESIA EVENT (OUTPATIENT)
Dept: PERIOP | Facility: HOSPITAL | Age: 49
End: 2019-06-28
Payer: COMMERCIAL

## 2019-06-28 RX ORDER — IBUPROFEN 600 MG/1
TABLET ORAL EVERY 6 HOURS PRN
COMMUNITY
End: 2019-07-01 | Stop reason: HOSPADM

## 2019-06-28 NOTE — PRE-PROCEDURE INSTRUCTIONS
Pre-Surgery Instructions:   Medication Instructions    Aspirin-Acetaminophen-Caffeine (EXCEDRIN PO) Instructed patient per Anesthesia Guidelines   ibuprofen (MOTRIN) 600 mg tablet Instructed patient per Anesthesia Guidelines  Per anesthesia patient was told to stop vitamins and NSAIDS as of today-- he states he hasn't taken for over a week  No medications are needed on morning of surgery

## 2019-07-01 ENCOUNTER — HOSPITAL ENCOUNTER (OUTPATIENT)
Facility: HOSPITAL | Age: 49
Setting detail: OUTPATIENT SURGERY
Discharge: HOME | End: 2019-07-01
Attending: ORTHOPAEDIC SURGERY | Admitting: ORTHOPAEDIC SURGERY
Payer: COMMERCIAL

## 2019-07-01 ENCOUNTER — ANESTHESIA (OUTPATIENT)
Dept: PERIOP | Facility: HOSPITAL | Age: 49
End: 2019-07-01
Payer: COMMERCIAL

## 2019-07-01 VITALS
TEMPERATURE: 97.9 F | WEIGHT: 225 LBS | OXYGEN SATURATION: 97 % | RESPIRATION RATE: 18 BRPM | SYSTOLIC BLOOD PRESSURE: 132 MMHG | BODY MASS INDEX: 28.88 KG/M2 | HEIGHT: 74 IN | HEART RATE: 62 BPM | DIASTOLIC BLOOD PRESSURE: 77 MMHG

## 2019-07-01 DIAGNOSIS — S83.241A ACUTE MEDIAL MENISCUS TEAR OF RIGHT KNEE, INITIAL ENCOUNTER: Primary | ICD-10-CM

## 2019-07-01 PROCEDURE — 29881 ARTHRS KNE SRG MNISECTMY M/L: CPT | Performed by: ORTHOPAEDIC SURGERY

## 2019-07-01 RX ORDER — CEFAZOLIN SODIUM 2 G/50ML
2000 SOLUTION INTRAVENOUS ONCE
Status: DISCONTINUED | OUTPATIENT
Start: 2019-07-01 | End: 2019-07-01 | Stop reason: HOSPADM

## 2019-07-01 RX ORDER — ONDANSETRON 2 MG/ML
INJECTION INTRAMUSCULAR; INTRAVENOUS AS NEEDED
Status: DISCONTINUED | OUTPATIENT
Start: 2019-07-01 | End: 2019-07-01 | Stop reason: SURG

## 2019-07-01 RX ORDER — FENTANYL CITRATE 50 UG/ML
INJECTION, SOLUTION INTRAMUSCULAR; INTRAVENOUS AS NEEDED
Status: DISCONTINUED | OUTPATIENT
Start: 2019-07-01 | End: 2019-07-01 | Stop reason: SURG

## 2019-07-01 RX ORDER — ONDANSETRON 4 MG/1
4 TABLET, ORALLY DISINTEGRATING ORAL EVERY 8 HOURS PRN
Qty: 20 TABLET | Refills: 0 | Status: SHIPPED | OUTPATIENT
Start: 2019-07-01 | End: 2020-09-02 | Stop reason: ALTCHOICE

## 2019-07-01 RX ORDER — MIDAZOLAM HYDROCHLORIDE 1 MG/ML
INJECTION INTRAMUSCULAR; INTRAVENOUS AS NEEDED
Status: DISCONTINUED | OUTPATIENT
Start: 2019-07-01 | End: 2019-07-01 | Stop reason: SURG

## 2019-07-01 RX ORDER — PROPOFOL 10 MG/ML
INJECTION, EMULSION INTRAVENOUS AS NEEDED
Status: DISCONTINUED | OUTPATIENT
Start: 2019-07-01 | End: 2019-07-01 | Stop reason: SURG

## 2019-07-01 RX ORDER — FENTANYL CITRATE/PF 50 MCG/ML
25 SYRINGE (ML) INJECTION
Status: DISCONTINUED | OUTPATIENT
Start: 2019-07-01 | End: 2019-07-01 | Stop reason: HOSPADM

## 2019-07-01 RX ORDER — NAPROXEN 500 MG/1
500 TABLET ORAL 2 TIMES DAILY WITH MEALS
Qty: 60 TABLET | Refills: 0 | Status: SHIPPED | OUTPATIENT
Start: 2019-07-01 | End: 2021-09-24

## 2019-07-01 RX ORDER — ROPIVACAINE HYDROCHLORIDE 5 MG/ML
INJECTION, SOLUTION EPIDURAL; INFILTRATION; PERINEURAL AS NEEDED
Status: DISCONTINUED | OUTPATIENT
Start: 2019-07-01 | End: 2019-07-01 | Stop reason: SURG

## 2019-07-01 RX ORDER — CEFAZOLIN SODIUM 2 G/50ML
2000 SOLUTION INTRAVENOUS ONCE
Status: COMPLETED | OUTPATIENT
Start: 2019-07-01 | End: 2019-07-01

## 2019-07-01 RX ORDER — MORPHINE SULFATE 10 MG/ML
2 INJECTION, SOLUTION INTRAMUSCULAR; INTRAVENOUS EVERY 4 HOURS PRN
Status: DISCONTINUED | OUTPATIENT
Start: 2019-07-01 | End: 2019-07-01 | Stop reason: HOSPADM

## 2019-07-01 RX ORDER — SODIUM CHLORIDE 9 MG/ML
125 INJECTION, SOLUTION INTRAVENOUS CONTINUOUS
Status: DISCONTINUED | OUTPATIENT
Start: 2019-07-01 | End: 2019-07-01 | Stop reason: HOSPADM

## 2019-07-01 RX ORDER — OXYCODONE HYDROCHLORIDE 5 MG/1
5 TABLET ORAL EVERY 4 HOURS PRN
Qty: 30 TABLET | Refills: 0 | Status: SHIPPED | OUTPATIENT
Start: 2019-07-01 | End: 2019-07-11

## 2019-07-01 RX ORDER — ONDANSETRON 2 MG/ML
4 INJECTION INTRAMUSCULAR; INTRAVENOUS ONCE AS NEEDED
Status: DISCONTINUED | OUTPATIENT
Start: 2019-07-01 | End: 2019-07-01 | Stop reason: HOSPADM

## 2019-07-01 RX ORDER — LIDOCAINE HYDROCHLORIDE 20 MG/ML
INJECTION, SOLUTION EPIDURAL; INFILTRATION; INTRACAUDAL; PERINEURAL AS NEEDED
Status: DISCONTINUED | OUTPATIENT
Start: 2019-07-01 | End: 2019-07-01 | Stop reason: SURG

## 2019-07-01 RX ORDER — OXYCODONE HYDROCHLORIDE AND ACETAMINOPHEN 5; 325 MG/1; MG/1
2 TABLET ORAL EVERY 4 HOURS PRN
Status: DISCONTINUED | OUTPATIENT
Start: 2019-07-01 | End: 2019-07-01 | Stop reason: HOSPADM

## 2019-07-01 RX ORDER — OXYCODONE HYDROCHLORIDE AND ACETAMINOPHEN 5; 325 MG/1; MG/1
1 TABLET ORAL EVERY 4 HOURS PRN
Status: DISCONTINUED | OUTPATIENT
Start: 2019-07-01 | End: 2019-07-01 | Stop reason: HOSPADM

## 2019-07-01 RX ADMIN — CEFAZOLIN SODIUM 2000 MG: 2 SOLUTION INTRAVENOUS at 10:25

## 2019-07-01 RX ADMIN — MIDAZOLAM 4 MG: 1 INJECTION INTRAMUSCULAR; INTRAVENOUS at 09:22

## 2019-07-01 RX ADMIN — OXYCODONE HYDROCHLORIDE AND ACETAMINOPHEN 1 TABLET: 5; 325 TABLET ORAL at 12:23

## 2019-07-01 RX ADMIN — ONDANSETRON HYDROCHLORIDE 4 MG: 2 INJECTION, SOLUTION INTRAVENOUS at 11:16

## 2019-07-01 RX ADMIN — SODIUM CHLORIDE: 0.9 INJECTION, SOLUTION INTRAVENOUS at 10:25

## 2019-07-01 RX ADMIN — FENTANYL CITRATE 50 MCG: 50 INJECTION, SOLUTION INTRAMUSCULAR; INTRAVENOUS at 10:33

## 2019-07-01 RX ADMIN — ROPIVACAINE HYDROCHLORIDE 30 ML: 5 INJECTION, SOLUTION EPIDURAL; INFILTRATION; PERINEURAL at 09:25

## 2019-07-01 RX ADMIN — SODIUM CHLORIDE 125 ML/HR: 0.9 INJECTION, SOLUTION INTRAVENOUS at 08:14

## 2019-07-01 RX ADMIN — LIDOCAINE HYDROCHLORIDE 100 MG: 20 INJECTION, SOLUTION EPIDURAL; INFILTRATION; INTRACAUDAL; PERINEURAL at 10:19

## 2019-07-01 RX ADMIN — FENTANYL CITRATE 100 MCG: 50 INJECTION, SOLUTION INTRAMUSCULAR; INTRAVENOUS at 09:22

## 2019-07-01 RX ADMIN — PROPOFOL 50 MG: 10 INJECTION, EMULSION INTRAVENOUS at 10:20

## 2019-07-01 RX ADMIN — PROPOFOL 200 MG: 10 INJECTION, EMULSION INTRAVENOUS at 10:21

## 2019-07-01 NOTE — INTERVAL H&P NOTE
H&P reviewed  After examining the patient I find no changes in the patients condition since the H&P had been written    /72   Pulse (!) 54   Temp (!) 96 4 °F (35 8 °C) (Tympanic)   Resp 16   Ht 6' 2" (1 88 m)   Wt 102 kg (225 lb)   SpO2 98%   BMI 28 89 kg/m²

## 2019-07-01 NOTE — ANESTHESIA PREPROCEDURE EVALUATION
Review of Systems/Medical History  Patient summary reviewed  Chart reviewed  No history of anesthetic complications     Cardiovascular  Hyperlipidemia (No meds), Hypertension controlled,   Comment: 12/8/2017: LEFT VENTRICLE:  Systolic function was normal  Ejection fraction was estimated to be 60 %  There were no regional wall motion abnormalities  ,  Pulmonary  Negative pulmonary ROS        GI/Hepatic  Negative GI/hepatic ROS          Kidney stones,   Comment: Hx of YVONNE     Endo/Other     GYN       Hematology  Negative hematology ROS      Musculoskeletal  Back pain , cervical pain and lumbar pain,   Arthritis     Neurology    Headaches,    Psychology   Negative psychology ROS              Physical Exam    Airway    Mallampati score: II  TM Distance: <3 FB  Neck ROM: full     Dental   Comment: 3 Temporaries in front top, No notable dental hx     Cardiovascular  Rhythm: regular, Rate: normal, Cardiovascular exam normal    Pulmonary  Breath sounds clear to auscultation,     Other Findings        Anesthesia Plan  ASA Score- 2     Anesthesia Type- general with ASA Monitors  Additional Monitors:   Airway Plan: LMA  Comment: Intraarticular block preop discussed        Plan Factors-Patient not instructed to abstain from smoking on day of procedure  Patient did not smoke on day of surgery  Induction- intravenous  Postoperative Plan-     Informed Consent- Anesthetic plan and risks discussed with patient and spouse

## 2019-07-01 NOTE — OP NOTE
OPERATIVE REPORT    PATIENT NAME: Maureen Hayes   :  1970  MRN: [id number]  Pt Location: [facility] 02    SURGERY DATE: 2019    SURGEON(S) and ROLE:  Primary: Lita Taylor MD  Assisting: Terrill Gaucher, MD    NOTE:  I was present for the entire procedure and performed all essential portions of the surgery  PREOPERATIVE DIAGNOSES:  Right Knee  Medial Meniscus Tear    POSTOPERATIVE DIAGNOSES:  Right Knee  Medial Meniscus Tear    PROCEDURES:  Right Knee Arthroscopy with:  Partial Medial Meniscectomy      ANESTHESIA TYPE:  General LMA and Intra-articular block    ANESTHESIA STAFF:   Anesthesiologist: Dom Singer DO  CRNA: Treasure Beyer CRNA    ESTIMATED BLOOD LOSS:  2 mL    TOURNIQUET TIME:  Not used    PERIOPERATIVE ANTIBIOTICS:  cefazolin, 1 gram    IMPLANTS:  none    * No implants in log *    SPECIMENS:  * No specimens in log *    DRAINS:  None      OPERATIVE INDICATIONS:  The patient is a 51 y o  male with right knee pain and recurrent medial meniscus tear  He had a history of two prior partial medial meniscectomies  Surgical treatment was indicated due to persistent symptoms despite non-surgical treatment  After a thorough discussion of the potential risks, benefits, and alternative treatments, the patient agreed to proceed with surgery  The patient understands that the risks of surgery include, but are not limited to: infection, neurovascular injury, wound healing complications, venous thromboembolism, persistent pain, stiffness, instability, recurrence of symptoms, potential need for additional surgeries, and loss of limb or life  Oral and written consent for surgery was obtained from the patient preoperatively  EXAM UNDER ANESTHESIA:  Neutral alignment  ROM:  0-135 degrees  Ligaments stable to varus stress / valgus stress / Lachman / posterior drawer; negative pivot-shift  Patella tracking normal  without crepitus        PROCEDURE AND TECHNIQUE:  On the day of surgery, the patient was identified in the preoperative holding area  The operative site was marked by the surgeon  The patient was taken into the operating room  A time-out was conducted to confirm the patient's identity, the operative site, and the proposed procedure  The patient was anesthetized, and perioperative antibiotics were administered  The patient was positioned supine on the OR table  All bony prominences were padded  A tourniquet was not used  The operative site was prepped and draped using standard sterile technique  An anterolateral portal was established, and the arthroscope was inserted into the knee joint  An anteromedial portal was established under direct visualization, and diagnostic arthroscopy was performed  The joint demonstrated mild synovitis  In the patellofemoral compartment, the trochlea demonstrated pristine articular cartilage  The patella demonstrated pristine articular cartilage  The patella tracking was normal        In the medial compartment, the medial femoral condyle demonstrated focal grade 2 chondral wear in the medial aspect which  was treated with chondroplasty to remove all loose flaps of tissue   The medial tibial plateau demonstrated diffuse grade 1 chondral wear which required no treatment   The medial meniscus had a complex tear involving the posterior horn and body  There was a flipped fragment in the body  The posterior portion was longitudinal   The torn portion of the meniscus was removed and debrided to a stable base with a basket and motorized shaver  20% of the meniscus width remained intact  In the lateral compartment, the lateral femoral condyle demonstrated pristine articular cartilage  The lateral tibial plateau demonstrated pristine articular cartilage  The lateral meniscus was intact       In the intercondylar notch, the PCL was intact  The ACL was intact  At the conclusion of the procedure, the instruments were withdrawn    The portals and incisions were closed with absorbable sutures and steri-strips  A sterile dressing was applied  The surgical drapes were removed  A soft bandage was applied to the operative knee  The patient was awakened from anesthesia and transported to the PACU in stable condition        COMPLICATIONS:  None    PATIENT DISPOSITION:  PACU       SIGNATURE:  Lita Taylor MD  DATE:  July 1, 2019  TIME:  11:11 AM

## 2019-07-01 NOTE — ANESTHESIA PROCEDURE NOTES
Peripheral Block    Patient location during procedure: holding area  Start time: 7/1/2019 9:22 AM  Reason for block: post-op pain management  Staffing  Anesthesiologist: Cornell Cortes, DO  Performed: anesthesiologist   Preanesthetic Checklist  Completed: patient identified, site marked, surgical consent, pre-op evaluation, timeout performed, IV checked, risks and benefits discussed and monitors and equipment checked  Peripheral Block  Patient position: supine  Prep: ChloraPrep  Patient monitoring: continuous pulse ox, frequent blood pressure checks and heart rate  Block type: Intra-articular  Laterality: right  Injection technique: single-shot  Needle  Needle gauge: 18 g    Needle localization: anatomical landmarks  Assessment  Injection assessment: incremental injection, negative aspiration for heme and no paresthesia on injection  Paresthesia pain: none  Heart rate change: no  Slow fractionated injection: yes  Post-procedure:  site cleaned  patient tolerated the procedure well with no immediate complications

## 2019-07-01 NOTE — DISCHARGE INSTRUCTIONS
POSTOPERATIVE INSTRUCTIONS following KNEE SURGERY    MEDICATIONS:  · Resume all home medications unless otherwise instructed by your surgeon  · Pain Medication:  Oxycodone 5 mg, 1-3 tablets every 3 hours as needed  · If you were given a regional anesthetic (nerve block), please begin taking the pain medication as soon as you get home, even if you have minimal or no pain  DO NOT WAIT FOR THE NERVE BLOCK TO WEAR OFF  · Possible side effects include nausea, constipation, and urinary retention  If you experience these side effects, please call our office for assistance  · Pain med refills are authorized only during office hours (8am-4pm, Mon-Fri)  · Anti-Inflammatory:  Naproxen 500 mg, 1 tablet every 12 hours for 4 weeks  · Take with food  Stop if you experience nausea, reflux, or stomach pain  · Nausea Medication:  Zofran ODT 4 mg, 1 tablet every 6 hours as needed for nausea  · Fill prescription ONLY if you expericnce severe nausea  · Blood Clot Prevention:  Not Necessary  · Pump your foot up and down 20 times per hour while you are less mobile  WOUND CARE:  · Keep the dressing clean and dry  Light drainage may occur the first 2 days postop  · You may remove the dressings and get the incision wet in the shower 72 hours after surgery  DO NOT remove steri-strips or sutures  DO NOT immerse the incision under water  Carefully pat the incision dry  If there is wound drainage, re-apply a fresh dry gauze dressing  · Please call our office ([phone]) if you experience either of the following:  · Sudden increase in swelling, redness, or warmth at the surgical site  · Excessive incisional drainage that persists beyond the 3rd day after surgery  · Oral temperature greater than 101 degrees, not relieved with Tylenol  · Shortness of breath, chest pain, nausea, or any other concerning symptoms    SWELLING CONTROL:  · Cold Therapy:   The cold therapy device may be used either continuously or only as needed, according to your preference  Do not let the pad directly touch your skin  Alternatively, apply ice (20 min on, 20 min off) as often as you feel is necessary  · Elevation:  Elevate the entire leg above heart level  Place pillows under your ankle to keep your knee straight  · Compression:  Apply ACE wraps or a thigh-length compression stocking as needed  RANGE OF MOTION:  · You are allowed FULL RANGE OF MOTION as tolerated  IMMOBILIZATION:  · None  You are allowed full range of motion as tolerated  ACTIVITY:   · BEAR FULL WEIGHT AS TOLERATED on the operative leg  Use crutches to assist only as needed  · Using Crutches on Stairs:  Going up, lead with your "good" (nonoperative) leg  Going down, lead with your "bad" (operative) leg  Use a hand rail when available  · Knee Extension:  Place a rolled towel or pillow under your ankle for 20-30 minutes 3-5 times per day  This will help to maintain full knee extension  · Quad Sets:  Sit or lie with your knee straight  Tighten your quadriceps (front thigh) muscle  Hold for 3 seconds, then relax  Repeat 20 times per hour while awake  PHYSICAL THERAPY:  · You will be given a physical therapy prescription when you are seen in the office for your postoperative appointment  FOLLOW-UP APPOINTMENT:  · 4-5 days after surgery with:    Dr Radames Villa, [address] [facility]  [address] [place] [address], [address] [place] [address], [address] [facility]  [phone] ([facility])  [phone] (After-Hours)

## 2019-07-09 ENCOUNTER — OFFICE VISIT (OUTPATIENT)
Dept: OBGYN CLINIC | Facility: MEDICAL CENTER | Age: 49
End: 2019-07-09

## 2019-07-09 ENCOUNTER — EVALUATION (OUTPATIENT)
Dept: PHYSICAL THERAPY | Facility: CLINIC | Age: 49
End: 2019-07-09
Payer: COMMERCIAL

## 2019-07-09 VITALS
HEART RATE: 72 BPM | BODY MASS INDEX: 29 KG/M2 | HEIGHT: 74 IN | WEIGHT: 226 LBS | DIASTOLIC BLOOD PRESSURE: 86 MMHG | SYSTOLIC BLOOD PRESSURE: 131 MMHG

## 2019-07-09 DIAGNOSIS — S83.241A ACUTE MEDIAL MENISCUS TEAR OF RIGHT KNEE, INITIAL ENCOUNTER: Primary | ICD-10-CM

## 2019-07-09 PROCEDURE — 99024 POSTOP FOLLOW-UP VISIT: CPT | Performed by: ORTHOPAEDIC SURGERY

## 2019-07-09 PROCEDURE — 97140 MANUAL THERAPY 1/> REGIONS: CPT | Performed by: PHYSICAL THERAPIST

## 2019-07-09 PROCEDURE — 97110 THERAPEUTIC EXERCISES: CPT | Performed by: PHYSICAL THERAPIST

## 2019-07-09 PROCEDURE — 97535 SELF CARE MNGMENT TRAINING: CPT | Performed by: PHYSICAL THERAPIST

## 2019-07-09 PROCEDURE — 97161 PT EVAL LOW COMPLEX 20 MIN: CPT | Performed by: PHYSICAL THERAPIST

## 2019-07-09 RX ORDER — AMOXICILLIN 500 MG/1
CAPSULE ORAL
Refills: 0 | COMMUNITY
Start: 2019-04-05 | End: 2020-09-02 | Stop reason: ALTCHOICE

## 2019-07-09 NOTE — PROGRESS NOTES
Orthopaedic Surgery - Office Note  Nelson Veliz (20 y o  male)   : 1970   MRN: [id number]  Encounter Date: 2019    Chief Complaint   Patient presents with    Right Knee - Post-op       Assessment / Plan  Status post right knee arthroscopy with partial medial meniscectomy on 2019    · Continue with ice and analgesics as needed  · Continue to progress activity as tolerated at this time  · Start physical therapy following partial meniscectomy protocol which was provided to the patient at today's visit  We did discuss that he could progress from the outpatient physical therapy to home exercise program as tolerated  Return in about 6 weeks (around 2019)  History of Present Illness  Nelson Veliz is a 51 y o  male who presents for follow-up status post right knee arthroscopy with partial medial meniscectomy on 2019  Patient is doing well at this time  He has been using the game-ready regularly and is scheduled to start outpatient physical therapy later today  He has been taking the naproxen regularly up to this point and feels that the pain he was having prior to surgery is much improved  He still has stiffness to go along with the swelling but otherwise feels he is progressing well  Review of Systems  Pertinent items are noted in HPI  All other systems were reviewed and are negative  Physical Exam  /86   Pulse 72   Ht 6' 2" (1 88 m)   Wt 103 kg (226 lb)   BMI 29 02 kg/m²   Cons: Appears well  No apparent distress  Psych: Alert  Oriented x3  Mood and affect normal   Eyes: PERRLA, EOMI  Resp: Normal effort  No audible wheezing or stridor  CV: Palpable pulse  No discernable arrhythmia  No LE edema  Lymph:  No palpable cervical, axillary, or inguinal lymphadenopathy  Skin: Warm  No palpable masses  No visible lesions  Neuro: Normal muscle tone  Normal and symmetric DTR's  Right Knee Exam  Alignment:  Normal knee alignment    Inspection:  Mild as expected swelling  No erythema  No ecchymosis  No deformity  Incisions clean and dry  Palpation:  Mild tenderness at [place]-incisional areas only  ROM:  Knee Extension 0°  Knee Flexion 120°  Strength:  Able to SLR without lag  Stability:  No objective knee instability  Stable Varus / Valgus stress, Lachman, and Posterior drawer  Tests:  No pertinent positive or negative tests  Patella:  Patella tracks centrally without crepitus  Neurovascular:  Sensation intact in DP/SP/Saab/Sa/T nerve distributions  Sensation intact in all digital nerve distributions  Toes warm and perfused  Gait:  Antalgic  Studies Reviewed  No studies to review    Procedures  No procedures today  Medical, Surgical, Family, and Social History  The patient's medical history, family history, and social history, were reviewed and updated as appropriate      Past Medical History:   Diagnosis Date    Arthritis     Knees, back    Back pain     cervical,lumbar    DDD (degenerative disc disease), cervical     DDD (degenerative disc disease), lumbar     [place] (hard of hearing)     Slightly    Hyperlipidemia     Previously was on medication but doing better now    Kidney stone     Knee pain, right     R knee scope today 7/1/2019    Migraines     Nocturia        Past Surgical History:   Procedure Laterality Date    APPENDECTOMY      ARTHROSCOPY KNEE Right 7/1/2019    Procedure: ARTHROSCOPY KNEE WITH PARTIAL MEDIAL MENISECTOMY;  Surgeon: Wilian Randall MD;  Location[facility];  Service: Orthopedics    CYSTOSCOPY      DENTAL SURGERY      Extractions x2    KNEE ARTHROSCOPY Bilateral     x2 on right; x1 on left    LASIK      [place] CYSTO/URETERO W/LITHOTRIPSY &INDWELL STENT INSRT  12/8/2017    Procedure: CYSTOSCOPY URETEROSCOPY  RETROGRADE PYELOGRAM AND INSERTION STENT URETERAL;  Surgeon: Claudia Carey MD;  Location: [facility];  Service: Urology    [place] CYSTO/URETERO W/LITHOTRIPSY &INDWELL STENT INSRT Right 1/2/2018    Procedure: CYSTOSCOPY, RIGHT RETROGRADE PYELOGRAM, RIGHT URETEROSCOPY WITH  STONE MANIPULATION AND EXTRACTION, RIGHT DOUBLE J STENT EXCHANGE;  Surgeon: Kale Durant MD;  [facility];  Service: Urology    TONSILLECTOMY      VASECTOMY      WISDOM TOOTH EXTRACTION         Family History   Problem Relation Age of Onset    No Known Problems Mother     Esophageal cancer Father     Throat cancer Father     Prostate cancer Father     No Known Problems Sister        Social History     Occupational History    Occupation:    Tobacco Use    Smoking status: Light Tobacco Smoker    Smokeless tobacco: Never Used    Tobacco comment: 3 cigars yearly   Substance and Sexual Activity    Alcohol use: No    Drug use: No    Sexual activity: Not on file       No Known Allergies      Current Outpatient Medications:     amoxicillin (AMOXIL) 500 mg capsule, take 2 capsules by mouth immediately then 1 capsule three times a day until finished, Disp: , Rfl: 0    Aspirin-Acetaminophen-Caffeine (EXCEDRIN PO), Take 1 tablet by mouth as needed, Disp: , Rfl:     naproxen (NAPROSYN) 500 mg tablet, Take 1 tablet (500 mg total) by mouth 2 (two) times a day with meals, Disp: 60 tablet, Rfl: 0    ondansetron (ZOFRAN-ODT) 4 mg disintegrating tablet, Take 1 tablet (4 mg total) by mouth every 8 (eight) hours as needed for nausea or vomiting (Patient not taking: Reported on 7/9/2019), Disp: 20 tablet, Rfl: 0    oxyCODONE (ROXICODONE) 5 mg immediate release tablet, Take 1 tablet (5 mg total) by mouth every 4 (four) hours as needed for moderate pain for up to 10 daysMax Daily Amount: 30 mg (Patient not taking: Reported on 7/9/2019), Disp: 30 tablet, Rfl: 0      Daniel Rocha PA-C    Scribe Attestation    I,:    am acting as a scribe while in the presence of the attending physician :        I,:    personally performed the services described in this documentation    as scribed in my presence :

## 2019-07-09 NOTE — PROGRESS NOTES
PT Evaluation     Today's date: 2019  Patient name: Maureen Hayes  : 1970  MRN: [id number]  Referring provider: Terri Andrew MD  Dx:   Encounter Diagnosis     ICD-10-CM    1  Acute medial meniscus tear of right knee, initial encounter S83 241A Ambulatory referral to Physical Therapy                  Assessment  Understanding of Dx/Px/POC: good   Prognosis: good    Goals  STGs: To be complete within 4 weeks  - Decrease pain to < 2/10 at worst  - Increase AROM to WNL  - Increase strength to > 4+/5  - Improve gait to WNL for distances < 6 blocks    LTGs: To be complete within 6 weeks  - Able to walk for any extended amount of time/distance without pain or limitation for increased safety and functional capacity with ADLs and work-related duty  - Able to repetitively squat without pain or limitation for increased safety and functional capacity with ADLs and work-related duty  - Able to repetitively ascend/descend a full flight of stairs without pain or limitation for increased safety and functional capacity with ADLs and work-related duty  - Able to repetitively complete transfers without pain or limitation for increased safety and functional capacity with ADLs and work-related duty  - Able to kneel for any extended amount of time without pain or limitation for increased safety and functional capacity with ADLs and work-related duty    Plan  Frequency: 2x week  Duration in weeks: 4       Pt is a very pleasant 37 y o  Female with R knee pain who presents with functional deficits including decreased capacity with walking, squatting, kneeling, stairs, and transfers  Upon completion of today's initial evaluation, Patient's dx/sx remain consistent with    Patient will benefit from skilled physical therapy to address current deficits          Subjective Evaluation    Pain  Current pain rating: 3  At best pain ratin  At worst pain ratin       Pt reports he is s/p R Knee medial meniscectomy 7/1/19          Objective Pain level ranges 1-6/10  AROM: R Knee 0-120 degrees; L Knee 0-130 degrees  Strength: R Quadriceps and Hamstrings 3+/5; L Knee WNL  Gait: Mild lateral limp, R knee extension lag  Swelling: Mild (will continue to monitor)  Incisions clean and healing well  Tori's: (-)  PSFS: 5/10  Unable to walk without pain and limitation  Unable to squat without pain and limitation  Unable complete transfers without pain and limitation  Unable to ascend/descend stairs without pain and limitation  Unable to kneel without pain and limitation           Precautions: MD protocol for being s/p R knee medial meniscectomy 7/1/19    Daily Treatment Diary    HEP: Sheet provided and discussed    Manual  7/9/19            ART x15'            IASTM             JM             PROM and LE stretch             STM               Exercise Diary  7/9/19            TM             Bike             NuStep             Standing 1/2 Roll calf stretch 6x20''            SL Ecc HR             HR/TR 3x10            TB TKE 3x10 L5            TB Heel to Toes             Forward/backward heel to toe walk             Sit to stand             No shoe AE [employer] with Add squeeze             SL Bridge             Clam shells             SL Sit to Stand             BOSU SLB             Upside down BOSU SL squat holds             TB Lat Walks             Step ups/downs             HS into QS into SLR 3x10                Modalities  7/9/19            MHP             CP Heel propped x10'            Stim

## 2019-07-11 ENCOUNTER — OFFICE VISIT (OUTPATIENT)
Dept: PHYSICAL THERAPY | Facility: CLINIC | Age: 49
End: 2019-07-11
Payer: COMMERCIAL

## 2019-07-11 DIAGNOSIS — S83.241A ACUTE MEDIAL MENISCUS TEAR OF RIGHT KNEE, INITIAL ENCOUNTER: Primary | ICD-10-CM

## 2019-07-11 PROCEDURE — 97112 NEUROMUSCULAR REEDUCATION: CPT

## 2019-07-11 PROCEDURE — 97010 HOT OR COLD PACKS THERAPY: CPT

## 2019-07-11 PROCEDURE — 97110 THERAPEUTIC EXERCISES: CPT

## 2019-07-11 PROCEDURE — 97140 MANUAL THERAPY 1/> REGIONS: CPT

## 2019-07-11 NOTE — PROGRESS NOTES
Daily Note     Today's date: 2019  Patient name: Ash Aparicio  : 1970  MRN: [id number]  Referring provider: Omer Grijalva MD  Dx:   Encounter Diagnosis     ICD-10-CM    1  Acute medial meniscus tear of right knee, initial encounter S83 628A                   Subjective: Pt reports he is doing well with mild soreness in knee  Objective: See treatment diary below      Assessment: Tolerated treatment well  Patient demonstrated fatigue post treatment and exhibited good technique with therapeutic exercises  Pt able to cont to progress program demonstrating overall improvement in sx and strength  Will cont to benefit from Pt to address deficits  Plan: Continue per plan of care        Precautions: MD protocol for being s/p R knee medial meniscectomy 19    Daily Treatment Diary    HEP: Sheet provided and discussed    Manual  19           ART x15'            IASTM             JM             PROM and LE stretch  15'           STM               Exercise Diary  19           TM  5'           Bike             NuStep             Standing 1/2 Roll calf stretch 6x20'' 6/20"           SL Ecc HR  3/10           HR/TR 3x10 3/10           TB TKE 3x10 L5 3/10 L5           TB Heel to Toes  3/10 L4           Forward/backward heel to toe walk             Sit to stand             No shoe AE [employer]  3/10           Bridge with Add squeeze             SL Bridge             Clam shells             SL Sit to Stand             BOSU SLB  4/25"           Upside down BOSU SL squat holds             TB Lat Walks  L3 3/60'           Step ups/downs  3/10           HS into QS into SLR 3x10                Modalities  19           MHP             CP Heel propped x10' 10'           Stim

## 2019-07-15 ENCOUNTER — OFFICE VISIT (OUTPATIENT)
Dept: PHYSICAL THERAPY | Facility: CLINIC | Age: 49
End: 2019-07-15
Payer: COMMERCIAL

## 2019-07-15 DIAGNOSIS — S83.241A ACUTE MEDIAL MENISCUS TEAR OF RIGHT KNEE, INITIAL ENCOUNTER: Primary | ICD-10-CM

## 2019-07-15 PROCEDURE — 97110 THERAPEUTIC EXERCISES: CPT | Performed by: PHYSICAL THERAPIST

## 2019-07-15 PROCEDURE — 97112 NEUROMUSCULAR REEDUCATION: CPT | Performed by: PHYSICAL THERAPIST

## 2019-07-15 PROCEDURE — 97140 MANUAL THERAPY 1/> REGIONS: CPT | Performed by: PHYSICAL THERAPIST

## 2019-07-15 PROCEDURE — 97530 THERAPEUTIC ACTIVITIES: CPT | Performed by: PHYSICAL THERAPIST

## 2019-07-15 NOTE — PROGRESS NOTES
Daily Note     Today's date: 7/15/2019  Patient name: Alvino Fernandez  : 1970  MRN: [id number]  Referring provider: Alireza Owens MD  Dx:   Encounter Diagnosis     ICD-10-CM    1  Acute medial meniscus tear of right knee, initial encounter S83 050A                   Subjective: Pt reports continued progress, without setbacks  Some overall soreness, but not bad  Anxious to continue making progress  Objective: See treatment diary below      Assessment: Tolerated treatment well  Patient demonstrated fatigue post treatment and exhibited good technique with therapeutic exercises  Pt able to cont to progress program demonstrating overall improvement in sx and strength  Will cont to benefit from skilled PT to address current deficits  Plan: Continue per plan of care  Progress treatment as tolerated         Precautions: MD protocol for being s/p R knee medial meniscectomy 19    Daily Treatment Diary    HEP: Sheet provided and discussed    Manual  7/9/19 7/15/19           ART x15' x15'           IASTM             JM             PROM and LE stretch             STM               Exercise Diary  7/9/19 7/15/19           TM  5'           Bike             NuStep             Standing 1/2 Roll calf stretch 6x20'' 6/20"           SL Ecc HR  3/10           HR/TR 3x10 3/10           TB TKE 3x10 L5 3/10 L5           TB Heel to Toes  3/10 L4           Stool Scoot  4x40'           Sit to stand             No shoe AE [employer]  3/10           Bridge with Add squeeze             SL Bridge  3x10           Clam shells             SL Sit to Stand             BOSU SLB  4/25"           Upside down BOSU SL squat holds             PB Pull ins  3x10           TB Lat Walks  L3 3/60'           Step ups/downs  3/10 14''           HS into QS into SLR 3x10                Modalities  7/9/19 7/15/19           MHP             CP Heel propped x10' 10'           Stim

## 2019-07-18 ENCOUNTER — OFFICE VISIT (OUTPATIENT)
Dept: PHYSICAL THERAPY | Facility: CLINIC | Age: 49
End: 2019-07-18
Payer: COMMERCIAL

## 2019-07-18 DIAGNOSIS — S83.241A ACUTE MEDIAL MENISCUS TEAR OF RIGHT KNEE, INITIAL ENCOUNTER: Primary | ICD-10-CM

## 2019-07-18 PROCEDURE — 97112 NEUROMUSCULAR REEDUCATION: CPT | Performed by: PHYSICAL THERAPIST

## 2019-07-18 PROCEDURE — 97140 MANUAL THERAPY 1/> REGIONS: CPT | Performed by: PHYSICAL THERAPIST

## 2019-07-18 PROCEDURE — 97110 THERAPEUTIC EXERCISES: CPT | Performed by: PHYSICAL THERAPIST

## 2019-07-18 PROCEDURE — 97530 THERAPEUTIC ACTIVITIES: CPT | Performed by: PHYSICAL THERAPIST

## 2019-07-18 NOTE — PROGRESS NOTES
Daily Note     Today's date: 2019  Patient name: Fabian Andrews  : 1970  MRN: [id number]  Referring provider: Chris Granger MD  Dx:   Encounter Diagnosis     ICD-10-CM    1  Acute medial meniscus tear of right knee, initial encounter S83 236A                   Subjective: Pt reports HEP going well  No setbacks  Getting stronger and stronger  Objective: See treatment diary below      Assessment: Tolerated treatment well  Patient demonstrated fatigue post treatment and exhibited good technique with therapeutic exercises  Pt able to cont to progress program demonstrating overall improvement in sx and strength  Will cont to benefit from skilled PT to address current deficits  Plan: Continue per plan of care  Progress treatment as tolerated         Precautions: MD protocol for being s/p R knee medial meniscectomy 19    Daily Treatment Diary    HEP: Sheet provided and discussed    Manual  19           ART x15' x15'           IASTM             JM             PROM and LE stretch             STM               Exercise Diary  19           TM  5'           Bike             NuStep             Standing 1/2 Roll calf stretch 6x20'' 20"           SL Ecc HR  3/10           HR/TR 3x10 3/10           TB TKE 3x10 L5 3/10 L5           TB Heel to Toes  3/10 L4           Stool Scoot  4x40'           Sit to stand             No shoe AE [employer]  3/10           Bridge with Add squeeze             SL Bridge  3x10           Clam shells             SL Sit to Stand             BOSU SLB  4/25"           Upside down BOSU SL squat holds             RMC  4x20''           PB Pull ins  3x10           TB Lat Walks  L3 3/60'           Step ups/downs  3/10 14''           HS into QS into SLR 3x10 3x10               Modalities  19           MHP             CP Heel propped x10' 10'           Stim

## 2019-07-19 ENCOUNTER — TELEPHONE (OUTPATIENT)
Dept: FAMILY MEDICINE CLINIC | Facility: CLINIC | Age: 49
End: 2019-07-19

## 2019-07-19 DIAGNOSIS — L23.7 POISON IVY DERMATITIS: Primary | ICD-10-CM

## 2019-07-19 RX ORDER — METHYLPREDNISOLONE 4 MG/1
TABLET ORAL
Qty: 21 EACH | Refills: 0 | Status: SHIPPED | OUTPATIENT
Start: 2019-07-19 | End: 2020-09-02 | Stop reason: ALTCHOICE

## 2019-07-19 NOTE — TELEPHONE ENCOUNTER
Has poison ivy  Used all topical but is spreading and not helping  Can you call in steroids for him?

## 2019-07-24 ENCOUNTER — OFFICE VISIT (OUTPATIENT)
Dept: PHYSICAL THERAPY | Facility: CLINIC | Age: 49
End: 2019-07-24
Payer: COMMERCIAL

## 2019-07-24 DIAGNOSIS — S83.241A ACUTE MEDIAL MENISCUS TEAR OF RIGHT KNEE, INITIAL ENCOUNTER: Primary | ICD-10-CM

## 2019-07-24 PROCEDURE — 97530 THERAPEUTIC ACTIVITIES: CPT | Performed by: PHYSICAL THERAPIST

## 2019-07-24 PROCEDURE — 97140 MANUAL THERAPY 1/> REGIONS: CPT | Performed by: PHYSICAL THERAPIST

## 2019-07-24 PROCEDURE — 97112 NEUROMUSCULAR REEDUCATION: CPT | Performed by: PHYSICAL THERAPIST

## 2019-07-24 PROCEDURE — 97110 THERAPEUTIC EXERCISES: CPT | Performed by: PHYSICAL THERAPIST

## 2019-07-24 NOTE — PROGRESS NOTES
PT Discharge    Today's date: 2019  Patient name: Ahsan Pizano  : 1970  MRN: [id number]  Referring provider: Charlie Menendez MD  Dx:   Encounter Diagnosis     ICD-10-CM    1  Acute medial meniscus tear of right knee, initial encounter S83 241A                   Assessment  Understanding of Dx/Px/POC: good   Prognosis: good    Goals  STGs: To be complete within 4 weeks  - Decrease pain to < 2/10 at worst  - Increase AROM to WNL  - Increase strength to > 4+/5  - Improve gait to WNL for distances < 6 blocks    LTGs: To be complete within 6 weeks  - Able to walk for any extended amount of time/distance without pain or limitation for increased safety and functional capacity with ADLs and work-related duty  - Able to repetitively squat without pain or limitation for increased safety and functional capacity with ADLs and work-related duty  - Able to repetitively ascend/descend a full flight of stairs without pain or limitation for increased safety and functional capacity with ADLs and work-related duty  - Able to repetitively complete transfers without pain or limitation for increased safety and functional capacity with ADLs and work-related duty  - Able to kneel for any extended amount of time without pain or limitation for increased safety and functional capacity with ADLs and work-related duty    Plan  Frequency: 2x week  Duration in weeks: 4       Pt will be D/C from physical therapy after today's session as he has achieved all personal and functional goals  Pt has a good understanding of HEP and has been instructed to reach out with any questions/concerns/setbacks  Subjective Evaluation    Pain  Current pain rating: 3  At best pain ratin  At worst pain ratin       Pt reports he is doing well and feels ready to safely D/C to HEP after today's session as he has achieved all personal and functional goals   Pt reports he has a good understanding of HEP and will reach out with any questions/concerns/setbacks          Objective Pain level ranges 0/10  AROM: R Knee 0-130 degrees; L Knee 0-130 degrees  Strength: R Quadriceps and Hamstrings 5/5; L Knee WNL  Gait: WNL  Swelling: WNL  Incisions clean and healing well  Tori's: (-)  PSFS: 10/10  Able to walk without pain and limitation  Able to squat without pain and limitation  Able complete transfers without pain and limitation  Able to ascend/descend stairs without pain and limitation  Able to kneel without pain and limitation           Precautions: MD protocol for being s/p R knee medial meniscectomy 7/1/19    Daily Treatment Diary    HEP: Sheet provided and discussed    Manual  7/9/19 7/23/19           ART x15' x15'           IASTM             JM             PROM and LE stretch             STM               Exercise Diary  7/9/19 7/23/19           TM  5'           Bike             NuStep             Standing 1/2 Roll calf stretch 6x20'' 6/20"           SL Ecc HR  3/10           HR/TR 3x10 3/10           TB TKE 3x10 L5 3/10 L5           TB Heel to Toes  3/10 L4           Stool Scoot  4x40'           Sit to stand             No shoe AE [employer]  3/10           Bridge with Add squeeze             SL Bridge  3x10           Clam shells             SL Sit to Stand             BOSU SLB  4/25"           Upside down BOSU SL squat holds             RMC  4x20''           PB Pull ins  3x10           TB Lat Walks  L3 3/60'           Step ups/downs  3/10 14''           HS into QS into SLR 3x10 3x10               Modalities  7/9/19 7/23/19           MHP             CP Heel propped x10' 10'           Stim

## 2020-03-23 ENCOUNTER — TELEPHONE (OUTPATIENT)
Dept: UROLOGY | Facility: CLINIC | Age: 50
End: 2020-03-23

## 2020-03-23 NOTE — TELEPHONE ENCOUNTER
Per Ramón Matthews PA-C, appointment for 3/27/20 cancelled  Patient did note have KUB and U/S done  Please contact patient to inform of cancelled appointment, remind him of imaging and reschedule once imagining completed

## 2020-03-23 NOTE — TELEPHONE ENCOUNTER
Spoke with patient he is aware that he will not be having appt on 3/27/2020, I told him he will be placed on a recall list for new appt but in the mean time patient needs to have KUB AND U/S

## 2020-04-16 ENCOUNTER — TELEPHONE (OUTPATIENT)
Dept: FAMILY MEDICINE CLINIC | Facility: CLINIC | Age: 50
End: 2020-04-16

## 2020-04-16 DIAGNOSIS — M62.838 MUSCLE SPASM: Primary | ICD-10-CM

## 2020-04-16 RX ORDER — METHOCARBAMOL 750 MG/1
750 TABLET, FILM COATED ORAL EVERY 6 HOURS PRN
Qty: 15 TABLET | Refills: 0 | Status: SHIPPED | OUTPATIENT
Start: 2020-04-16 | End: 2020-09-02 | Stop reason: ALTCHOICE

## 2020-04-17 ENCOUNTER — TELEPHONE (OUTPATIENT)
Dept: UROLOGY | Facility: CLINIC | Age: 50
End: 2020-04-17

## 2020-09-02 ENCOUNTER — OFFICE VISIT (OUTPATIENT)
Dept: FAMILY MEDICINE CLINIC | Facility: CLINIC | Age: 50
End: 2020-09-02
Payer: COMMERCIAL

## 2020-09-02 ENCOUNTER — TELEPHONE (OUTPATIENT)
Dept: FAMILY MEDICINE CLINIC | Facility: CLINIC | Age: 50
End: 2020-09-02

## 2020-09-02 VITALS
BODY MASS INDEX: 29.39 KG/M2 | TEMPERATURE: 97.5 F | SYSTOLIC BLOOD PRESSURE: 160 MMHG | WEIGHT: 229 LBS | HEIGHT: 74 IN | DIASTOLIC BLOOD PRESSURE: 112 MMHG

## 2020-09-02 DIAGNOSIS — G44.1 VASCULAR HEADACHE: Primary | ICD-10-CM

## 2020-09-02 DIAGNOSIS — I10 ESSENTIAL HYPERTENSION: ICD-10-CM

## 2020-09-02 DIAGNOSIS — G43.801 OTHER MIGRAINE WITH STATUS MIGRAINOSUS, NOT INTRACTABLE: ICD-10-CM

## 2020-09-02 DIAGNOSIS — S13.9XXA CERVICAL SPRAIN, INITIAL ENCOUNTER: ICD-10-CM

## 2020-09-02 PROCEDURE — 3080F DIAST BP >= 90 MM HG: CPT | Performed by: FAMILY MEDICINE

## 2020-09-02 PROCEDURE — 3077F SYST BP >= 140 MM HG: CPT | Performed by: FAMILY MEDICINE

## 2020-09-02 PROCEDURE — 99213 OFFICE O/P EST LOW 20 MIN: CPT | Performed by: FAMILY MEDICINE

## 2020-09-02 RX ORDER — NEBIVOLOL 5 MG/1
5 TABLET ORAL DAILY
Qty: 30 TABLET | Refills: 3 | Status: SHIPPED | OUTPATIENT
Start: 2020-09-02 | End: 2020-11-05 | Stop reason: SDUPTHER

## 2020-09-02 RX ORDER — CYCLOBENZAPRINE HCL 10 MG
10 TABLET ORAL 3 TIMES DAILY PRN
Qty: 15 TABLET | Refills: 1 | Status: SHIPPED | OUTPATIENT
Start: 2020-09-02

## 2020-09-02 NOTE — PROGRESS NOTES
BMI Counseling: Body mass index is 29 4 kg/m²  The BMI is above normal  Nutrition recommendations include decreasing portion sizes, encouraging healthy choices of fruits and vegetables, decreasing fast food intake, consuming healthier snacks, moderation in carbohydrate intake and increasing intake of lean protein  Exercise recommendations include moderate physical activity 150 minutes/week  No pharmacotherapy was ordered  Assessment/Plan:    Problem List Items Addressed This Visit     None      Visit Diagnoses     Vascular headache    -  Primary    Relevant Medications    Rimegepant Sulfate (NURTEC) 75 MG TBDP    cyclobenzaprine (FLEXERIL) 10 mg tablet    nebivolol (BYSTOLIC) 5 mg tablet    Other Relevant Orders    C-reactive protein    Vitamin B12    MRI brain wo contrast mra head wo    Other migraine with status migrainosus, not intractable        Relevant Medications    Rimegepant Sulfate (NURTEC) 75 MG TBDP    cyclobenzaprine (FLEXERIL) 10 mg tablet    nebivolol (BYSTOLIC) 5 mg tablet    Other Relevant Orders    Comprehensive metabolic panel    Lipid panel    TSH, 3rd generation with Free T4 reflex    Vitamin D 25 hydroxy    Magnesium    C-reactive protein    Folate    Cervical sprain, initial encounter        Relevant Medications    cyclobenzaprine (FLEXERIL) 10 mg tablet    Other Relevant Orders    XR spine cervical complete 4 or 5 vw non injury    Essential hypertension        Relevant Medications    nebivolol (BYSTOLIC) 5 mg tablet           Diagnoses and all orders for this visit:    Vascular headache  -     C-reactive protein; Future  -     Vitamin B12; Future  -     MRI brain wo contrast mra head wo; Future    Other migraine with status migrainosus, not intractable  -     Comprehensive metabolic panel  -     Lipid panel  -     TSH, 3rd generation with Free T4 reflex  -     Vitamin D 25 hydroxy; Future  -     Magnesium; Future  -     C-reactive protein; Future  -     Folate;  Future  - Rimegepant Sulfate (NURTEC) 75 MG TBDP; Take 75 mg by mouth daily as needed (Acute migraine headache)    Cervical sprain, initial encounter  -     cyclobenzaprine (FLEXERIL) 10 mg tablet; Take 1 tablet (10 mg total) by mouth 3 (three) times a day as needed for muscle spasms  -     XR spine cervical complete 4 or 5 vw non injury; Future    Essential hypertension  -     nebivolol (BYSTOLIC) 5 mg tablet; Take 1 tablet (5 mg total) by mouth daily        No problem-specific Assessment & Plan notes found for this encounter  Subjective:      Patient ID: Ekta Prajapati is a 53 y o  male      Mr Garry marx is here with the chief complaint of 3 days a very intense migraine headache his migraines usually start in the left temple unusually after his naproxen and sleeping his headaches will go away or at the very least they are very low-grade when he awakens and there he is able to manage this is been 3 days of constant headache this with somewhat of a vascular character to it and he just can not shake headache it awakens him from a sound sleep at night and it is a different character than in air quality than his usual headache he has not had any nausea he did not experience any aura but bright lights and loud noises bother him and that is usually what triggers his migraines there has been no trauma he also feels that he might have slept wrong to because he has a stiff neck and additional to problem is that he feels as though he is having issues with the short-term memory and so he is quite concerned about that there is no family history of Alzheimer's except for a a maternal grandmother but he only remembers her as a young child and she was already quite advanced in age at that time      The following portions of the patient's history were reviewed and updated as appropriate:   He has a past medical history of Arthritis, Back pain, DDD (degenerative disc disease), cervical, DDD (degenerative disc disease), lumbar, [facility] (hard of hearing), Hyperlipidemia, Kidney stone, Knee pain, right, Migraines, and Nocturia ,  does not have any pertinent problems on file  ,   has a past surgical history that includes Appendectomy; [place] tooth extraction; Tonsillectomy; pr cysto/uretero w/lithotripsy &indwell stent insrt (12/8/2017); pr cysto/uretero w/lithotripsy &indwell stent insrt (Right, 1/2/2018); Cystoscopy; Knee arthroscopy (Bilateral); LASIK; Vasectomy; Dental surgery; and ARTHROSCOPY KNEE (Right, 7/1/2019)  ,  family history includes Esophageal cancer in his father; No Known Problems in his mother and sister; Prostate cancer in his father; Throat cancer in his father  ,   reports that he has been smoking  He has never used smokeless tobacco  He reports that he does not drink alcohol or use drugs  ,  has No Known Allergies     Current Outpatient Medications   Medication Sig Dispense Refill    Aspirin-Acetaminophen-Caffeine (EXCEDRIN PO) Take 1 tablet by mouth as needed      cyclobenzaprine (FLEXERIL) 10 mg tablet Take 1 tablet (10 mg total) by mouth 3 (three) times a day as needed for muscle spasms 15 tablet 1    naproxen (NAPROSYN) 500 mg tablet Take 1 tablet (500 mg total) by mouth 2 (two) times a day with meals (Patient not taking: Reported on 9/2/2020) 60 tablet 0    nebivolol (BYSTOLIC) 5 mg tablet Take 1 tablet (5 mg total) by mouth daily 30 tablet 3    Rimegepant Sulfate (NURTEC) 75 MG TBDP Take 75 mg by mouth daily as needed (Acute migraine headache) 8 tablet 2     No current facility-administered medications for this visit  Review of Systems   Constitutional: Negative for activity change, appetite change, diaphoresis, fatigue and fever  HENT: Negative  Eyes: Negative  Respiratory: Negative for apnea, cough, chest tightness, shortness of breath and wheezing  Cardiovascular: Negative for chest pain, palpitations and leg swelling     Gastrointestinal: Negative for abdominal distention, abdominal pain, anal bleeding, constipation, diarrhea, nausea and vomiting  Endocrine: Negative for cold intolerance, heat intolerance, polydipsia, polyphagia and polyuria  Genitourinary: Negative for difficulty urinating, dysuria, flank pain, hematuria and urgency  Musculoskeletal: Positive for neck pain  Negative for arthralgias, back pain, gait problem, joint swelling and myalgias  Skin: Negative for color change, rash and wound  Allergic/Immunologic: Negative for environmental allergies, food allergies and immunocompromised state  Neurological: Positive for headaches  Negative for dizziness, seizures, syncope, speech difficulty and numbness  Hematological: Negative for adenopathy  Does not bruise/bleed easily  Psychiatric/Behavioral: Negative for agitation, behavioral problems, hallucinations, sleep disturbance and suicidal ideas  Objective:  Vitals:    09/02/20 1333   BP: (!) 160/112   BP Location: Left arm   Patient Position: Sitting   Cuff Size: Standard   Temp: 97 5 °F (36 4 °C)   TempSrc: Temporal   Weight: 104 kg (229 lb)   Height: 6' 2" (1 88 m)     Body mass index is 29 4 kg/m²  Physical Exam  Constitutional:       General: He is not in acute distress  Appearance: He is well-developed  He is not diaphoretic  HENT:      Head: Normocephalic  Right Ear: External ear normal       Left Ear: External ear normal       Nose: Nose normal    Eyes:      General: No scleral icterus  Right eye: No discharge  Left eye: No discharge  Conjunctiva/sclera: Conjunctivae normal       Pupils: Pupils are equal, round, and reactive to light  Neck:      Musculoskeletal: Normal range of motion  Thyroid: No thyromegaly  Trachea: No tracheal deviation  Cardiovascular:      Rate and Rhythm: Normal rate and regular rhythm  Heart sounds: Normal heart sounds  No murmur  No friction rub  No gallop  Pulmonary:      Effort: Pulmonary effort is normal  No respiratory distress  Breath sounds: Normal breath sounds  No wheezing  Abdominal:      General: Bowel sounds are normal       Palpations: Abdomen is soft  There is no mass  Tenderness: There is no abdominal tenderness  There is no guarding  Musculoskeletal:         General: No deformity  Lymphadenopathy:      Cervical: No cervical adenopathy  Skin:     General: Skin is warm and dry  Findings: No erythema or rash  Neurological:      Mental Status: He is alert and oriented to person, place, and time  Cranial Nerves: No cranial nerve deficit  Psychiatric:         Thought Content: Thought content normal        Milena Valenzuela was seen today for neck pain  Diagnoses and all orders for this visit:    Vascular headache  -     C-reactive protein; Future  -     Vitamin B12; Future  -     MRI brain wo contrast mra head wo; Future    Other migraine with status migrainosus, not intractable  -     Comprehensive metabolic panel  -     Lipid panel  -     TSH, 3rd generation with Free T4 reflex  -     Vitamin D 25 hydroxy; Future  -     Magnesium; Future  -     C-reactive protein; Future  -     Folate; Future  -     Rimegepant Sulfate (NURTEC) 75 MG TBDP; Take 75 mg by mouth daily as needed (Acute migraine headache)    Cervical sprain, initial encounter  -     cyclobenzaprine (FLEXERIL) 10 mg tablet; Take 1 tablet (10 mg total) by mouth 3 (three) times a day as needed for muscle spasms  -     XR spine cervical complete 4 or 5 vw non injury; Future    Essential hypertension  -     nebivolol (BYSTOLIC) 5 mg tablet;  Take 1 tablet (5 mg total) by mouth daily

## 2020-09-03 ENCOUNTER — LAB (OUTPATIENT)
Dept: LAB | Facility: MEDICAL CENTER | Age: 50
End: 2020-09-03
Payer: COMMERCIAL

## 2020-09-03 DIAGNOSIS — G43.801 OTHER MIGRAINE WITH STATUS MIGRAINOSUS, NOT INTRACTABLE: ICD-10-CM

## 2020-09-03 DIAGNOSIS — Z13.0 SCREENING FOR DEFICIENCY ANEMIA: ICD-10-CM

## 2020-09-03 DIAGNOSIS — G44.1 VASCULAR HEADACHE: ICD-10-CM

## 2020-09-03 DIAGNOSIS — R94.6 ABNORMAL RESULTS OF THYROID FUNCTION STUDIES: ICD-10-CM

## 2020-09-03 DIAGNOSIS — Z13.21 ENCOUNTER FOR VITAMIN DEFICIENCY SCREENING: ICD-10-CM

## 2020-09-03 DIAGNOSIS — Z09 EXAMINATION OF, FOLLOW-UP: ICD-10-CM

## 2020-09-03 LAB
25(OH)D3 SERPL-MCNC: 23.4 NG/ML (ref 30–100)
ALBUMIN SERPL BCP-MCNC: 4.1 G/DL (ref 3.5–5)
ALP SERPL-CCNC: 68 U/L (ref 46–116)
ALT SERPL W P-5'-P-CCNC: 38 U/L (ref 12–78)
ANION GAP SERPL CALCULATED.3IONS-SCNC: 4 MMOL/L (ref 4–13)
AST SERPL W P-5'-P-CCNC: 19 U/L (ref 5–45)
BILIRUB SERPL-MCNC: 0.58 MG/DL (ref 0.2–1)
BUN SERPL-MCNC: 17 MG/DL (ref 5–25)
CALCIUM SERPL-MCNC: 9.8 MG/DL (ref 8.3–10.1)
CHLORIDE SERPL-SCNC: 105 MMOL/L (ref 100–108)
CHOLEST SERPL-MCNC: 321 MG/DL (ref 50–200)
CO2 SERPL-SCNC: 29 MMOL/L (ref 21–32)
CREAT SERPL-MCNC: 1.32 MG/DL (ref 0.6–1.3)
CRP SERPL QL: <3 MG/L
FOLATE SERPL-MCNC: >20 NG/ML (ref 3.1–17.5)
GFR SERPL CREATININE-BSD FRML MDRD: 63 ML/MIN/1.73SQ M
GLUCOSE P FAST SERPL-MCNC: 113 MG/DL (ref 65–99)
HDLC SERPL-MCNC: 45 MG/DL
LDLC SERPL CALC-MCNC: 250 MG/DL (ref 0–100)
MAGNESIUM SERPL-MCNC: 2.4 MG/DL (ref 1.6–2.6)
NONHDLC SERPL-MCNC: 276 MG/DL
POTASSIUM SERPL-SCNC: 4.8 MMOL/L (ref 3.5–5.3)
PROT SERPL-MCNC: 7.8 G/DL (ref 6.4–8.2)
SODIUM SERPL-SCNC: 138 MMOL/L (ref 136–145)
TRIGL SERPL-MCNC: 128 MG/DL
TSH SERPL DL<=0.05 MIU/L-ACNC: 1.46 UIU/ML (ref 0.36–3.74)
VIT B12 SERPL-MCNC: 744 PG/ML (ref 100–900)

## 2020-09-03 PROCEDURE — 83735 ASSAY OF MAGNESIUM: CPT

## 2020-09-03 PROCEDURE — 82306 VITAMIN D 25 HYDROXY: CPT

## 2020-09-03 PROCEDURE — 82607 VITAMIN B-12: CPT

## 2020-09-03 PROCEDURE — 80053 COMPREHEN METABOLIC PANEL: CPT | Performed by: FAMILY MEDICINE

## 2020-09-03 PROCEDURE — 86140 C-REACTIVE PROTEIN: CPT

## 2020-09-03 PROCEDURE — 82746 ASSAY OF FOLIC ACID SERUM: CPT

## 2020-09-03 PROCEDURE — 36415 COLL VENOUS BLD VENIPUNCTURE: CPT

## 2020-09-03 PROCEDURE — 80061 LIPID PANEL: CPT | Performed by: FAMILY MEDICINE

## 2020-09-03 PROCEDURE — 84443 ASSAY THYROID STIM HORMONE: CPT | Performed by: FAMILY MEDICINE

## 2020-09-03 NOTE — TELEPHONE ENCOUNTER
Please work on a prior authorization for [employer] for migraine headaches patient in the past has tried over-the-counter medications like ibuprofen Aleve Tylenol he also has had sumatriptan in the past which did not work and cause significant side effects patient is no longer a candidate for Triptan is because of high blood pressure he needs nor tech for this reason

## 2020-09-04 DIAGNOSIS — E78.2 MIXED HYPERLIPIDEMIA: Primary | ICD-10-CM

## 2020-09-04 RX ORDER — ATORVASTATIN CALCIUM 20 MG/1
20 TABLET, FILM COATED ORAL DAILY
Qty: 30 TABLET | Refills: 5 | Status: SHIPPED | OUTPATIENT
Start: 2020-09-04 | End: 2020-11-05 | Stop reason: SDUPTHER

## 2020-09-08 ENCOUNTER — TELEPHONE (OUTPATIENT)
Dept: FAMILY MEDICINE CLINIC | Facility: CLINIC | Age: 50
End: 2020-09-08

## 2020-09-08 DIAGNOSIS — G43.709 CHRONIC MIGRAINE WITHOUT AURA WITHOUT STATUS MIGRAINOSUS, NOT INTRACTABLE: Primary | ICD-10-CM

## 2020-09-08 NOTE — TELEPHONE ENCOUNTER
Spoke with patient he is going to cut his Bystolic pill in half because he got a terrible headache from the full dose is going to purchase a blood pressure cuff and check his blood pressure and on he is going to check back with us this week also is MRI is now scheduled for Friday

## 2020-09-11 ENCOUNTER — HOSPITAL ENCOUNTER (OUTPATIENT)
Dept: MRI IMAGING | Facility: HOSPITAL | Age: 50
Discharge: HOME | End: 2020-09-11
Attending: FAMILY MEDICINE
Payer: COMMERCIAL

## 2020-09-11 DIAGNOSIS — G43.709 CHRONIC MIGRAINE WITHOUT AURA WITHOUT STATUS MIGRAINOSUS, NOT INTRACTABLE: ICD-10-CM

## 2020-09-11 PROCEDURE — G1004 CDSM NDSC: HCPCS

## 2020-09-11 PROCEDURE — 70551 MRI BRAIN STEM W/O DYE: CPT

## 2020-09-14 ENCOUNTER — APPOINTMENT (OUTPATIENT)
Dept: RADIOLOGY | Facility: MEDICAL CENTER | Age: 50
End: 2020-09-14
Payer: COMMERCIAL

## 2020-09-14 ENCOUNTER — HOSPITAL ENCOUNTER (OUTPATIENT)
Dept: RADIOLOGY | Facility: HOSPITAL | Age: 50
Discharge: HOME | End: 2020-09-14
Attending: ORTHOPAEDIC SURGERY

## 2020-09-14 DIAGNOSIS — S13.9XXA CERVICAL SPRAIN, INITIAL ENCOUNTER: ICD-10-CM

## 2020-09-14 DIAGNOSIS — M17.11 PRIMARY OSTEOARTHRITIS OF RIGHT KNEE: ICD-10-CM

## 2020-09-14 DIAGNOSIS — S83.241A ACUTE MEDIAL MENISCUS TEAR OF RIGHT KNEE, INITIAL ENCOUNTER: ICD-10-CM

## 2020-09-14 PROCEDURE — 72050 X-RAY EXAM NECK SPINE 4/5VWS: CPT

## 2020-09-14 NOTE — RESULT ENCOUNTER NOTE
Call patient to notify normal results no tumors ,cysts,or obvious aneurysms  How many headaches has he had in last 7 days? Also how is his BP doing on the bystolic?

## 2020-11-05 DIAGNOSIS — I10 ESSENTIAL HYPERTENSION: ICD-10-CM

## 2020-11-05 DIAGNOSIS — E78.2 MIXED HYPERLIPIDEMIA: ICD-10-CM

## 2020-11-05 RX ORDER — ATORVASTATIN CALCIUM 20 MG/1
20 TABLET, FILM COATED ORAL DAILY
Qty: 90 TABLET | Refills: 2 | Status: SHIPPED | OUTPATIENT
Start: 2020-11-05 | End: 2020-11-06 | Stop reason: SDUPTHER

## 2020-11-05 RX ORDER — NEBIVOLOL 5 MG/1
5 TABLET ORAL DAILY
Qty: 90 TABLET | Refills: 2 | Status: SHIPPED | OUTPATIENT
Start: 2020-11-05 | End: 2020-11-06 | Stop reason: SDUPTHER

## 2020-11-06 DIAGNOSIS — I10 ESSENTIAL HYPERTENSION: ICD-10-CM

## 2020-11-06 DIAGNOSIS — E78.2 MIXED HYPERLIPIDEMIA: ICD-10-CM

## 2020-11-06 RX ORDER — ATORVASTATIN CALCIUM 20 MG/1
20 TABLET, FILM COATED ORAL DAILY
Qty: 90 TABLET | Refills: 2 | Status: SHIPPED | OUTPATIENT
Start: 2020-11-06 | End: 2021-07-12

## 2020-11-06 RX ORDER — ATORVASTATIN CALCIUM 20 MG/1
20 TABLET, FILM COATED ORAL DAILY
Qty: 5 TABLET | Refills: 1 | Status: SHIPPED | OUTPATIENT
Start: 2020-11-06 | End: 2020-12-06

## 2020-11-06 RX ORDER — NEBIVOLOL 5 MG/1
5 TABLET ORAL DAILY
Qty: 90 TABLET | Refills: 2 | Status: SHIPPED | OUTPATIENT
Start: 2020-11-06 | End: 2021-07-12

## 2020-11-06 RX ORDER — NEBIVOLOL 5 MG/1
5 TABLET ORAL DAILY
Qty: 5 TABLET | Refills: 1 | Status: SHIPPED | OUTPATIENT
Start: 2020-11-06 | End: 2020-12-06

## 2020-11-30 DIAGNOSIS — R94.6 ABNORMAL RESULTS OF THYROID FUNCTION STUDIES: ICD-10-CM

## 2020-11-30 DIAGNOSIS — Z13.21 ENCOUNTER FOR VITAMIN DEFICIENCY SCREENING: Primary | ICD-10-CM

## 2020-11-30 DIAGNOSIS — Z09 EXAMINATION OF, FOLLOW-UP: ICD-10-CM

## 2020-11-30 DIAGNOSIS — Z13.0 SCREENING FOR DEFICIENCY ANEMIA: ICD-10-CM

## 2021-03-24 ENCOUNTER — OFFICE VISIT (OUTPATIENT)
Dept: FAMILY MEDICINE CLINIC | Facility: CLINIC | Age: 51
End: 2021-03-24
Payer: COMMERCIAL

## 2021-03-24 VITALS
DIASTOLIC BLOOD PRESSURE: 82 MMHG | WEIGHT: 242 LBS | BODY MASS INDEX: 31.06 KG/M2 | TEMPERATURE: 97.1 F | HEIGHT: 74 IN | SYSTOLIC BLOOD PRESSURE: 126 MMHG

## 2021-03-24 DIAGNOSIS — R51.9 CHRONIC INTRACTABLE HEADACHE, UNSPECIFIED HEADACHE TYPE: Primary | ICD-10-CM

## 2021-03-24 DIAGNOSIS — G89.29 CHRONIC INTRACTABLE HEADACHE, UNSPECIFIED HEADACHE TYPE: Primary | ICD-10-CM

## 2021-03-24 DIAGNOSIS — I10 DIASTOLIC HYPERTENSION: ICD-10-CM

## 2021-03-24 PROCEDURE — 3079F DIAST BP 80-89 MM HG: CPT | Performed by: FAMILY MEDICINE

## 2021-03-24 PROCEDURE — 3725F SCREEN DEPRESSION PERFORMED: CPT | Performed by: FAMILY MEDICINE

## 2021-03-24 PROCEDURE — 3074F SYST BP LT 130 MM HG: CPT | Performed by: FAMILY MEDICINE

## 2021-03-24 PROCEDURE — 3008F BODY MASS INDEX DOCD: CPT | Performed by: FAMILY MEDICINE

## 2021-03-24 PROCEDURE — 99213 OFFICE O/P EST LOW 20 MIN: CPT | Performed by: FAMILY MEDICINE

## 2021-03-24 RX ORDER — HYDROCHLOROTHIAZIDE 12.5 MG/1
12.5 CAPSULE, GELATIN COATED ORAL EVERY MORNING
Qty: 30 CAPSULE | Refills: 3 | Status: SHIPPED | OUTPATIENT
Start: 2021-03-24 | End: 2021-07-12 | Stop reason: SDUPTHER

## 2021-03-24 NOTE — PROGRESS NOTES
BMI Counseling: Body mass index is 31 07 kg/m²  The BMI is above normal  Nutrition recommendations include decreasing portion sizes, encouraging healthy choices of fruits and vegetables, decreasing fast food intake, consuming healthier snacks, moderation in carbohydrate intake and increasing intake of lean protein  Exercise recommendations include moderate physical activity 150 minutes/week  Assessment/Plan:  Patient needs an urgent headache neuro consult to try to start a headache planned that treats his headaches poor affectively with regards to his diastolic hypertension I am going to add 12 5 mg of hydrochlorothiazide daily in the a m  To his drug regimen he is going to watch his sodium intake    Problem List Items Addressed This Visit        Other    Chronic intractable headache - Primary    Relevant Orders    Ambulatory referral to Neurology      Other Visit Diagnoses     Diastolic hypertension        Relevant Medications    hydrochlorothiazide (MICROZIDE) 12 5 mg capsule           Diagnoses and all orders for this visit:    Chronic intractable headache, unspecified headache type  -     Ambulatory referral to Neurology; Future    Diastolic hypertension  -     hydrochlorothiazide (MICROZIDE) 12 5 mg capsule; Take 1 capsule (12 5 mg total) by mouth every morning    Other orders  -     Rimegepant Sulfate (NURTEC PO); Take by mouth As needed for a migraine        No problem-specific Assessment & Plan notes found for this encounter  Subjective:      Patient ID: Geovanna Carrington is a 53 y o  male      Patient having intractable headaches he started out with neuro technique only taking it once every 2-3 weeks now is experiencing headaches of about 4 times in 2 weeks patient id gets some relief from the [address] but any rebounds patient has also had elevated diastolic blood pressure checked by the school nurse brought pressure around 90 sometimes 92 but never any higher than 92 patient's diet is a little bit heavy on sodium he is going to try to decrease that he does take Bystolic for his blood pressure which on the systolic side works quite well      The following portions of the patient's history were reviewed and updated as appropriate:   He has a past medical history of Arthritis, Back pain, DDD (degenerative disc disease), cervical, DDD (degenerative disc disease), lumbar, [place] (hard of hearing), Hyperlipidemia, Kidney stone, Knee pain, right, Migraines, and Nocturia ,  does not have any pertinent problems on file  ,   has a past surgical history that includes Appendectomy; [place] tooth extraction; Tonsillectomy; pr cysto/uretero w/lithotripsy &indwell stent insrt (12/8/2017); pr cysto/uretero w/lithotripsy &indwell stent insrt (Right, 1/2/2018); Cystoscopy; Knee arthroscopy (Bilateral); LASIK; Vasectomy; Dental surgery; and ARTHROSCOPY KNEE (Right, 7/1/2019)  ,  family history includes Esophageal cancer in his father; No Known Problems in his mother and sister; Prostate cancer in his father; Throat cancer in his father  ,   reports that he has been smoking cigars  He has never used smokeless tobacco  He reports that he does not drink alcohol  No history on file for drug ,  has No Known Allergies     Current Outpatient Medications   Medication Sig Dispense Refill    Aspirin-Acetaminophen-Caffeine (EXCEDRIN PO) Take 1 tablet by mouth as needed      atorvastatin (LIPITOR) 20 mg tablet Take 1 tablet (20 mg total) by mouth daily 90 tablet 2    nebivolol (BYSTOLIC) 5 mg tablet Take 1 tablet (5 mg total) by mouth daily 90 tablet 2    Rimegepant Sulfate (NURTEC PO) Take by mouth As needed for a migraine      atorvastatin (LIPITOR) 20 mg tablet Take 1 tablet (20 mg total) by mouth daily 5 tablet 1    cyclobenzaprine (FLEXERIL) 10 mg tablet Take 1 tablet (10 mg total) by mouth 3 (three) times a day as needed for muscle spasms (Patient not taking: Reported on 3/24/2021) 15 tablet 1    hydrochlorothiazide (MICROZIDE) 12 5 mg capsule Take 1 capsule (12 5 mg total) by mouth every morning 30 capsule 3    naproxen (NAPROSYN) 500 mg tablet Take 1 tablet (500 mg total) by mouth 2 (two) times a day with meals (Patient not taking: Reported on 9/2/2020) 60 tablet 0    nebivolol (BYSTOLIC) 5 mg tablet Take 1 tablet (5 mg total) by mouth daily 5 tablet 1     No current facility-administered medications for this visit  Review of Systems   Constitutional: Negative for activity change, appetite change, diaphoresis, fatigue and fever  HENT: Positive for dental problem and hearing loss  Eyes: Negative  Respiratory: Negative for apnea, cough, chest tightness, shortness of breath and wheezing  Cardiovascular: Negative for chest pain, palpitations and leg swelling  Gastrointestinal: Negative for abdominal distention, abdominal pain, anal bleeding, constipation, diarrhea, nausea and vomiting  Endocrine: Negative for cold intolerance, heat intolerance, polydipsia, polyphagia and polyuria  Genitourinary: Negative for difficulty urinating, dysuria, flank pain, hematuria and urgency  Musculoskeletal: Negative for arthralgias, back pain, gait problem, joint swelling and myalgias  Skin: Negative for color change, rash and wound  Allergic/Immunologic: Negative for environmental allergies, food allergies and immunocompromised state  Neurological: Positive for headaches  Negative for dizziness, seizures, syncope, speech difficulty and numbness  Hematological: Negative for adenopathy  Does not bruise/bleed easily  Psychiatric/Behavioral: Negative for agitation, behavioral problems, hallucinations, sleep disturbance and suicidal ideas  Objective:  Vitals:    03/24/21 1444   BP: 126/82   BP Location: Left arm   Patient Position: Sitting   Cuff Size: Large   Temp: (!) 97 1 °F (36 2 °C)   TempSrc: Temporal   Weight: 110 kg (242 lb)   Height: 6' 2" (1 88 m)     Body mass index is 31 07 kg/m²       Physical Exam  Constitutional:       General: He is not in acute distress  Appearance: He is well-developed  He is obese  He is not diaphoretic  HENT:      Head: Normocephalic  Right Ear: External ear normal       Left Ear: External ear normal       Nose: Nose normal    Eyes:      General: No scleral icterus  Right eye: No discharge  Left eye: No discharge  Conjunctiva/sclera: Conjunctivae normal       Pupils: Pupils are equal, round, and reactive to light  Neck:      Musculoskeletal: Normal range of motion  Thyroid: No thyromegaly  Trachea: No tracheal deviation  Cardiovascular:      Rate and Rhythm: Normal rate and regular rhythm  Heart sounds: Normal heart sounds  No murmur  No friction rub  No gallop  Pulmonary:      Effort: Pulmonary effort is normal  No respiratory distress  Breath sounds: Normal breath sounds  No wheezing  Abdominal:      General: Bowel sounds are normal       Palpations: Abdomen is soft  There is no mass  Tenderness: There is no abdominal tenderness  There is no guarding  Musculoskeletal:         General: No deformity  Lymphadenopathy:      Cervical: No cervical adenopathy  Skin:     General: Skin is warm and dry  Findings: No erythema or rash  Neurological:      Mental Status: He is alert and oriented to person, place, and time  Cranial Nerves: No cranial nerve deficit  Psychiatric:         Thought Content:  Thought content normal

## 2021-03-26 DIAGNOSIS — Z23 ENCOUNTER FOR IMMUNIZATION: ICD-10-CM

## 2021-03-30 ENCOUNTER — TELEPHONE (OUTPATIENT)
Dept: NEUROLOGY | Facility: CLINIC | Age: 51
End: 2021-03-30

## 2021-03-30 NOTE — TELEPHONE ENCOUNTER
Spoke w pt    Offered   Wed 3/31  0900  Alferdo MERCADO    Pt can't - has meetings    Appreciated call

## 2021-04-14 ENCOUNTER — TELEPHONE (OUTPATIENT)
Dept: NEUROLOGY | Facility: CLINIC | Age: 51
End: 2021-04-14

## 2021-04-14 NOTE — TELEPHONE ENCOUNTER
Spoke with patient - offered sooner appt with Dr Carissa Fu in [facility] 4/14 @ 2p - pt declined stating he has a meeting at 2p today   Will keep on cancellation list

## 2021-05-24 ENCOUNTER — TELEPHONE (OUTPATIENT)
Dept: NEUROLOGY | Facility: CLINIC | Age: 51
End: 2021-05-24

## 2021-06-03 ENCOUNTER — CONSULT (OUTPATIENT)
Dept: NEUROLOGY | Facility: CLINIC | Age: 51
End: 2021-06-03
Payer: COMMERCIAL

## 2021-06-03 VITALS
HEIGHT: 74 IN | HEART RATE: 51 BPM | WEIGHT: 233 LBS | DIASTOLIC BLOOD PRESSURE: 84 MMHG | SYSTOLIC BLOOD PRESSURE: 129 MMHG | BODY MASS INDEX: 29.9 KG/M2

## 2021-06-03 DIAGNOSIS — G43.009 MIGRAINE WITHOUT AURA AND WITHOUT STATUS MIGRAINOSUS, NOT INTRACTABLE: Primary | ICD-10-CM

## 2021-06-03 DIAGNOSIS — R55 POSTURAL DIZZINESS WITH NEAR SYNCOPE: ICD-10-CM

## 2021-06-03 DIAGNOSIS — E78.5 HYPERLIPIDEMIA, UNSPECIFIED HYPERLIPIDEMIA TYPE: ICD-10-CM

## 2021-06-03 DIAGNOSIS — N13.2 URETERAL STONE WITH HYDRONEPHROSIS: Chronic | ICD-10-CM

## 2021-06-03 DIAGNOSIS — G44.229 CHRONIC TENSION-TYPE HEADACHE, NOT INTRACTABLE: ICD-10-CM

## 2021-06-03 DIAGNOSIS — R42 POSTURAL DIZZINESS WITH NEAR SYNCOPE: ICD-10-CM

## 2021-06-03 PROCEDURE — 3074F SYST BP LT 130 MM HG: CPT | Performed by: PSYCHIATRY & NEUROLOGY

## 2021-06-03 PROCEDURE — 99244 OFF/OP CNSLTJ NEW/EST MOD 40: CPT | Performed by: PSYCHIATRY & NEUROLOGY

## 2021-06-03 PROCEDURE — 3079F DIAST BP 80-89 MM HG: CPT | Performed by: PSYCHIATRY & NEUROLOGY

## 2021-06-03 PROCEDURE — 3008F BODY MASS INDEX DOCD: CPT | Performed by: PSYCHIATRY & NEUROLOGY

## 2021-06-03 RX ORDER — ADHESIVE BANDAGE 3/4"
BANDAGE TOPICAL AS NEEDED
Qty: 1 EACH | Refills: 0 | Status: SHIPPED | OUTPATIENT
Start: 2021-06-03 | End: 2021-09-24

## 2021-06-03 NOTE — PATIENT INSTRUCTIONS
Other instructions for headache:  Trial of: Magnesium oxide 250-500 mg daily or twice a food with food  Caution: diarrhea  Trial of: B complex daily or Vitamin B2 / Riboflavin- 400 mg daily  CoQ10 100-300 mg daily (can help with vision health and heart health)  Vitamin D 9327-3571 units daily (available over the counter)  Drink at least 64 oz of water daily-- 8 cups   No more than 8 oz of caffeinated products including green tea iced tea black tea coffee energy drinks soda, in 24 hour period  No artificial sweeteners   No MSG/chinese food  Other potential triggers:   Cheese  Chocolate  Peanut butter  Nuts  BBQ foods  Pizza  Nitrates- hot dogs, lunch meats like salami  Phosphates-- look in back of processed foods   sulfates  Alcohol  High amounts of citrus foods  Recommend headache diary such as migraine brandie elida to help identify triggers

## 2021-06-03 NOTE — PROGRESS NOTES
Patient ID: Bernadine Moffett is a 49 y o  male  Assessment/Plan:    No problem-specific Assessment & Plan notes found for this encounter  Diagnoses and all orders for this visit:    Migraine without aura and without status migrainosus, not intractable  -  Abortive: Nurtec, this does help significantly dull the headache within 2 hours  Preventative: Bystolic and hydrochlorothiazide have helped control his hypertension and thus his headache frequency is improved  Other instructions for headache:  Trial of: Magnesium oxide 250-500 mg daily or twice a food with food  Caution: diarrhea  Trial of: B complex daily or Vitamin B2 / Riboflavin- 400 mg daily  CoQ10 100-300 mg daily (can help with vision health and heart health)  Vitamin D 8222-7527 units daily (available over the counter)  Drink at least 64 oz of water daily-- 8 cups   No more than 8 oz of caffeinated products including green tea iced tea black tea coffee energy drinks soda, in 24 hour period  No artificial sweeteners   No MSG/chinese food  Other potential triggers:   Cheese  Chocolate  Peanut butter  Nuts  BBQ foods  Pizza  Nitrates- hot dogs, lunch meats like salami  Phosphates-- look in back of processed foods   sulfates  Alcohol  High amounts of citrus foods  Recommend headache diary such as migraine buddy elida to help identify triggers  Chronic tension-type headache, not intractable  -     Discussed adding magnesium oxide 250-1000 mg daily in caution diarrhea  Postural dizziness with near syncope--  He is describing cerebral hypoperfusion as he reports tunnel vision with standing  I did discuss with the patient to obtain a blood pressure cuff and monitor his blood pressure and heart rate when this happens  Will also obtain CTA head and neck to make sure no significant vascular pathology    -     CTA head and neck w wo contrast; Future  -     Blood Pressure Monitoring (Blood Pressure Cuff) MISC; Use as needed (when dizzy)    Ureteral stone with hydronephrosis  Comments:  hx of    Hyperlipidemia, unspecified hyperlipidemia type    On statin or PCP     follow-up with me or [facility] based on availability in six months time  Any other questions or concerns I can certainly see him sooner than 6 months  Subjective:    HPI    Mr  Romelia May Is a pleasant 45-year-old male seen in consultation for headaches onset years ago however with recently increased frequency, see history below  Thankfully he is doing better since his blood pressure medication adjustment by PCP and better controlled blood pressure  Triggers: perfume,   Change in temperature such as walking from warm to cold air from [facility]  Or vice versa,    severe headaches description:Left sided throbbing more severe, with nausea dry heaving has to lay down- once a month  Worse with exertion  Medications tried: two excedrin migraine and nap would help dull the headache    States over time especially last summer early fall started to have worse frequency- two/week   Severe headaches with tension type headaches in between  States started feeling neck tightness sensation- states went to school nurse and had HTN- 190s/100  Was started on bystolic with reduction in migraine and BP but states after one to two months started having neck tightness and tension type headaches with BP readings showing elevated diastolic reading again  Was then added on HCTZ with improved symptoms  Does take nurtec abortively as prescribed by PCP    Headaches now frontal dull tension type headache- mild twice a week  No clear triggers  Does have neck discomfort, states was told he has degenerative disc disease a long time ago  States does wake up in the middle of the night with headaches- worse over the last year   However states he would have had a dull low-grade headache the day before  States these are at the vertex of the head  States excedrin migraine helps   Tylenol can help  States does have heart burn now from taking various NSAIDs for back pain knee pain for knee surgeries  Discussed limiting this and ideally stopping them  Tylenol okay as long as he does take more than the recommended dose  States can have too much caffeine and this can be a trigger  MRI brain with no significant abnormality  Son with migraines    Also reports vertigo: first noted two years ago, noted out of sleep,  Significant room spinning states he went to PCP and that was taken care of  States last year was driving and had onset of sudden road moving sensation and had to pull over  Since then going from sitting to standing , feels tunnel vision and then he has to wait for it to pass    Patient is bradycardic today 52  No correlation to headache    Tells me tries to stay hydrated  Occupation: Superintendent at school  History  Ureteral stone with hydronephrosis requiring stent placement a few years ago  He did follow with urology then as recommended  TSH  normal, CMP  With elevated creatinine however this is improved from prior, lipid panel  With elevated cholesterol-  321 and -has since been started on a statin by his PCP, B12, folate reviewed  The following portions of the patient's history were reviewed and updated as appropriate: allergies, current medications, past family history, past medical history, past social history, past surgical history and problem list and ROS         Objective:    Blood pressure 129/84, pulse (!) 51, height 6' 2" (1 88 m), weight 106 kg (233 lb)  Physical Exam  Vitals signs and nursing note reviewed  Constitutional:       Appearance: He is well-developed  HENT:      Head: Normocephalic and atraumatic  Eyes:      Extraocular Movements: Extraocular movements intact  Conjunctiva/sclera: Conjunctivae normal       Pupils: Pupils are equal, round, and reactive to light  Neck:      Musculoskeletal: Normal range of motion and neck supple     Cardiovascular:      Rate and Rhythm: Normal rate and regular rhythm  Pulmonary:      Effort: Pulmonary effort is normal    Musculoskeletal: Normal range of motion  Neurological:      Mental Status: He is alert  Cranial Nerves: No dysarthria  Coordination: Coordination is intact  Romberg sign negative  Deep Tendon Reflexes: Strength normal and reflexes are normal and symmetric  Neurological Exam  Mental Status  Alert  Oriented to person, place, time and situation  Recent and remote memory are intact  no dysarthria present  Language is fluent with no aphasia  Attention and concentration are normal     Cranial Nerves  CN II: Visual fields full to confrontation  CN III, IV, VI: Extraocular movements intact bilaterally  Pupils equal round and reactive to light bilaterally  CN V: Facial sensation is normal   CN VII: Full and symmetric facial movement  CN VIII: Hearing is normal   CN IX, X: Palate elevates symmetrically  CN XI: Shoulder shrug strength is normal   CN XII: Tongue midline without atrophy or fasciculations  Motor   Normal muscle tone  Strength is 5/5 throughout all four extremities  Sensory  Light touch is normal in upper and lower extremities  Temperature is normal in upper and lower extremities  Vibration is normal in upper and lower extremities  No right-sided hemispatial neglect  No left-sided hemispatial neglect  Reflexes  Deep tendon reflexes are 2+ and symmetric in all four extremities with downgoing toes bilaterally  Coordination  Finger-to-nose, rapid alternating movements and heel-to-shin normal bilaterally without dysmetria  Gait  Casual gait is normal including stance, stride, and arm swing  Romberg is absent  ROS:    Review of Systems   Constitutional: Negative  Negative for appetite change and fever  HENT: Negative  Negative for hearing loss, tinnitus, trouble swallowing and voice change  Eyes: Negative  Negative for photophobia and pain  Respiratory: Negative    Negative for shortness of breath  Cardiovascular: Negative  Negative for palpitations  Gastrointestinal: Negative  Negative for nausea and vomiting  Endocrine: Negative  Negative for cold intolerance  Genitourinary: Negative  Negative for dysuria, frequency and urgency  Musculoskeletal: Negative  Negative for myalgias and neck pain  Skin: Negative  Negative for rash  Neurological: Positive for dizziness, light-headedness and headaches  Negative for tremors, seizures, syncope, facial asymmetry, speech difficulty, weakness and numbness  Hematological: Negative  Does not bruise/bleed easily  Psychiatric/Behavioral: Negative  Negative for confusion, hallucinations and sleep disturbance  All other systems reviewed and are negative

## 2021-06-07 ENCOUNTER — TELEPHONE (OUTPATIENT)
Dept: NEUROLOGY | Facility: CLINIC | Age: 51
End: 2021-06-07

## 2021-06-07 DIAGNOSIS — R42 POSTURAL DIZZINESS WITH NEAR SYNCOPE: Primary | ICD-10-CM

## 2021-06-07 DIAGNOSIS — R55 POSTURAL DIZZINESS WITH NEAR SYNCOPE: Primary | ICD-10-CM

## 2021-06-07 NOTE — TELEPHONE ENCOUNTER
Dr Jose Ivan - please place labs for CTA on 6/11/21    Once ordered, will call patient and inform him

## 2021-06-07 NOTE — TELEPHONE ENCOUNTER
----- Message from Jonathan Jordan RN sent at 6/7/2021  8:07 AM EDT -----  Regarding: FW: Prescription Question  Contact: [phone]    ----- Message -----  From: Lizandro Michelle  Sent: 6/4/2021   9:40 PM EDT  To: Neurology [facility] Clinical Team 5  Subject: Prescription Question                            The scheduling office indicated that I need to have blood work done prior to CAT Scan next Friday  Can you submit a prescription for the blood work? Thanks       Vadim Junior

## 2021-06-08 NOTE — TELEPHONE ENCOUNTER
pt called regarding below  CT is scheduled for Friday  I entered orders for bun/creat     Pt will use  lab

## 2021-06-10 ENCOUNTER — APPOINTMENT (OUTPATIENT)
Dept: LAB | Facility: CLINIC | Age: 51
End: 2021-06-10
Payer: COMMERCIAL

## 2021-06-10 DIAGNOSIS — R55 POSTURAL DIZZINESS WITH NEAR SYNCOPE: ICD-10-CM

## 2021-06-10 DIAGNOSIS — R42 POSTURAL DIZZINESS WITH NEAR SYNCOPE: ICD-10-CM

## 2021-06-10 LAB
25(OH)D3 SERPL-MCNC: 24.4 NG/ML (ref 30–100)
BUN SERPL-MCNC: 21 MG/DL (ref 5–25)
CREAT SERPL-MCNC: 1.19 MG/DL (ref 0.6–1.3)
FOLATE SERPL-MCNC: >20 NG/ML (ref 3.1–17.5)
GFR SERPL CREATININE-BSD FRML MDRD: 71 ML/MIN/1.73SQ M
TSH SERPL DL<=0.05 MIU/L-ACNC: 4.12 UIU/ML (ref 0.36–3.74)
VIT B12 SERPL-MCNC: 529 PG/ML (ref 100–900)

## 2021-06-10 PROCEDURE — 84520 ASSAY OF UREA NITROGEN: CPT

## 2021-06-10 PROCEDURE — 84443 ASSAY THYROID STIM HORMONE: CPT

## 2021-06-10 PROCEDURE — 82565 ASSAY OF CREATININE: CPT

## 2021-06-10 PROCEDURE — 82306 VITAMIN D 25 HYDROXY: CPT

## 2021-06-10 PROCEDURE — 82607 VITAMIN B-12: CPT

## 2021-06-10 PROCEDURE — 82746 ASSAY OF FOLIC ACID SERUM: CPT

## 2021-06-10 PROCEDURE — 36415 COLL VENOUS BLD VENIPUNCTURE: CPT

## 2021-06-11 ENCOUNTER — HOSPITAL ENCOUNTER (OUTPATIENT)
Dept: CT IMAGING | Facility: HOSPITAL | Age: 51
Discharge: HOME | End: 2021-06-11
Attending: PSYCHIATRY & NEUROLOGY
Payer: COMMERCIAL

## 2021-06-11 DIAGNOSIS — R42 POSTURAL DIZZINESS WITH NEAR SYNCOPE: ICD-10-CM

## 2021-06-11 DIAGNOSIS — R55 POSTURAL DIZZINESS WITH NEAR SYNCOPE: ICD-10-CM

## 2021-06-11 PROCEDURE — 70496 CT ANGIOGRAPHY HEAD: CPT

## 2021-06-11 PROCEDURE — 70498 CT ANGIOGRAPHY NECK: CPT

## 2021-06-11 PROCEDURE — G1004 CDSM NDSC: HCPCS

## 2021-06-11 RX ADMIN — IOHEXOL 85 ML: 350 INJECTION, SOLUTION INTRAVENOUS at 13:31

## 2021-07-07 DIAGNOSIS — R42 POSTURAL DIZZINESS WITH NEAR SYNCOPE: ICD-10-CM

## 2021-07-07 DIAGNOSIS — G43.709 CHRONIC MIGRAINE WITHOUT AURA WITHOUT STATUS MIGRAINOSUS, NOT INTRACTABLE: ICD-10-CM

## 2021-07-07 DIAGNOSIS — R51.9 CHRONIC INTRACTABLE HEADACHE, UNSPECIFIED HEADACHE TYPE: ICD-10-CM

## 2021-07-07 DIAGNOSIS — G43.009 MIGRAINE WITHOUT AURA AND WITHOUT STATUS MIGRAINOSUS, NOT INTRACTABLE: Primary | ICD-10-CM

## 2021-07-07 DIAGNOSIS — R55 POSTURAL DIZZINESS WITH NEAR SYNCOPE: ICD-10-CM

## 2021-07-07 DIAGNOSIS — G89.29 CHRONIC INTRACTABLE HEADACHE, UNSPECIFIED HEADACHE TYPE: ICD-10-CM

## 2021-07-07 DIAGNOSIS — Z13.21 ENCOUNTER FOR VITAMIN DEFICIENCY SCREENING: ICD-10-CM

## 2021-07-12 DIAGNOSIS — I10 ESSENTIAL HYPERTENSION: ICD-10-CM

## 2021-07-12 DIAGNOSIS — E78.2 MIXED HYPERLIPIDEMIA: ICD-10-CM

## 2021-07-12 DIAGNOSIS — I10 DIASTOLIC HYPERTENSION: ICD-10-CM

## 2021-07-12 RX ORDER — NEBIVOLOL HYDROCHLORIDE 5 MG/1
TABLET ORAL
Qty: 90 TABLET | Refills: 3 | Status: SHIPPED | OUTPATIENT
Start: 2021-07-12 | End: 2022-06-23

## 2021-07-12 RX ORDER — ATORVASTATIN CALCIUM 20 MG/1
TABLET, FILM COATED ORAL
Qty: 90 TABLET | Refills: 3 | Status: SHIPPED | OUTPATIENT
Start: 2021-07-12 | End: 2022-06-23

## 2021-07-13 RX ORDER — HYDROCHLOROTHIAZIDE 12.5 MG/1
12.5 CAPSULE, GELATIN COATED ORAL EVERY MORNING
Qty: 90 CAPSULE | Refills: 3 | Status: SHIPPED | OUTPATIENT
Start: 2021-07-13 | End: 2022-06-23

## 2021-09-24 ENCOUNTER — OFFICE VISIT (OUTPATIENT)
Dept: FAMILY MEDICINE CLINIC | Facility: CLINIC | Age: 51
End: 2021-09-24
Payer: COMMERCIAL

## 2021-09-24 ENCOUNTER — APPOINTMENT (OUTPATIENT)
Dept: RADIOLOGY | Facility: MEDICAL CENTER | Age: 51
End: 2021-09-24
Payer: COMMERCIAL

## 2021-09-24 VITALS
DIASTOLIC BLOOD PRESSURE: 84 MMHG | WEIGHT: 232.8 LBS | HEIGHT: 74 IN | BODY MASS INDEX: 29.88 KG/M2 | TEMPERATURE: 96 F | SYSTOLIC BLOOD PRESSURE: 126 MMHG

## 2021-09-24 DIAGNOSIS — M54.14 RADICULAR PAIN OF THORACIC REGION: Primary | ICD-10-CM

## 2021-09-24 DIAGNOSIS — M54.14 RADICULAR PAIN OF THORACIC REGION: ICD-10-CM

## 2021-09-24 PROCEDURE — 3008F BODY MASS INDEX DOCD: CPT | Performed by: PHYSICIAN ASSISTANT

## 2021-09-24 PROCEDURE — 99213 OFFICE O/P EST LOW 20 MIN: CPT | Performed by: PHYSICIAN ASSISTANT

## 2021-09-24 PROCEDURE — 72072 X-RAY EXAM THORAC SPINE 3VWS: CPT

## 2021-09-24 NOTE — PROGRESS NOTES
Assessment/Plan:    Problem List Items Addressed This Visit     None      Visit Diagnoses     Radicular pain of thoracic region    -  Primary    Relevant Orders    XR spine thoracic 3 vw           Diagnoses and all orders for this visit:    Radicular pain of thoracic region  -     XR spine thoracic 3 vw; Future        No problem-specific Assessment & Plan notes found for this encounter  Subjective:      Patient ID: Ksenia Anderson is a 49 y o  male  Maria Alejandra Mccullough is here today complaining of ongoing pain along R mid back  This is present x few months  Nothing improves or worsens pain  Describes sharp stabbing pain under R scapula and at times feels it below R breast/nipple at ribs  The following portions of the patient's history were reviewed and updated as appropriate:   He has a past medical history of Arthritis, Back pain, DDD (degenerative disc disease), cervical, DDD (degenerative disc disease), lumbar, [place] (hard of hearing), Hyperlipidemia, Kidney stone, Knee pain, right, Migraines, and Nocturia ,  does not have any pertinent problems on file  ,   has a past surgical history that includes Appendectomy; [place] tooth extraction; Tonsillectomy; pr cysto/uretero w/lithotripsy &indwell stent insrt (12/8/2017); pr cysto/uretero w/lithotripsy &indwell stent insrt (Right, 1/2/2018); Cystoscopy; Knee arthroscopy (Bilateral); LASIK; Vasectomy; Dental surgery; and ARTHROSCOPY KNEE (Right, 7/1/2019)  ,  family history includes Esophageal cancer in his father; No Known Problems in his mother and sister; Prostate cancer in his father; Throat cancer in his father  ,   reports that he has been smoking cigars  He has never used smokeless tobacco  He reports that he does not drink alcohol  No history on file for drug use ,  has No Known Allergies     Current Outpatient Medications   Medication Sig Dispense Refill    atorvastatin (LIPITOR) 20 mg tablet Take 1 tablet (20 mg total) by mouth daily 5 tablet 1    atorvastatin (LIPITOR) 20 mg tablet TAKE 1 TABLET DAILY 90 tablet 3    Bystolic 5 MG tablet TAKE 1 TABLET DAILY 90 tablet 3    cyclobenzaprine (FLEXERIL) 10 mg tablet Take 1 tablet (10 mg total) by mouth 3 (three) times a day as needed for muscle spasms 15 tablet 1    hydrochlorothiazide (MICROZIDE) 12 5 mg capsule Take 1 capsule (12 5 mg total) by mouth every morning 90 capsule 3    nebivolol (BYSTOLIC) 5 mg tablet Take 1 tablet (5 mg total) by mouth daily 5 tablet 1    Rimegepant Sulfate (NURTEC PO) Take by mouth As needed for a migraine       No current facility-administered medications for this visit  Review of Systems   Constitutional: Negative for activity change, appetite change, chills, diaphoresis, fatigue, fever and unexpected weight change  HENT: Negative for congestion, ear pain, postnasal drip, rhinorrhea, sinus pressure, sinus pain, sneezing, sore throat, tinnitus and voice change  Eyes: Negative for pain, redness and visual disturbance  Respiratory: Negative for cough, chest tightness, shortness of breath and wheezing  Cardiovascular: Negative for chest pain, palpitations and leg swelling  Gastrointestinal: Negative for abdominal pain, blood in stool, constipation, diarrhea, nausea and vomiting  Genitourinary: Negative for difficulty urinating, dysuria, frequency, hematuria and urgency  Musculoskeletal: Negative for arthralgias, back pain, gait problem, joint swelling, myalgias, neck pain and neck stiffness  Skin: Negative for color change, pallor, rash and wound  Neurological: Negative for dizziness, tremors, weakness, light-headedness and headaches  Psychiatric/Behavioral: Negative for dysphoric mood, self-injury, sleep disturbance and suicidal ideas  The patient is not nervous/anxious            Objective:  Vitals:    09/24/21 0829   BP: 126/84   BP Location: Left arm   Patient Position: Sitting   Cuff Size: Large   Temp: (!) 96 °F (35 6 °C)   TempSrc: Temporal   Weight: 106 kg (232 lb 12 8 oz)   Height: 6' 2" (1 88 m)     Body mass index is 29 89 kg/m²  Physical Exam  Vitals reviewed  Constitutional:       General: He is not in acute distress  Appearance: He is well-developed  He is not diaphoretic  HENT:      Head: Normocephalic and atraumatic  Right Ear: Hearing, tympanic membrane, ear canal and external ear normal       Left Ear: Hearing, tympanic membrane, ear canal and external ear normal       Mouth/Throat:      Pharynx: Uvula midline  No oropharyngeal exudate  Eyes:      General: No scleral icterus  Right eye: No discharge  Left eye: No discharge  Conjunctiva/sclera: Conjunctivae normal    Neck:      Thyroid: No thyromegaly  Vascular: No carotid bruit  Cardiovascular:      Rate and Rhythm: Normal rate and regular rhythm  Heart sounds: Normal heart sounds  No murmur heard  Pulmonary:      Effort: Pulmonary effort is normal  No respiratory distress  Breath sounds: Normal breath sounds  No wheezing  Abdominal:      General: Bowel sounds are normal  There is no distension  Palpations: Abdomen is soft  There is no mass  Tenderness: There is no abdominal tenderness  There is no guarding or rebound  Musculoskeletal:         General: No tenderness  Normal range of motion  Cervical back: Neck supple  Lymphadenopathy:      Cervical: No cervical adenopathy  Skin:     General: Skin is warm and dry  Findings: No erythema or rash  Neurological:      Mental Status: He is alert and oriented to person, place, and time  Psychiatric:         Behavior: Behavior normal          Thought Content:  Thought content normal          Judgment: Judgment normal

## 2021-09-30 ENCOUNTER — TELEPHONE (OUTPATIENT)
Dept: FAMILY MEDICINE CLINIC | Facility: CLINIC | Age: 51
End: 2021-09-30

## 2021-10-05 DIAGNOSIS — M54.14 RADICULAR PAIN OF THORACIC REGION: Primary | ICD-10-CM

## 2021-10-18 ENCOUNTER — TELEPHONE (OUTPATIENT)
Dept: FAMILY MEDICINE CLINIC | Facility: CLINIC | Age: 51
End: 2021-10-18

## 2021-10-18 DIAGNOSIS — M54.14 RADICULAR PAIN OF THORACIC REGION: Primary | ICD-10-CM

## 2021-10-19 ENCOUNTER — TELEPHONE (OUTPATIENT)
Dept: FAMILY MEDICINE CLINIC | Facility: CLINIC | Age: 51
End: 2021-10-19

## 2021-10-21 ENCOUNTER — OFFICE VISIT (OUTPATIENT)
Dept: FAMILY MEDICINE CLINIC | Facility: CLINIC | Age: 51
End: 2021-10-21
Payer: COMMERCIAL

## 2021-10-21 VITALS
WEIGHT: 238 LBS | TEMPERATURE: 97 F | BODY MASS INDEX: 30.54 KG/M2 | DIASTOLIC BLOOD PRESSURE: 88 MMHG | HEIGHT: 74 IN | SYSTOLIC BLOOD PRESSURE: 124 MMHG

## 2021-10-21 DIAGNOSIS — R10.11 RIGHT UPPER QUADRANT ABDOMINAL PAIN: Primary | ICD-10-CM

## 2021-10-21 DIAGNOSIS — G43.709 CHRONIC MIGRAINE WITHOUT AURA WITHOUT STATUS MIGRAINOSUS, NOT INTRACTABLE: ICD-10-CM

## 2021-10-21 DIAGNOSIS — K81.9 CHOLECYSTITIS: ICD-10-CM

## 2021-10-21 PROCEDURE — 99213 OFFICE O/P EST LOW 20 MIN: CPT | Performed by: FAMILY MEDICINE

## 2021-10-21 PROCEDURE — 4004F PT TOBACCO SCREEN RCVD TLK: CPT | Performed by: FAMILY MEDICINE

## 2021-10-21 PROCEDURE — 3008F BODY MASS INDEX DOCD: CPT | Performed by: FAMILY MEDICINE

## 2021-10-21 RX ORDER — RIMEGEPANT SULFATE 75 MG/75MG
75 TABLET, ORALLY DISINTEGRATING ORAL 2 TIMES DAILY
Qty: 10 TABLET | Refills: 0 | Status: SHIPPED | OUTPATIENT
Start: 2021-10-21 | End: 2021-12-13

## 2021-10-22 ENCOUNTER — HOSPITAL ENCOUNTER (OUTPATIENT)
Dept: ULTRASOUND IMAGING | Facility: HOSPITAL | Age: 51
Discharge: HOME | End: 2021-10-22
Attending: FAMILY MEDICINE
Payer: COMMERCIAL

## 2021-10-22 ENCOUNTER — LAB (OUTPATIENT)
Dept: LAB | Facility: HOSPITAL | Age: 51
End: 2021-10-22
Attending: FAMILY MEDICINE
Payer: COMMERCIAL

## 2021-10-22 DIAGNOSIS — K81.9 CHOLECYSTITIS: ICD-10-CM

## 2021-10-22 DIAGNOSIS — R10.11 RIGHT UPPER QUADRANT ABDOMINAL PAIN: ICD-10-CM

## 2021-10-22 LAB
ALBUMIN SERPL BCP-MCNC: 3.7 G/DL (ref 3.5–5)
ALP SERPL-CCNC: 73 U/L (ref 46–116)
ALT SERPL W P-5'-P-CCNC: 58 U/L (ref 12–78)
ANION GAP SERPL CALCULATED.3IONS-SCNC: 5 MMOL/L (ref 4–13)
AST SERPL W P-5'-P-CCNC: 24 U/L (ref 5–45)
BASOPHILS # BLD AUTO: 0.05 THOUSANDS/ΜL (ref 0–0.1)
BASOPHILS NFR BLD AUTO: 1 % (ref 0–1)
BILIRUB SERPL-MCNC: 0.73 MG/DL (ref 0.2–1)
BUN SERPL-MCNC: 19 MG/DL (ref 5–25)
CALCIUM SERPL-MCNC: 9.2 MG/DL (ref 8.3–10.1)
CHLORIDE SERPL-SCNC: 104 MMOL/L (ref 100–108)
CO2 SERPL-SCNC: 32 MMOL/L (ref 21–32)
CREAT SERPL-MCNC: 1.33 MG/DL (ref 0.6–1.3)
EOSINOPHIL # BLD AUTO: 0.09 THOUSAND/ΜL (ref 0–0.61)
EOSINOPHIL NFR BLD AUTO: 1 % (ref 0–6)
ERYTHROCYTE [DISTWIDTH] IN BLOOD BY AUTOMATED COUNT: 12.9 % (ref 11.6–15.1)
GFR SERPL CREATININE-BSD FRML MDRD: 62 ML/MIN/1.73SQ M
GLUCOSE P FAST SERPL-MCNC: 104 MG/DL (ref 65–99)
HCT VFR BLD AUTO: 44.8 % (ref 36.5–49.3)
HGB BLD-MCNC: 15.1 G/DL (ref 12–17)
IMM GRANULOCYTES # BLD AUTO: 0.03 THOUSAND/UL (ref 0–0.2)
IMM GRANULOCYTES NFR BLD AUTO: 0 % (ref 0–2)
LYMPHOCYTES # BLD AUTO: 2.41 THOUSANDS/ΜL (ref 0.6–4.47)
LYMPHOCYTES NFR BLD AUTO: 34 % (ref 14–44)
MCH RBC QN AUTO: 30.8 PG (ref 26.8–34.3)
MCHC RBC AUTO-ENTMCNC: 33.7 G/DL (ref 31.4–37.4)
MCV RBC AUTO: 91 FL (ref 82–98)
MONOCYTES # BLD AUTO: 0.59 THOUSAND/ΜL (ref 0.17–1.22)
MONOCYTES NFR BLD AUTO: 8 % (ref 4–12)
NEUTROPHILS # BLD AUTO: 3.97 THOUSANDS/ΜL (ref 1.85–7.62)
NEUTS SEG NFR BLD AUTO: 56 % (ref 43–75)
NRBC BLD AUTO-RTO: 0 /100 WBCS
PLATELET # BLD AUTO: 205 THOUSANDS/UL (ref 149–390)
PMV BLD AUTO: 10.9 FL (ref 8.9–12.7)
POTASSIUM SERPL-SCNC: 4.6 MMOL/L (ref 3.5–5.3)
PROT SERPL-MCNC: 7 G/DL (ref 6.4–8.2)
RBC # BLD AUTO: 4.91 MILLION/UL (ref 3.88–5.62)
SODIUM SERPL-SCNC: 141 MMOL/L (ref 136–145)
WBC # BLD AUTO: 7.14 THOUSAND/UL (ref 4.31–10.16)

## 2021-10-22 PROCEDURE — 80053 COMPREHEN METABOLIC PANEL: CPT

## 2021-10-22 PROCEDURE — 85025 COMPLETE CBC W/AUTO DIFF WBC: CPT

## 2021-10-22 PROCEDURE — 36415 COLL VENOUS BLD VENIPUNCTURE: CPT

## 2021-10-22 PROCEDURE — 76705 ECHO EXAM OF ABDOMEN: CPT

## 2021-10-26 ENCOUNTER — HOSPITAL ENCOUNTER (OUTPATIENT)
Dept: MRI IMAGING | Facility: HOSPITAL | Age: 51
Discharge: HOME | End: 2021-10-26
Attending: FAMILY MEDICINE
Payer: COMMERCIAL

## 2021-10-26 DIAGNOSIS — R16.0 LIVER MASS: ICD-10-CM

## 2021-10-26 PROCEDURE — 76376 3D RENDER W/INTRP POSTPROCES: CPT

## 2021-10-26 PROCEDURE — A9585 GADOBUTROL INJECTION: HCPCS | Performed by: FAMILY MEDICINE

## 2021-10-26 PROCEDURE — G1004 CDSM NDSC: HCPCS

## 2021-10-26 PROCEDURE — 74183 MRI ABD W/O CNTR FLWD CNTR: CPT

## 2021-10-26 RX ADMIN — GADOBUTROL 10 ML: 604.72 INJECTION INTRAVENOUS at 16:22

## 2021-11-09 ENCOUNTER — HOSPITAL ENCOUNTER (OUTPATIENT)
Dept: NUCLEAR MEDICINE | Facility: HOSPITAL | Age: 51
Discharge: HOME | End: 2021-11-09
Attending: FAMILY MEDICINE
Payer: COMMERCIAL

## 2021-11-09 DIAGNOSIS — R10.11 COLICKY RIGHT UPPER QUADRANT PAIN: ICD-10-CM

## 2021-11-09 PROCEDURE — G1004 CDSM NDSC: HCPCS

## 2021-11-09 PROCEDURE — A9537 TC99M MEBROFENIN: HCPCS

## 2021-11-09 PROCEDURE — 78227 HEPATOBIL SYST IMAGE W/DRUG: CPT

## 2021-11-09 RX ADMIN — SINCALIDE 2.2 MCG: 5 INJECTION, POWDER, LYOPHILIZED, FOR SOLUTION INTRAVENOUS at 11:59

## 2021-11-10 ENCOUNTER — CONSULT (OUTPATIENT)
Dept: GASTROENTEROLOGY | Facility: CLINIC | Age: 51
End: 2021-11-10
Payer: COMMERCIAL

## 2021-11-10 VITALS
TEMPERATURE: 98.7 F | BODY MASS INDEX: 30.54 KG/M2 | HEART RATE: 57 BPM | SYSTOLIC BLOOD PRESSURE: 141 MMHG | DIASTOLIC BLOOD PRESSURE: 91 MMHG | HEIGHT: 74 IN | WEIGHT: 238 LBS

## 2021-11-10 DIAGNOSIS — R10.11 RIGHT UPPER QUADRANT ABDOMINAL PAIN: Primary | ICD-10-CM

## 2021-11-10 DIAGNOSIS — K82.8 BILIARY DYSKINESIA: ICD-10-CM

## 2021-11-10 DIAGNOSIS — K82.8 BILIARY DYSKINESIA: Primary | ICD-10-CM

## 2021-11-10 DIAGNOSIS — Z12.11 COLON CANCER SCREENING: ICD-10-CM

## 2021-11-10 PROCEDURE — 99244 OFF/OP CNSLTJ NEW/EST MOD 40: CPT | Performed by: INTERNAL MEDICINE

## 2021-11-10 PROCEDURE — 4004F PT TOBACCO SCREEN RCVD TLK: CPT | Performed by: INTERNAL MEDICINE

## 2021-11-10 PROCEDURE — 3008F BODY MASS INDEX DOCD: CPT | Performed by: INTERNAL MEDICINE

## 2021-11-10 RX ORDER — OMEPRAZOLE 20 MG/1
20 CAPSULE, DELAYED RELEASE ORAL DAILY
Qty: 30 CAPSULE | Refills: 3 | Status: SHIPPED | OUTPATIENT
Start: 2021-11-10 | End: 2022-01-04 | Stop reason: SDUPTHER

## 2021-12-06 ENCOUNTER — TELEPHONE (OUTPATIENT)
Dept: NEUROLOGY | Facility: CLINIC | Age: 51
End: 2021-12-06

## 2021-12-13 ENCOUNTER — OFFICE VISIT (OUTPATIENT)
Dept: NEUROLOGY | Facility: CLINIC | Age: 51
End: 2021-12-13
Payer: COMMERCIAL

## 2021-12-13 VITALS
DIASTOLIC BLOOD PRESSURE: 85 MMHG | RESPIRATION RATE: 18 BRPM | HEART RATE: 61 BPM | WEIGHT: 240.9 LBS | SYSTOLIC BLOOD PRESSURE: 137 MMHG | BODY MASS INDEX: 30.92 KG/M2 | TEMPERATURE: 97.3 F | HEIGHT: 74 IN

## 2021-12-13 DIAGNOSIS — G43.009 MIGRAINE WITHOUT AURA AND WITHOUT STATUS MIGRAINOSUS, NOT INTRACTABLE: Primary | ICD-10-CM

## 2021-12-13 DIAGNOSIS — G43.709 CHRONIC MIGRAINE WITHOUT AURA WITHOUT STATUS MIGRAINOSUS, NOT INTRACTABLE: ICD-10-CM

## 2021-12-13 DIAGNOSIS — G44.229 CHRONIC TENSION-TYPE HEADACHE, NOT INTRACTABLE: ICD-10-CM

## 2021-12-13 PROCEDURE — 4004F PT TOBACCO SCREEN RCVD TLK: CPT | Performed by: PHYSICIAN ASSISTANT

## 2021-12-13 PROCEDURE — 3008F BODY MASS INDEX DOCD: CPT | Performed by: PHYSICIAN ASSISTANT

## 2021-12-13 PROCEDURE — 99214 OFFICE O/P EST MOD 30 MIN: CPT | Performed by: PHYSICIAN ASSISTANT

## 2021-12-20 ENCOUNTER — TELEPHONE (OUTPATIENT)
Dept: FAMILY MEDICINE CLINIC | Facility: CLINIC | Age: 51
End: 2021-12-20

## 2022-01-04 ENCOUNTER — TELEPHONE (OUTPATIENT)
Dept: GASTROENTEROLOGY | Facility: CLINIC | Age: 52
End: 2022-01-04

## 2022-01-04 NOTE — TELEPHONE ENCOUNTER
[facility] sent request for refill on Omeprazole 20mg 90 day supply  [facility]

## 2022-01-05 ENCOUNTER — TELEPHONE (OUTPATIENT)
Dept: SURGERY | Facility: CLINIC | Age: 52
End: 2022-01-05

## 2022-01-18 ENCOUNTER — TELEPHONE (OUTPATIENT)
Dept: GASTROENTEROLOGY | Facility: CLINIC | Age: 52
End: 2022-01-18

## 2022-01-25 ENCOUNTER — TELEPHONE (OUTPATIENT)
Dept: SURGERY | Facility: HOSPITAL | Age: 52
End: 2022-01-25

## 2022-01-26 ENCOUNTER — ANESTHESIA EVENT (OUTPATIENT)
Dept: PERIOP | Facility: HOSPITAL | Age: 52
End: 2022-01-26

## 2022-01-26 ENCOUNTER — HOSPITAL ENCOUNTER (OUTPATIENT)
Dept: PERIOP | Facility: HOSPITAL | Age: 52
Setting detail: OUTPATIENT SURGERY
Discharge: HOME | End: 2022-01-26
Attending: INTERNAL MEDICINE | Admitting: INTERNAL MEDICINE
Payer: COMMERCIAL

## 2022-01-26 ENCOUNTER — ANESTHESIA (OUTPATIENT)
Dept: PERIOP | Facility: HOSPITAL | Age: 52
End: 2022-01-26

## 2022-01-26 VITALS
OXYGEN SATURATION: 96 % | SYSTOLIC BLOOD PRESSURE: 118 MMHG | TEMPERATURE: 97 F | DIASTOLIC BLOOD PRESSURE: 70 MMHG | HEART RATE: 78 BPM | HEIGHT: 74 IN | WEIGHT: 227 LBS | BODY MASS INDEX: 29.13 KG/M2 | RESPIRATION RATE: 18 BRPM

## 2022-01-26 DIAGNOSIS — Z12.11 COLON CANCER SCREENING: ICD-10-CM

## 2022-01-26 DIAGNOSIS — R10.11 RIGHT UPPER QUADRANT ABDOMINAL PAIN: ICD-10-CM

## 2022-01-26 PROCEDURE — 43239 EGD BIOPSY SINGLE/MULTIPLE: CPT | Performed by: INTERNAL MEDICINE

## 2022-01-26 PROCEDURE — 88305 TISSUE EXAM BY PATHOLOGIST: CPT | Performed by: PATHOLOGY

## 2022-01-26 PROCEDURE — G0121 COLON CA SCRN NOT HI RSK IND: HCPCS | Performed by: INTERNAL MEDICINE

## 2022-01-26 RX ORDER — SODIUM CHLORIDE, SODIUM LACTATE, POTASSIUM CHLORIDE, CALCIUM CHLORIDE 600; 310; 30; 20 MG/100ML; MG/100ML; MG/100ML; MG/100ML
INJECTION, SOLUTION INTRAVENOUS CONTINUOUS PRN
Status: DISCONTINUED | OUTPATIENT
Start: 2022-01-26 | End: 2022-01-26

## 2022-01-26 RX ORDER — OMEPRAZOLE 20 MG/1
40 CAPSULE, DELAYED RELEASE ORAL DAILY
Qty: 90 CAPSULE | Refills: 4 | Status: SHIPPED | OUTPATIENT
Start: 2022-01-26 | End: 2022-04-06 | Stop reason: SDUPTHER

## 2022-01-26 RX ORDER — SODIUM CHLORIDE, SODIUM LACTATE, POTASSIUM CHLORIDE, CALCIUM CHLORIDE 600; 310; 30; 20 MG/100ML; MG/100ML; MG/100ML; MG/100ML
125 INJECTION, SOLUTION INTRAVENOUS CONTINUOUS
Status: DISCONTINUED | OUTPATIENT
Start: 2022-01-26 | End: 2022-01-30 | Stop reason: HOSPADM

## 2022-01-26 RX ORDER — PROPOFOL 10 MG/ML
INJECTION, EMULSION INTRAVENOUS AS NEEDED
Status: DISCONTINUED | OUTPATIENT
Start: 2022-01-26 | End: 2022-01-26

## 2022-01-26 RX ORDER — SODIUM CHLORIDE, SODIUM LACTATE, POTASSIUM CHLORIDE, CALCIUM CHLORIDE 600; 310; 30; 20 MG/100ML; MG/100ML; MG/100ML; MG/100ML
20 INJECTION, SOLUTION INTRAVENOUS CONTINUOUS
Status: DISCONTINUED | OUTPATIENT
Start: 2022-01-26 | End: 2022-01-30 | Stop reason: HOSPADM

## 2022-01-26 RX ORDER — PROPOFOL 10 MG/ML
INJECTION, EMULSION INTRAVENOUS CONTINUOUS PRN
Status: DISCONTINUED | OUTPATIENT
Start: 2022-01-26 | End: 2022-01-26

## 2022-01-26 RX ORDER — ONDANSETRON 2 MG/ML
4 INJECTION INTRAMUSCULAR; INTRAVENOUS ONCE AS NEEDED
Status: DISCONTINUED | OUTPATIENT
Start: 2022-01-26 | End: 2022-01-30 | Stop reason: HOSPADM

## 2022-01-26 RX ADMIN — SODIUM CHLORIDE, SODIUM LACTATE, POTASSIUM CHLORIDE, AND CALCIUM CHLORIDE: .6; .31; .03; .02 INJECTION, SOLUTION INTRAVENOUS at 10:09

## 2022-01-26 RX ADMIN — PROPOFOL 150 MG: 10 INJECTION, EMULSION INTRAVENOUS at 10:12

## 2022-01-26 RX ADMIN — PROPOFOL 150 MCG/KG/MIN: 10 INJECTION, EMULSION INTRAVENOUS at 10:12

## 2022-01-26 RX ADMIN — SODIUM CHLORIDE, SODIUM LACTATE, POTASSIUM CHLORIDE, AND CALCIUM CHLORIDE 125 ML/HR: .6; .31; .03; .02 INJECTION, SOLUTION INTRAVENOUS at 09:34

## 2022-01-26 NOTE — H&P
History and Physical - [facility]  Nadeem Gomes 49 y o  male MRN: [id number]                  HPI: Nadeem Gomes is a 49y o  year old male who presents for right upper quadrant pain and screening colonoscopy  REVIEW OF SYSTEMS: Per the HPI, and otherwise unremarkable      Historical Information   Past Medical History:   Diagnosis Date    Arthritis     Knees, back    Back pain     cervical,lumbar    DDD (degenerative disc disease), cervical     DDD (degenerative disc disease), lumbar     [place] (hard of hearing)     Slightly    Hyperlipidemia     Previously was on medication but doing better now    Hypertension     Kidney stone     Knee pain, right     R knee scope today 7/1/2019    Migraines     Nocturia      Past Surgical History:   Procedure Laterality Date    APPENDECTOMY      ARTHROSCOPY KNEE Right 7/1/2019    Procedure: ARTHROSCOPY KNEE WITH PARTIAL MEDIAL MENISECTOMY;  Surgeon: Clinton Mooney MD;  Location: [facility];  Service: Orthopedics    CYSTOSCOPY      DENTAL SURGERY      Extractions x2    KNEE ARTHROSCOPY Bilateral     x2 on right; x1 on left    LASIK      [place] CYSTO/URETERO W/LITHOTRIPSY &INDWELL STENT INSRT  12/8/2017    Procedure: CYSTOSCOPY URETEROSCOPY  RETROGRADE PYELOGRAM AND INSERTION STENT URETERAL;  Surgeon: Kodi Claudio MD;  Location: [facility];  Service: Urology    [place] CYSTO/URETERO W/LITHOTRIPSY &INDWELL STENT INSRT Right 1/2/2018    Procedure: CYSTOSCOPY, RIGHT RETROGRADE PYELOGRAM, RIGHT URETEROSCOPY WITH  STONE MANIPULATION AND EXTRACTION, RIGHT DOUBLE J STENT EXCHANGE;  Surgeon: Kodi Claudio MD;  Location:  MAIN OR;  Service: Urology    TONSILLECTOMY      VASECTOMY      WISDOM TOOTH EXTRACTION       Social History   Social History     Substance and Sexual Activity   Alcohol Use No     Social History     Substance and Sexual Activity   Drug Use Not on file     Social History     Tobacco Use   Smoking Status Light Tobacco Smoker  Types: Cigars   Smokeless Tobacco Never Used   Tobacco Comment    3 cigars yearly     Family History   Problem Relation Age of Onset    No Known Problems Mother     Esophageal cancer Father     Throat cancer Father     Prostate cancer Father     No Known Problems Sister        Meds/Allergies       Current Outpatient Medications:     atorvastatin (LIPITOR) 20 mg tablet    Bystolic 5 MG tablet    hydrochlorothiazide (MICROZIDE) 12 5 mg capsule    omeprazole (PriLOSEC) 20 mg delayed release capsule    cyclobenzaprine (FLEXERIL) 10 mg tablet    Rimegepant Sulfate (NURTEC) 75 MG TBDP    Current Facility-Administered Medications:     lactated ringers infusion, 125 mL/hr, Intravenous, Continuous    No Known Allergies    Objective     /83   Pulse 58   Temp (!) 97 1 °F (36 2 °C) (Tympanic)   Resp 18   Ht 6' 2" (1 88 m)   Wt 103 kg (227 lb)   SpO2 94%   BMI 29 15 kg/m²       PHYSICAL EXAM    Gen: NAD  Head: NCAT  CV: RRR  CHEST: Clear  ABD: soft, NT/ND  EXT: no edema      ASSESSMENT/PLAN:  This is a 49y o  year old male here for colonoscopy and endoscopy, and he is stable and optimized for his procedure

## 2022-01-26 NOTE — ANESTHESIA POSTPROCEDURE EVALUATION
Post-Op Assessment Note    CV Status:  Stable  Pain Score: 0    Pain management: adequate     Mental Status:  Alert   Hydration Status:  Stable   PONV Controlled:  None   Airway Patency:  Patent      Post Op Vitals Reviewed: Yes      Staff: CRNA         No complications documented      BP   136/78   Temp (!) (P) 96 7 °F (35 9 °C) (01/26/22 1035)    Pulse  78   Resp   14   SpO2   98

## 2022-01-26 NOTE — ANESTHESIA PREPROCEDURE EVALUATION
Procedure:  COLONOSCOPY  EGD    Relevant Problems   CARDIO   (+) Chronic migraine without aura without status migrainosus, not intractable   (+) Hyperlipidemia   (+) Migraine without aura and without status migrainosus, not intractable   (+) Other secondary hypertension      /RENAL   (+) YVONNE (acute kidney injury) ([address] )   (+) Calculus of kidney   (+) Ureteral stone with hydronephrosis      NEURO/PSYCH   (+) Chronic intractable headache   (+) Chronic migraine without aura without status migrainosus, not intractable   (+) Chronic tension-type headache, not intractable   (+) Migraine without aura and without status migrainosus, not intractable        Physical Exam    Airway    Mallampati score: I  TM Distance: >3 FB  Neck ROM: full     Dental   No notable dental hx     Cardiovascular      Pulmonary      Other Findings        Anesthesia Plan  ASA Score- 2     Anesthesia Type- IV sedation with anesthesia with ASA Monitors  Additional Monitors:   Airway Plan:           Plan Factors-Exercise tolerance (METS): >4 METS  Chart reviewed  Patient summary reviewed  Patient is not a current smoker  Induction- intravenous  Postoperative Plan-     Informed Consent- Anesthetic plan and risks discussed with patient  I personally reviewed this patient with the CRNA  Discussed and agreed on the Anesthesia Plan with the CRNA  Debra Bowser

## 2022-02-14 ENCOUNTER — OFFICE VISIT (OUTPATIENT)
Dept: GASTROENTEROLOGY | Facility: CLINIC | Age: 52
End: 2022-02-14
Payer: COMMERCIAL

## 2022-02-14 VITALS
HEIGHT: 74 IN | BODY MASS INDEX: 30.52 KG/M2 | HEART RATE: 54 BPM | TEMPERATURE: 97.5 F | WEIGHT: 237.8 LBS | SYSTOLIC BLOOD PRESSURE: 136 MMHG | DIASTOLIC BLOOD PRESSURE: 87 MMHG

## 2022-02-14 DIAGNOSIS — R10.31 RIGHT LOWER QUADRANT ABDOMINAL PAIN: Primary | ICD-10-CM

## 2022-02-14 PROCEDURE — 3008F BODY MASS INDEX DOCD: CPT | Performed by: INTERNAL MEDICINE

## 2022-02-14 PROCEDURE — 99213 OFFICE O/P EST LOW 20 MIN: CPT | Performed by: INTERNAL MEDICINE

## 2022-02-14 PROCEDURE — 4004F PT TOBACCO SCREEN RCVD TLK: CPT | Performed by: INTERNAL MEDICINE

## 2022-02-14 NOTE — PROGRESS NOTES
[facility] - Outpatient Follow-up Note  Lizandro Michelle 49 y o  male MRN: [id number]  Encounter: [id number]          ASSESSMENT AND PLAN:      1  Right lower quadrant abdominal pain  This is a 60-year-old white male with a history of right upper quadrant pain and endoscopy which revealed duodenal erosions  He also had a HIDA scan which revealed gallbladder dysfunction  For now I would continue his omeprazole rather than referring him for surgery  If the abdominal pain persists then we should re-evaluate him  Thank you very much for referring this patient   ______________________________________________________________________    SUBJECTIVE:  Vadim Junior returns for follow-up after his endoscopy and colonoscopy  Endoscopy revealed duodenal erosions  I increased his omeprazole to 40 mg daily  He has not seen much improvement but his abdominal pain has improved since he started the omeprazole  REVIEW OF SYSTEMS IS OTHERWISE NEGATIVE        Historical Information   Past Medical History:   Diagnosis Date    Arthritis     Knees, back    Back pain     cervical,lumbar    DDD (degenerative disc disease), cervical     DDD (degenerative disc disease), lumbar     [place] (hard of hearing)     Slightly    Hyperlipidemia     Previously was on medication but doing better now    Hypertension     Kidney stone     Knee pain, right     R knee scope today 7/1/2019    Migraines     Nocturia      Past Surgical History:   Procedure Laterality Date    APPENDECTOMY      ARTHROSCOPY KNEE Right 7/1/2019    Procedure: ARTHROSCOPY KNEE WITH PARTIAL MEDIAL MENISECTOMY;  Surgeon: Radha Huitron MD;  [facility]: [facility] OR;  Service: Orthopedics    CYSTOSCOPY      DENTAL SURGERY      Extractions x2    KNEE ARTHROSCOPY Bilateral     x2 on right; x1 on left    LASIK      [place] CYSTO/URETERO W/LITHOTRIPSY &INDWELL STENT INSRT  12/8/2017    Procedure: CYSTOSCOPY URETEROSCOPY  RETROGRADE PYELOGRAM AND INSERTION STENT URETERAL;  Surgeon: Tyler Molina MD;  Location: [facility];  Service: Urology    [place] CYSTO/URETERO W/LITHOTRIPSY &INDWELL STENT INSRT Right 1/2/2018    Procedure: CYSTOSCOPY, RIGHT RETROGRADE PYELOGRAM, RIGHT URETEROSCOPY WITH  STONE MANIPULATION AND EXTRACTION, RIGHT DOUBLE J Cl[place]ia Bridegroom;  Surgeon: Tyler Molina MD;  Location:  MAIN OR;  Service: Urology    TONSILLECTOMY      VASECTOMY      WISDOM TOOTH EXTRACTION       Social History   Social History     Substance and Sexual Activity   Alcohol Use No     Social History     Substance and Sexual Activity   Drug Use Not on file     Social History     Tobacco Use   Smoking Status Light Tobacco Smoker    Types: Cigars   Smokeless Tobacco Never Used   Tobacco Comment    3 cigars yearly     Family History   Problem Relation Age of Onset    No Known Problems Mother     Esophageal cancer Father     Throat cancer Father     Prostate cancer Father     No Known Problems Sister        Meds/Allergies       Current Outpatient Medications:     atorvastatin (LIPITOR) 20 mg tablet    Bystolic 5 MG tablet    cyclobenzaprine (FLEXERIL) 10 mg tablet    hydrochlorothiazide (MICROZIDE) 12 5 mg capsule    omeprazole (PriLOSEC) 20 mg delayed release capsule    Rimegepant Sulfate (NURTEC) 75 MG TBDP    No Known Allergies        Objective     Blood pressure 136/87, pulse (!) 54, temperature 97 5 °F (36 4 °C), temperature source Tympanic, height 6' 2" (1 88 m), weight 108 kg (237 lb 12 8 oz)  Body mass index is 30 53 kg/m²  PHYSICAL EXAM:      General Appearance:   Alert, cooperative, no distress   HEENT:   Normocephalic, atraumatic, anicteric  Neck:  Supple, symmetrical, trachea midline   Lungs:   Clear to auscultation bilaterally; no rales, rhonchi or wheezing; respirations unlabored    Heart[de-identified]   Regular rate and rhythm; no murmur, rub, or gallop     Abdomen:   Soft, non-tender, non-distended; normal bowel sounds; no masses, no organomegaly Genitalia:   Deferred    Rectal:   Deferred    Extremities:  No cyanosis, clubbing or edema    Pulses:  2+ and symmetric    Skin:  No jaundice, rashes, or lesions    Lymph nodes:  No palpable cervical lymphadenopathy        Lab Results:   No visits with results within 1 Day(s) from this visit  Latest known visit with results is:   Hospital Outpatient Visit on 01/26/2022   Component Date Value    Case Report 01/26/2022                      Value:Surgical Pathology Report                         Case: [id number]                                   Authorizing Provider:  Jess Nye MD        Collected:           01/26/2022 1014              Ordering Location:     [facility] Received:            01/26/2022 1321                                     Operating Room                                                               Pathologist:           Steven Santacruz DO                                                     Specimen:    Stomach, bx cold r o h pylori anterum                                                      Final Diagnosis 01/26/2022                      Value: This result contains rich text formatting which cannot be displayed here   Note 01/26/2022                      Value: This result contains rich text formatting which cannot be displayed here   Additional Information 01/26/2022                      Value: This result contains rich text formatting which cannot be displayed here  [place] Description 01/26/2022                      Value: This result contains rich text formatting which cannot be displayed here  Radiology Results:   EGD    Result Date: 1/26/2022  Narrative: [facility] Operating Room [address] [phone] DATE OF SERVICE: 1/26/22 PHYSICIAN(S): Jess Nye MD Proceduralist INDICATION: Right upper quadrant abdominal pain POST-OP DIAGNOSIS: See the impression below   PREPROCEDURE: Informed consent was obtained for the procedure, including sedation  Risks of perforation, hemorrhage, adverse drug reaction and aspiration were discussed  The patient was placed in the left lateral decubitus position  Patient was explained about the risks and benefits of the procedure  Risks including but not limited to bleeding, infection, and perforation were explained in detail  Also explained about less than 100% sensitivity with the exam and other alternatives  DETAILS OF PROCEDURE: Patient was taken to the procedure room where a time out was performed to confirm correct patient and correct procedure  The patient underwent monitored anesthesia care, which was administered by an anesthesia professional  The patient's blood pressure, heart rate, level of consciousness, respirations and oxygen were monitored throughout the procedure  The scope was advanced to the second part of the duodenum  Retroflexion was performed in the fundus  The patient experienced no blood loss  The procedure was not difficult  The patient tolerated the procedure well  There were no apparent complications   ANESTHESIA INFORMATION: ASA: II Anesthesia Type: IV Sedation with Anesthesia MEDICATIONS: No administrations occurring from 0959 to 1020 on 01/26/22 FINDINGS: Abnormal mucosa with multiple small erosions in the duodenum The esophagus and stomach appeared normal  Performed biopsies to rule out H  pylori in the antrum SPECIMENS: ID Type Source Tests Collected by Time Destination 1 : bx cold r o h pylori anterum Tissue Stomach TISSUE EXAM Ethel Liu MD 1/26/2022 10:14 AM      Impression: Duodenal erosions RECOMMENDATION: Await pathology results  Increase omeprazole to 40 mg daily  Ethel iLu MD     Colonoscopy    Result Date: 1/26/2022  Narrative: [facility] Operating Room [address] [phone] DATE OF SERVICE: 1/26/22 PHYSICIAN(S): Ethel Liu MD Proceduralist INDICATION: Colon cancer screening Colonoscopy performed for a screening indication  POST-OP DIAGNOSIS: See the impression below  HISTORY: Prior colonoscopy: No prior colonoscopy  BOWEL PREPARATION: Miralax/Dulcolax PREPROCEDURE: Informed consent was obtained for the procedure, including sedation  Risks including but not limited to bleeding, infection, perforation, adverse drug reaction and aspiration were explained in detail  Also explained about less than 100% sensitivity with the exam and other alternatives  The patient was placed in the left lateral decubitus position  DETAILS OF PROCEDURE: Patient was taken to the procedure room where a time out was performed to confirm correct patient and correct procedure  The patient underwent monitored anesthesia care, which was administered by an anesthesia professional  The patient's blood pressure, heart rate, level of consciousness, oxygen and respirations were monitored throughout the procedure  A digital rectal exam was performed  The scope was introduced through the anus and advanced to the cecum  Retroflexion was performed in the rectum  The quality of bowel preparation was evaluated using the Rober Bowel Preparation Scale with scores of: right colon = 2, transverse colon = 2, left colon = 2  The total BBPS score was 6  Bowel prep was adequate  The patient experienced no blood loss  The procedure was not difficult  The patient tolerated the procedure well  There were no apparent complications   ANESTHESIA INFORMATION: ASA: II Anesthesia Type: IV Sedation with Anesthesia MEDICATIONS: No administrations occurring from 0959 to 1033 on 01/26/22 FINDINGS: All observed locations appeared normal  EVENTS: Procedure Events Event Event Time ENDO SCOPE OUT TIME 1/26/2022 10:16 AM ENDO CECUM REACHED 1/26/2022 10:24 AM ENDO SCOPE OUT TIME 1/26/2022 10:33 AM SPECIMENS: ID Type Source Tests Collected by Time Destination 1 : bx cold r o h pylori anterum Tissue Stomach TISSUE EXAM Elizabeth Antoine MD 1/26/2022 10:14 AM  EQUIPMENT: Colonoscope -     Impression: Normal colonoscopy RECOMMENDATION: Repeat screening colonoscopy in 10 years     John Mariee MD

## 2022-04-06 DIAGNOSIS — R10.11 RIGHT UPPER QUADRANT ABDOMINAL PAIN: ICD-10-CM

## 2022-04-06 RX ORDER — OMEPRAZOLE 20 MG/1
40 CAPSULE, DELAYED RELEASE ORAL DAILY
Qty: 180 CAPSULE | Refills: 3 | Status: SHIPPED | OUTPATIENT
Start: 2022-04-06 | End: 2023-04-01

## 2022-04-06 NOTE — TELEPHONE ENCOUNTER
Patients GI provider:  Dr Yamila Gutierrez    Number to return call: (  [phone]    Reason for call: Pt calling to get new script for omeprazole 40 mg [facility]    Scheduled procedure/appointment date if applicable: N/A

## 2022-05-23 ENCOUNTER — TELEPHONE (OUTPATIENT)
Dept: NEUROLOGY | Facility: CLINIC | Age: 52
End: 2022-05-23

## 2022-05-23 NOTE — TELEPHONE ENCOUNTER
Called patient and I rescheduled his 06/15/22 appointment with Dr GONZALES to 07/12/22 with Aiden Rogers

## 2022-06-23 DIAGNOSIS — E78.2 MIXED HYPERLIPIDEMIA: ICD-10-CM

## 2022-06-23 DIAGNOSIS — I10 ESSENTIAL HYPERTENSION: ICD-10-CM

## 2022-06-23 DIAGNOSIS — I10 DIASTOLIC HYPERTENSION: ICD-10-CM

## 2022-06-23 RX ORDER — HYDROCHLOROTHIAZIDE 12.5 MG/1
CAPSULE, GELATIN COATED ORAL
Qty: 90 CAPSULE | Refills: 3 | Status: SHIPPED | OUTPATIENT
Start: 2022-06-23

## 2022-06-23 RX ORDER — ATORVASTATIN CALCIUM 20 MG/1
TABLET, FILM COATED ORAL
Qty: 90 TABLET | Refills: 3 | Status: SHIPPED | OUTPATIENT
Start: 2022-06-23

## 2022-06-23 RX ORDER — NEBIVOLOL 5 MG/1
TABLET ORAL
Qty: 90 TABLET | Refills: 3 | Status: SHIPPED | OUTPATIENT
Start: 2022-06-23

## 2022-08-03 ENCOUNTER — OFFICE VISIT (OUTPATIENT)
Dept: NEUROLOGY | Facility: CLINIC | Age: 52
End: 2022-08-03
Payer: COMMERCIAL

## 2022-08-03 VITALS
SYSTOLIC BLOOD PRESSURE: 132 MMHG | BODY MASS INDEX: 30.42 KG/M2 | HEIGHT: 74 IN | HEART RATE: 60 BPM | WEIGHT: 237 LBS | DIASTOLIC BLOOD PRESSURE: 88 MMHG

## 2022-08-03 DIAGNOSIS — G43.709 CHRONIC MIGRAINE WITHOUT AURA WITHOUT STATUS MIGRAINOSUS, NOT INTRACTABLE: ICD-10-CM

## 2022-08-03 DIAGNOSIS — G43.009 MIGRAINE WITHOUT AURA AND WITHOUT STATUS MIGRAINOSUS, NOT INTRACTABLE: ICD-10-CM

## 2022-08-03 DIAGNOSIS — G44.229 CHRONIC TENSION-TYPE HEADACHE, NOT INTRACTABLE: Primary | ICD-10-CM

## 2022-08-03 PROCEDURE — 99214 OFFICE O/P EST MOD 30 MIN: CPT | Performed by: NURSE PRACTITIONER

## 2022-08-03 NOTE — PATIENT INSTRUCTIONS
MRI brain due to headache change, also recommend see opthalmology for dilated eye exam    Recommend starting vitamin b2 200 mg daily-can urine fluorescent yellow and magnesium oxide 400-500 mg daily is a natural laxative, also coQ10    Labs for worsening headaches

## 2022-08-03 NOTE — PROGRESS NOTES
Patient ID: Freddrick Cogan is a 47 y o  male  Assessment/Plan:    Chronic migraine without aura without status migrainosus, not intractable  Patient with increase in headaches recently-likely tension type however does note increase in headache with coughing and sneezing, position change therefore will order MRI brain to assess as well as recommend to see opthalmology for dilated eye exam  Discussed supplementation and labs for worsening headaches in which he will obtain  Nurtec currently helping with migraines which remain under good control  Plan:   -MRI brain due to headache change  -referral to Ophthalmology for dilated eye exam  -Recommend starting vitamin b2 200 mg daily-can urine fluorescent yellow and magnesium oxide 400-500 mg daily is a natural laxative, also coQ10  -continue Nurtec for abortive  -discussed potential use of muscle relaxer however patient declined at this time  -Limit use of NSAID and tylenol for headaches to no more than 3 times a week to prevent rebound headaches, maintain headache diary, maintain adequate fluid intake and appropriate sleep hygiene  Plan for follow up in 3 months  To contact the office sooner with any concerns or worsening symptoms  Diagnoses and all orders for this visit:    Chronic tension-type headache, not intractable    Migraine without aura and without status migrainosus, not intractable  -     MRI brain without contrast; Future  -     Vitamin D 25 hydroxy; Future  -     Ambulatory Referral to Ophthalmology; Future  -     Vitamin B12; Future  -     TSH, 3rd generation with Free T4 reflex; Future  -     Rimegepant Sulfate (NURTEC) 75 MG TBDP; 1 tab at migraine onset  No more than one dose per 24 hours  Hold triptan  Chronic migraine without aura without status migrainosus, not intractable           Subjective:    HPI    Patient is a 47year old male who presents for follow up for migraine headaches      last office visit 12/2021 in which no medication changes were made-he was on nurtec prn  Interval History:  Noted over the last 6 months He has a headache about 4 days a week in which he rates mild-moderate  5-7/10, frontal, squeezing, no specific trigger for these currently, moderate headaches last the rest of the day  If he takes tylenol or excedrin will only last a few hours-takes about 3x/week  No pattern to time of onset  He does have chronic neck pain  He does have cyclobenazprine which he does utilize for neck and back pain occasionally, does cause fatigue  No migrainous features  No recent eye exam  He does not feel he sleep well only 5-6 hours due to back pain  These headaches do tend to worsen when he lays down, he can feel a pressure in his head when sneezing and cough, no change when he bends over     Migraines occur about twice a month, He utilizes nurtec seems to not be working as well  Feels has been using more over the past 6 months  Seems to only take the edge off  Can sometimes have nausea with the headaches  He believes he did have covid in the fall of 2021 however tested negative  Current Medications:  Nurtec    prior headache hx:  Frequency:2x/week initially  Description: neck tightness sensation, tension type, vertex of head  Triggers: perfume,   Change in temperature such as walking from warm to cold air from [facility]  Or vice versa, talking   severe headaches description:Left sided throbbing more severe, with nausea dry heaving has to lay down- once a month  Worse with exertion  Medications tried: preventative-HCTZ and bystolic for BP  abortive- two excedrin migraine and nap would help dull the headache, nurtec, tylenol    He does have hx of kidney stones requiring stent    CT head/neck 6/2021: No acute intracranial disease  No large vessel flow restrictive disease within the head or neck  MRI brain 9/2020: Unremarkable MRI of the brain         The following portions of the patient's history were reviewed and updated as appropriate: allergies, current medications, past family history, past medical history, past social history, past surgical history and problem list        Objective:    Blood pressure 132/88, pulse 60, height 6' 2" (1 88 m), weight 108 kg (237 lb)  Physical Exam    Neurological Exam    I have personally reviewed the ROS performed by the MA     ROS:    Review of Systems   Constitutional: Negative  Negative for appetite change and fever  HENT: Negative  Negative for hearing loss, tinnitus, trouble swallowing and voice change  Eyes: Negative  Negative for photophobia and pain  Respiratory: Negative  Negative for shortness of breath  Cardiovascular: Negative  Negative for palpitations  Gastrointestinal: Negative  Negative for nausea and vomiting  Endocrine: Negative  Negative for cold intolerance  Genitourinary: Negative  Negative for dysuria, frequency and urgency  Musculoskeletal: Negative  Negative for myalgias and neck pain  Skin: Negative  Negative for rash  Neurological: Positive for headaches  Negative for dizziness, tremors, seizures, syncope, facial asymmetry, speech difficulty, weakness, light-headedness and numbness  Patient states migraines once a month   Hematological: Negative  Does not bruise/bleed easily  Psychiatric/Behavioral: Negative  Negative for confusion, hallucinations and sleep disturbance

## 2022-08-05 NOTE — ASSESSMENT & PLAN NOTE
Patient with increase in headaches recently-likely tension type however does note increase in headache with coughing and sneezing, position change therefore will order MRI brain to assess as well as recommend to see opthalmology for dilated eye exam  Discussed supplementation and labs for worsening headaches in which he will obtain  Nurtec currently helping with migraines which remain under good control  Plan:   -MRI brain due to headache change  -referral to Ophthalmology for dilated eye exam  -Recommend starting vitamin b2 200 mg daily-can urine fluorescent yellow and magnesium oxide 400-500 mg daily is a natural laxative, also coQ10  -continue Nurtec for abortive  -discussed potential use of muscle relaxer however patient declined at this time  -Limit use of NSAID and tylenol for headaches to no more than 3 times a week to prevent rebound headaches, maintain headache diary, maintain adequate fluid intake and appropriate sleep hygiene  Plan for follow up in 3 months  To contact the office sooner with any concerns or worsening symptoms

## 2022-08-22 ENCOUNTER — HOSPITAL ENCOUNTER (OUTPATIENT)
Dept: MRI IMAGING | Facility: HOSPITAL | Age: 52
Discharge: HOME | End: 2022-08-22
Payer: COMMERCIAL

## 2022-08-22 DIAGNOSIS — G43.009 MIGRAINE WITHOUT AURA AND WITHOUT STATUS MIGRAINOSUS, NOT INTRACTABLE: ICD-10-CM

## 2022-08-22 PROCEDURE — 70551 MRI BRAIN STEM W/O DYE: CPT

## 2022-08-22 PROCEDURE — G1004 CDSM NDSC: HCPCS

## 2022-10-19 ENCOUNTER — VBI (OUTPATIENT)
Dept: ADMINISTRATIVE | Facility: OTHER | Age: 52
End: 2022-10-19

## 2022-11-01 ENCOUNTER — NEW PATIENT (OUTPATIENT)
Dept: URBAN - METROPOLITAN AREA CLINIC 6 | Facility: CLINIC | Age: 52
End: 2022-11-01

## 2022-11-01 DIAGNOSIS — G43.109: ICD-10-CM

## 2022-11-01 PROCEDURE — 92083 EXTENDED VISUAL FIELD XM: CPT

## 2022-11-01 PROCEDURE — 99204 OFFICE O/P NEW MOD 45 MIN: CPT

## 2022-11-01 ASSESSMENT — TONOMETRY
OD_IOP_MMHG: 14
OS_IOP_MMHG: 13

## 2022-11-01 ASSESSMENT — VISUAL ACUITY
OS_SC: 20/25
OU_SC: J2
OD_SC: 20/30

## 2022-11-22 ENCOUNTER — OFFICE VISIT (OUTPATIENT)
Dept: NEUROLOGY | Facility: CLINIC | Age: 52
End: 2022-11-22

## 2022-11-22 VITALS
SYSTOLIC BLOOD PRESSURE: 110 MMHG | HEIGHT: 74 IN | BODY MASS INDEX: 30.29 KG/M2 | WEIGHT: 236 LBS | DIASTOLIC BLOOD PRESSURE: 80 MMHG

## 2022-11-22 DIAGNOSIS — G43.009 MIGRAINE WITHOUT AURA AND WITHOUT STATUS MIGRAINOSUS, NOT INTRACTABLE: Primary | ICD-10-CM

## 2022-11-22 DIAGNOSIS — G44.229 CHRONIC TENSION-TYPE HEADACHE, NOT INTRACTABLE: ICD-10-CM

## 2022-11-22 RX ORDER — INDOMETHACIN 25 MG/1
CAPSULE ORAL
Qty: 10 CAPSULE | Refills: 0 | Status: SHIPPED | OUTPATIENT
Start: 2022-11-22

## 2022-11-22 NOTE — PROGRESS NOTES
Patient ID: Geovanna Carrington is a 47 y o  male  Assessment/Plan:    Migraine without aura and without status migrainosus, not intractable  Patient's migraines currently under good control, does utilize nurtec as abortive which is effective  He continues with  tension type headaches a few times a week with no clear trigger other then noting after intercourse  We did discuss taking medication prophylactic for this which he was agreeable too  We did discuss considering amitriptyline to help better control tension type headaches, however he did not want to make any adjustments today  He may consider in the future if headaches worsen  Plan-  -continue nurtec as needed for headaches  -Recommend starting vitamin b2 200 mg daily-can urine fluorescent yellow and magnesium oxide 400-500 mg daily is a natural laxative, also coQ10  -take indomethacin 25 mg 30-60 mins prior to intercourse for headache prevention    Plan for follow up in 1 year  To contact the office sooner with any concerns or worsening symptoms  Diagnoses and all orders for this visit:    Migraine without aura and without status migrainosus, not intractable  -     TSH, 3rd generation; Future  -     Vitamin B12; Future  -     Vitamin D 25 hydroxy; Future  -     indomethacin (INDOCIN) 25 mg capsule; Take 30 mins prior to activity to prevent headaches    Chronic tension-type headache, not intractable           Subjective:    HPI  Patient is a 47year old male who presents for follow up for migraine headaches  last office visit 8/2022 in which MRI brain was ordered due to change/increase in headaches, he was referred to optho as well  Interval History:  nterval History:    1  Migraine   Left-temporal with left eye pain  Throbbing/pulsing   Can be debilitating   Nausea   Vision changes   1/month     Would take nurtec with these and relax and they would go away     2   Tension-type   Band- across forehead    No vision changes  About 3/week   Can go up to 3/10 to 6/10   No real triggers for his tension headaches, but will tend to get one often times after intercourse   Will start to notice about half hour later, then will go to sleep and wake up with an achey headache   Has also noticed them after exercise, but will resolve after conditioning   When he is able to he takes acetaminophen ahead of time, which can knock it out of the way  1-2/week     Has tried Excedrin migraine in the past and will still reach for it about every 1-2 months   Knows his feels pretty well     Has tried food diary   But not much has been working   Works in administration in a school   Admits to not much exercise      He did have recent eye exam at [facility] that was normal     Current Medications:  Nurtec     prior headache hx:  Frequency:2x/week initially  Description: neck tightness sensation, tension type, vertex of head  Triggers: perfume,   Change in temperature such as walking from warm to cold air from [employer] Or vice versa, talking   severe headaches description:Left sided throbbing more severe, with nausea dry heaving has to lay down- once a month  Worse with exertion  Medications tried: preventative-HCTZ and bystolic for BP  abortive- two excedrin migraine and nap would help dull the headache, nurtec, tylenol     He does have hx of kidney stones requiring stent     CT head/neck 6/2021: No acute intracranial disease  No large vessel flow restrictive disease within the head or neck  MRI brain 9/2020: Unremarkable MRI of the brain  MRI brain 8/2022: Normal   No change          The following portions of the patient's history were reviewed and updated as appropriate: allergies, current medications, past family history, past medical history, past social history, past surgical history and problem list        Objective:    Blood pressure 110/80, height 6' 2" (1 88 m), weight 107 kg (236 lb)  Physical Exam  Constitutional:       General: He is awake  HENT:      Right Ear: Hearing normal       Left Ear: Hearing normal    Eyes:      General: Lids are normal       Extraocular Movements: Extraocular movements intact  Pupils: Pupils are equal, round, and reactive to light  Neurological:      Mental Status: He is alert  Motor: Motor strength is normal       Deep Tendon Reflexes:      Reflex Scores:       Bicep reflexes are 2+ on the right side and 2+ on the left side  Brachioradialis reflexes are 2+ on the right side and 2+ on the left side  Patellar reflexes are 2+ on the right side and 2+ on the left side  Achilles reflexes are 2+ on the right side and 2+ on the left side  Psychiatric:         Speech: Speech normal          Neurological Exam  Mental Status  Awake and alert  Oriented to person, place, time and situation  Speech is normal  Language is fluent with no aphasia  Cranial Nerves  CN III, IV, VI: Extraocular movements intact bilaterally  Normal lids and orbits bilaterally  Pupils equal round and reactive to light bilaterally  CN V:  Right: Facial sensation is normal   Left: Facial sensation is normal on the left  CN VII:  Right: There is no facial weakness  Left: There is no facial weakness  CN VIII:  Right: Hearing is normal   Left: Hearing is normal   CN XI:  Right: Trapezius strength is normal   Left: Trapezius strength is normal   CN XII: Tongue midline without atrophy or fasciculations  Motor   Strength is 5/5 throughout all four extremities  Sensory  Light touch is normal in upper and lower extremities  Temperature is normal in upper and lower extremities       Reflexes                                            Right                      Left  Brachioradialis                    2+                         2+  Biceps                                 2+                         2+  Patellar                                2+                         2+  Achilles                                2+ 2+    Coordination  Right: Finger-to-nose normal Left: Finger-to-nose normal     Gait  Casual gait is normal including stance, stride, and arm swing  Able to rise from chair without using arms  I have personally reviewed the ROS performed by the MA     ROS:    Review of Systems   Constitutional: Negative  Negative for appetite change and fever  HENT: Negative  Negative for hearing loss, tinnitus, trouble swallowing and voice change  Eyes: Negative  Negative for photophobia, pain and visual disturbance  Respiratory: Negative  Negative for shortness of breath  Cardiovascular: Negative  Negative for palpitations  Gastrointestinal: Negative  Negative for nausea and vomiting  Endocrine: Negative  Negative for cold intolerance  Genitourinary: Negative  Negative for dysuria, frequency and urgency  Musculoskeletal: Negative  Negative for gait problem, myalgias and neck pain  Skin: Negative  Negative for rash  Allergic/Immunologic: Negative  Neurological: Positive for headaches (frequent headaches)  Negative for dizziness, tremors, seizures, syncope, facial asymmetry, speech difficulty, weakness, light-headedness and numbness  Hematological: Negative  Does not bruise/bleed easily  Psychiatric/Behavioral: Negative  Negative for confusion, hallucinations and sleep disturbance  All other systems reviewed and are negative

## 2022-11-23 NOTE — ASSESSMENT & PLAN NOTE
Patient's migraines currently under good control, does utilize nurtec as abortive which is effective  He continues with  tension type headaches a few times a week with no clear trigger other then noting after intercourse  We did discuss taking medication prophylactic for this which he was agreeable too  We did discuss considering amitriptyline to help better control tension type headaches, however he did not want to make any adjustments today  He may consider in the future if headaches worsen  Plan-  -continue nurtec as needed for headaches  -Recommend starting vitamin b2 200 mg daily-can urine fluorescent yellow and magnesium oxide 400-500 mg daily is a natural laxative, also coQ10  -take indomethacin 25 mg 30-60 mins prior to intercourse for headache prevention    Plan for follow up in 1 year  To contact the office sooner with any concerns or worsening symptoms

## 2022-11-30 ENCOUNTER — APPOINTMENT (OUTPATIENT)
Dept: LAB | Facility: MEDICAL CENTER | Age: 52
End: 2022-11-30

## 2022-11-30 DIAGNOSIS — G43.009 MIGRAINE WITHOUT AURA AND WITHOUT STATUS MIGRAINOSUS, NOT INTRACTABLE: ICD-10-CM

## 2022-11-30 LAB — 25(OH)D3 SERPL-MCNC: 22.4 NG/ML (ref 30–100)

## 2022-12-01 LAB
TSH SERPL DL<=0.05 MIU/L-ACNC: 4 UIU/ML (ref 0.45–4.5)
VIT B12 SERPL-MCNC: 490 PG/ML (ref 100–900)

## 2022-12-02 ENCOUNTER — TELEPHONE (OUTPATIENT)
Dept: NEUROLOGY | Facility: CLINIC | Age: 52
End: 2022-12-02

## 2022-12-02 NOTE — TELEPHONE ENCOUNTER
----- Message from Derrick Dawn [place] sent at 12/2/2022  2:13 PM EST -----  Labs are all normal except vitamin d is low, recommend OTC vitamin d3 2000 IU daily

## 2022-12-05 NOTE — TELEPHONE ENCOUNTER
Left another voicemail regarding CB  2 nd attempt  Sending patient My Chart message with Lab results and Damaris's recommendations

## 2023-03-03 ENCOUNTER — TELEPHONE (OUTPATIENT)
Dept: FAMILY MEDICINE CLINIC | Facility: CLINIC | Age: 53
End: 2023-03-03

## 2023-03-07 NOTE — TELEPHONE ENCOUNTER
Pt called back asking if  was able to put together the list of providers with phone numbers, as discussed on Friday

## 2023-06-29 DIAGNOSIS — E78.2 MIXED HYPERLIPIDEMIA: ICD-10-CM

## 2023-06-29 DIAGNOSIS — I10 DIASTOLIC HYPERTENSION: ICD-10-CM

## 2023-06-29 DIAGNOSIS — I10 ESSENTIAL HYPERTENSION: ICD-10-CM

## 2023-06-29 RX ORDER — HYDROCHLOROTHIAZIDE 12.5 MG/1
CAPSULE, GELATIN COATED ORAL
Qty: 90 CAPSULE | Refills: 3 | Status: SHIPPED | OUTPATIENT
Start: 2023-06-29

## 2023-06-29 RX ORDER — ATORVASTATIN CALCIUM 20 MG/1
TABLET, FILM COATED ORAL
Qty: 90 TABLET | Refills: 3 | Status: SHIPPED | OUTPATIENT
Start: 2023-06-29

## 2023-06-29 RX ORDER — NEBIVOLOL 5 MG/1
TABLET ORAL
Qty: 90 TABLET | Refills: 3 | Status: SHIPPED | OUTPATIENT
Start: 2023-06-29

## 2023-08-07 DIAGNOSIS — G43.009 MIGRAINE WITHOUT AURA AND WITHOUT STATUS MIGRAINOSUS, NOT INTRACTABLE: ICD-10-CM

## 2023-08-07 DIAGNOSIS — S13.9XXA CERVICAL SPRAIN, INITIAL ENCOUNTER: ICD-10-CM

## 2023-08-07 RX ORDER — CYCLOBENZAPRINE HCL 10 MG
10 TABLET ORAL 3 TIMES DAILY PRN
Qty: 15 TABLET | Refills: 0 | Status: SHIPPED | OUTPATIENT
Start: 2023-08-07

## 2023-08-08 RX ORDER — INDOMETHACIN 25 MG/1
CAPSULE ORAL
Qty: 10 CAPSULE | Refills: 2 | Status: SHIPPED | OUTPATIENT
Start: 2023-08-08

## 2023-08-28 ENCOUNTER — OFFICE VISIT (OUTPATIENT)
Dept: URGENT CARE | Facility: MEDICAL CENTER | Age: 53
End: 2023-08-28
Payer: COMMERCIAL

## 2023-08-28 VITALS
HEART RATE: 52 BPM | WEIGHT: 225 LBS | BODY MASS INDEX: 28.88 KG/M2 | RESPIRATION RATE: 20 BRPM | HEIGHT: 74 IN | TEMPERATURE: 98 F | OXYGEN SATURATION: 98 %

## 2023-08-28 DIAGNOSIS — R22.1 NECK SWELLING: Primary | ICD-10-CM

## 2023-08-28 PROCEDURE — 99213 OFFICE O/P EST LOW 20 MIN: CPT | Performed by: STUDENT IN AN ORGANIZED HEALTH CARE EDUCATION/TRAINING PROGRAM

## 2023-08-28 NOTE — PROGRESS NOTES
[facility]        NAME: Erika Friedman is a 47 y.o. male  : 1970    MRN: [id number]  DATE: 2023  TIME: 11:45 AM    Assessment and Plan   Neck swelling [R22.1]  1. Neck swelling          Patient presenting with left-sided neck pain with unremarkable exam with no concern for abscess or mass, no lymphadenopathy, no limitation in range of motion. Afebrile. No concern for a acute etiology at this time such as abscess, tonsillitis, mass in the neck. I have instructed patient to follow-up with PCP in 3 to 5 days for reevaluation. I do not see need for further work-up at this time immediately. Provided warning signs of developing mass or infection. Patient Instructions       Follow up with PCP in 3-5 days. Proceed to  ER if symptoms worsen. Chief Complaint     Chief Complaint   Patient presents with   • Neck Swelling     Left sided nec swelling, denies pain when swallowing, tenderness with palpation and moving neck, pt. Noticed swelling to neck last evening          History of Present Illness       HPI     Pt presents to clinic due to onset of left-sided neck pain starting yesterday evening. He denies any fever, chills, sore throat, cough, trouble swallowing, painful swallowing, limitation in range of motion. He reports reproduction of pain on side bending to the right. Review of Systems   Review of Systems   Constitutional: Negative for chills and fever. HENT: Negative for ear pain, rhinorrhea, sinus pressure, sinus pain, sore throat, trouble swallowing and voice change. Eyes: Negative for pain and visual disturbance. Respiratory: Negative for cough and shortness of breath. Cardiovascular: Negative for chest pain and palpitations. Gastrointestinal: Negative for abdominal pain and vomiting. Genitourinary: Negative for dysuria and hematuria. Musculoskeletal: Positive for neck pain. Negative for arthralgias and back pain.    Skin: Negative for color change and rash.   Neurological: Negative for seizures and syncope. All other systems reviewed and are negative. Current Medications       Current Outpatient Medications:   •  atorvastatin (LIPITOR) 20 mg tablet, TAKE 1 TABLET DAILY, Disp: 90 tablet, Rfl: 3  •  cyclobenzaprine (FLEXERIL) 10 mg tablet, Take 1 tablet (10 mg total) by mouth 3 (three) times a day as needed for muscle spasms, Disp: 15 tablet, Rfl: 0  •  hydrochlorothiazide (MICROZIDE) 12.5 mg capsule, TAKE 1 CAPSULE EVERY MORNING, Disp: 90 capsule, Rfl: 3  •  indomethacin (INDOCIN) 25 mg capsule, Take 30 mins prior to activity to prevent headaches, Disp: 10 capsule, Rfl: 2  •  nebivolol (BYSTOLIC) 5 mg tablet, TAKE 1 TABLET DAILY, Disp: 90 tablet, Rfl: 3  •  Rimegepant Sulfate (NURTEC) 75 MG TBDP, 1 tab at migraine onset. No more than one dose per 24 hours.  Hold triptan., Disp: 8 tablet, Rfl: 2  •  cetirizine (ZyrTEC) 10 mg tablet, Take 1 tablet (10 mg total) by mouth daily, Disp: 30 tablet, Rfl: 1  •  fluticasone (FLONASE) 50 mcg/act nasal spray, 2 sprays into each nostril daily, Disp: 18.2 mL, Rfl: 0  •  omeprazole (PriLOSEC) 20 mg delayed release capsule, TAKE 2 CAPSULES DAILY, Disp: 60 capsule, Rfl: 0    Current Allergies     Allergies as of 08/28/2023   • (No Known Allergies)            The following portions of the patient's history were reviewed and updated as appropriate: allergies, current medications, past family history, past medical history, past social history, past surgical history and problem list.     Past Medical History:   Diagnosis Date   • Arthritis     Knees, back   • Back pain     cervical,lumbar   • DDD (degenerative disc disease), cervical    • DDD (degenerative disc disease), lumbar    • [place] (hard of hearing)     Slightly   • Hyperlipidemia     Previously was on medication but doing better now   • Hypertension    • Kidney stone    • Knee pain, right     R knee scope today 7/1/2019   • Migraines    • Nocturia        Past Surgical History:   Procedure Laterality Date   • APPENDECTOMY     • ARTHROSCOPY KNEE Right 7/1/2019    Procedure: ARTHROSCOPY KNEE WITH PARTIAL MEDIAL MENISECTOMY;  Surgeon: Shahram Whittaker MD;  Location: [facility];  Service: Orthopedics   • CYSTOSCOPY     • DENTAL SURGERY      Extractions x2   • KNEE ARTHROSCOPY Bilateral     x2 on right; x1 on left   • LASIK     • VA CYSTO/URETERO W/LITHOTRIPSY &INDWELL STENT INSRT  12/8/2017    Procedure: CYSTOSCOPY URETEROSCOPY  RETROGRADE PYELOGRAM AND INSERTION STENT URETERAL;  Surgeon: Joline Primrose, MD;  Location: [facility];  Service: Urology   • VA CYSTO/URETERO W/LITHOTRIPSY &INDWELL STENT INSRT Right 1/2/2018    Procedure: CYSTOSCOPY, RIGHT RETROGRADE PYELOGRAM, RIGHT URETEROSCOPY WITH  STONE MANIPULATION AND EXTRACTION, RIGHT DOUBLE J STENT EXCHANGE;  Surgeon: Joline Primrose, MD;  Location:  MAIN OR;  Service: Urology   • TONSILLECTOMY     • VASECTOMY     • WISDOM TOOTH EXTRACTION         Family History   Problem Relation Age of Onset   • No Known Problems Mother    • Esophageal cancer Father    • Throat cancer Father    • Prostate cancer Father    • No Known Problems Sister          Medications have been verified. Objective   Pulse (!) 52   Temp 98 °F (36.7 °C)   Resp 20   Ht 6' 2" (1.88 m)   Wt 102 kg (225 lb)   SpO2 98%   BMI 28.89 kg/m²        Physical Exam     Physical Exam  Constitutional:       Appearance: Normal appearance. HENT:      Head: Normocephalic and atraumatic. Right Ear: External ear normal.      Left Ear: External ear normal.      Nose: Nose normal.      Mouth/Throat:      Mouth: Mucous membranes are moist.      Pharynx: Oropharynx is clear. Uvula midline. No pharyngeal swelling, oropharyngeal exudate, posterior oropharyngeal erythema or uvula swelling. Tonsils: No tonsillar exudate or tonsillar abscesses. 0 on the right. 0 on the left. Eyes:      General: No scleral icterus. Right eye: No discharge.          Left eye: No discharge. Extraocular Movements: Extraocular movements intact. Conjunctiva/sclera: Conjunctivae normal.   Neck:      Thyroid: No thyroid mass or thyromegaly. Trachea: Trachea and phonation normal.     Cardiovascular:      Rate and Rhythm: Normal rate. Pulmonary:      Effort: Pulmonary effort is normal. No respiratory distress. Musculoskeletal:         General: Normal range of motion. Cervical back: Normal range of motion and neck supple. No rigidity or torticollis. Normal range of motion. Lymphadenopathy:      Cervical: No cervical adenopathy. Right cervical: No superficial, deep or posterior cervical adenopathy. Left cervical: No superficial, deep or posterior cervical adenopathy. Skin:     General: Skin is warm and dry. Neurological:      General: No focal deficit present. Mental Status: He is alert and oriented to person, place, and time.    Psychiatric:         Mood and Affect: Mood normal.         Behavior: Behavior normal.

## 2023-11-14 ENCOUNTER — TELEPHONE (OUTPATIENT)
Dept: NEUROLOGY | Facility: CLINIC | Age: 53
End: 2023-11-14

## 2023-11-14 NOTE — TELEPHONE ENCOUNTER
LMOM to confirm pt's appt w/ Lissa Gan in [facility] on 11/21/23 at 12:45. Advised pt to arrive 15 minutes prior to check in w/ the .

## 2023-11-21 ENCOUNTER — OFFICE VISIT (OUTPATIENT)
Dept: NEUROLOGY | Facility: CLINIC | Age: 53
End: 2023-11-21
Payer: COMMERCIAL

## 2023-11-21 VITALS
WEIGHT: 235.7 LBS | OXYGEN SATURATION: 95 % | BODY MASS INDEX: 30.25 KG/M2 | DIASTOLIC BLOOD PRESSURE: 80 MMHG | HEIGHT: 74 IN | TEMPERATURE: 98.6 F | SYSTOLIC BLOOD PRESSURE: 126 MMHG | HEART RATE: 52 BPM

## 2023-11-21 DIAGNOSIS — G43.009 MIGRAINE WITHOUT AURA AND WITHOUT STATUS MIGRAINOSUS, NOT INTRACTABLE: ICD-10-CM

## 2023-11-21 PROCEDURE — 99214 OFFICE O/P EST MOD 30 MIN: CPT | Performed by: NURSE PRACTITIONER

## 2023-11-21 RX ORDER — INDOMETHACIN 25 MG/1
CAPSULE ORAL
Qty: 30 CAPSULE | Refills: 1 | Status: SHIPPED | OUTPATIENT
Start: 2023-11-21

## 2023-11-21 NOTE — PATIENT INSTRUCTIONS
Recommend starting vitamin b2 200 mg daily-can urine fluorescent yellow and magnesium oxide 400-500 mg daily is a natural laxative. CoQ10    At onset of headache take nurtec, can taken w/w/o ibuprofen     If you need medication for nausea please contact office    Please make sure you are on vitamin d supplement d3 2000 IU daily    Limit use of NSAID and tylenol for headaches to no more than 3 times a week to prevent rebound headaches, maintain headache diary, maintain adequate fluid intake and appropriate sleep hygiene.

## 2023-11-21 NOTE — ASSESSMENT & PLAN NOTE
Patient's migraines under reasonable control, 1-2 migraines per month however has several tension type headaches per week. Nurtec is effective as abortive. He sonja any new symptoms with headaches. He does utilize indomethacin for post coital headache which has been effective as well. To assist with headaches did discuss considering TCA, however patient would like to trial supplements first.     Plan-  -start b2, magnesium, CoQ10 supplements  -continue nurtec as needed for headaches, may also utilize ibuprofen, tylenol, Excedrin  -take indomethacin 25 mg 30-60 mins prior to intercourse for headache prevention  -Limit use of NSAID and tylenol for headaches to no more than 3 times a week to prevent rebound headaches, maintain headache diary, maintain adequate fluid intake and appropriate sleep hygiene. Plan for follow up in 1 year. To contact the office sooner with any concerns or worsening symptoms.

## 2023-11-21 NOTE — PROGRESS NOTES
Patient ID: Cherylene Brunner is a 47 y.o. male. Assessment/Plan:    Migraine without aura and without status migrainosus, not intractable  Patient's migraines under reasonable control, 1-2 migraines per month however has several tension type headaches per week. Nurtec is effective as abortive. He sonja any new symptoms with headaches. He does utilize indomethacin for post coital headache which has been effective as well. To assist with headaches did discuss considering TCA, however patient would like to trial supplements first.     Plan-  -start b2, magnesium, CoQ10 supplements  -continue nurtec as needed for headaches, may also utilize ibuprofen, tylenol, Excedrin  -take indomethacin 25 mg 30-60 mins prior to intercourse for headache prevention  -Limit use of NSAID and tylenol for headaches to no more than 3 times a week to prevent rebound headaches, maintain headache diary, maintain adequate fluid intake and appropriate sleep hygiene. Plan for follow up in 1 year. To contact the office sooner with any concerns or worsening symptoms. Diagnoses and all orders for this visit:    Migraine without aura and without status migrainosus, not intractable  -     indomethacin (INDOCIN) 25 mg capsule; Take 30 mins prior to activity to prevent headaches           Subjective:    HPI    Patient is a 47year old male who presents for follow up for migraine headaches. last office visit 11/2022 in which he was started on indometachin. Interval History:   He continues to have about 1-2 migraines per month. Takes nurtec and naps and is resolved, lasts several hours. Some nausea with migraines at times. Left-temporal with left eye pain  Throbbing/pulsing   No new symptoms with headaches. He has found indomethacin helpful for post coital headache    Continues with tension type headaches 3x/week. Band- across forehead  rates 3-6/10  He notes that cold air, skipped meals, can trigger headaches recently. These last for several hours, sometimes into the next day. Thinks he drinks less water then he should. He did have recent eye exam at [facility] that was normal.     Current Medications:  Nurtec     prior headache hx:  Frequency:2x/week initially  Description: neck tightness sensation, tension type, vertex of head  Triggers: perfume,   Change in temperature such as walking from warm to cold air from [facility]  Or vice versa, talking   severe headaches description:Left sided throbbing more severe, with nausea dry heaving has to lay down- once a month  Worse with exertion  Medications tried: preventative-HCTZ and bystolic for BP  abortive- two excedrin migraine and nap would help dull the headache, nurtec, tylenol     He does have hx of kidney stones requiring stent     CT head/neck 6/2021: No acute intracranial disease. No large vessel flow restrictive disease within the head or neck. MRI brain 9/2020: Unremarkable MRI of the brain. MRI brain 8/2022: Normal.  No change       The following portions of the patient's history were reviewed and updated as appropriate: allergies, current medications, past family history, past medical history, past social history, past surgical history and problem list.          Objective:    Blood pressure 126/80, pulse (!) 52, temperature 98.6 °F (37 °C), temperature source Temporal, height 6' 2" (1.88 m), weight 107 kg (235 lb 11.2 oz), SpO2 95 %. Physical Exam  Constitutional:       General: He is awake. HENT:      Right Ear: Hearing normal.      Left Ear: Hearing normal.   Eyes:      General: Lids are normal.      Extraocular Movements: Extraocular movements intact. Pupils: Pupils are equal, round, and reactive to light. Neurological:      Mental Status: He is alert. Motor: Motor strength is normal.     Deep Tendon Reflexes:      Reflex Scores:       Bicep reflexes are 2+ on the right side and 2+ on the left side.        Brachioradialis reflexes are 2+ on the right side and 2+ on the left side. Patellar reflexes are 2+ on the right side and 2+ on the left side. Achilles reflexes are 2+ on the right side and 2+ on the left side. Psychiatric:         Speech: Speech normal.         Neurological Exam  Mental Status  Awake and alert. Oriented to person, place, time and situation. Speech is normal. Language is fluent with no aphasia. Cranial Nerves  CN III, IV, VI: Extraocular movements intact bilaterally. Normal lids and orbits bilaterally. Pupils equal round and reactive to light bilaterally. CN V:  Right: Facial sensation is normal.  Left: Facial sensation is normal on the left. CN VII:  Right: There is no facial weakness. Left: There is no facial weakness. CN VIII:  Right: Hearing is normal.  Left: Hearing is normal.  CN XI:  Right: Trapezius strength is normal.  Left: Trapezius strength is normal.  CN XII: Tongue midline without atrophy or fasciculations. Motor   Strength is 5/5 throughout all four extremities. Sensory  Light touch is normal in upper and lower extremities. Temperature is normal in upper and lower extremities. Reflexes                                            Right                      Left  Brachioradialis                    2+                         2+  Biceps                                 2+                         2+  Patellar                                2+                         2+  Achilles                                2+                         2+    Coordination  Right: Finger-to-nose normal.Left: Finger-to-nose normal.    Gait  Casual gait is normal including stance, stride, and arm swing. Able to rise from chair without using arms. I have personally reviewed the ROS performed by the MA.    ROS:    Review of Systems   Constitutional:  Negative for appetite change, fatigue and fever. HENT: Negative. Negative for hearing loss, tinnitus, trouble swallowing and voice change. Eyes: Negative. Negative for photophobia, pain and visual disturbance. Respiratory: Negative. Negative for shortness of breath. Cardiovascular: Negative. Negative for palpitations. Gastrointestinal:  Positive for nausea. Negative for vomiting. Endocrine: Negative. Negative for cold intolerance. Genitourinary: Negative. Negative for dysuria, frequency and urgency. Musculoskeletal:  Negative for back pain, gait problem, myalgias and neck pain. Skin: Negative. Negative for rash. Allergic/Immunologic: Negative. Neurological:  Positive for dizziness (when pt stands up too fast), light-headedness and headaches (same  since last visit). Negative for tremors, seizures, syncope, facial asymmetry, speech difficulty, weakness and numbness. Hematological: Negative. Does not bruise/bleed easily. Psychiatric/Behavioral: Negative. Negative for confusion, hallucinations and sleep disturbance. All other systems reviewed and are negative.

## 2023-12-05 DIAGNOSIS — G43.009 MIGRAINE WITHOUT AURA AND WITHOUT STATUS MIGRAINOSUS, NOT INTRACTABLE: ICD-10-CM

## 2024-06-25 DIAGNOSIS — I10 ESSENTIAL HYPERTENSION: ICD-10-CM

## 2024-06-25 DIAGNOSIS — E78.2 MIXED HYPERLIPIDEMIA: ICD-10-CM

## 2024-06-25 DIAGNOSIS — I10 DIASTOLIC HYPERTENSION: ICD-10-CM

## 2024-06-26 RX ORDER — ATORVASTATIN CALCIUM 20 MG/1
TABLET, FILM COATED ORAL
Qty: 90 TABLET | Refills: 0 | Status: SHIPPED | OUTPATIENT
Start: 2024-06-26

## 2024-06-26 RX ORDER — NEBIVOLOL 5 MG/1
TABLET ORAL
Qty: 90 TABLET | Refills: 0 | Status: SHIPPED | OUTPATIENT
Start: 2024-06-26

## 2024-06-26 RX ORDER — HYDROCHLOROTHIAZIDE 12.5 MG/1
CAPSULE, GELATIN COATED ORAL
Qty: 90 CAPSULE | Refills: 0 | Status: SHIPPED | OUTPATIENT
Start: 2024-06-26

## 2024-07-25 DIAGNOSIS — R10.11 RIGHT UPPER QUADRANT ABDOMINAL PAIN: Primary | ICD-10-CM

## 2024-07-25 RX ORDER — OMEPRAZOLE 40 MG/1
40 CAPSULE, DELAYED RELEASE ORAL
Qty: 30 CAPSULE | Refills: 0 | Status: SHIPPED | OUTPATIENT
Start: 2024-07-25

## 2024-07-26 ENCOUNTER — TELEPHONE (OUTPATIENT)
Dept: GASTROENTEROLOGY | Facility: MEDICAL CENTER | Age: 54
End: 2024-07-26

## 2024-07-26 NOTE — TELEPHONE ENCOUNTER
Diana M Jaiyeola, MD   to Gastroenterology [facility]     7/25/24  4:09 PM  Please schedule an office visit with a PA at [facility] prior to additional refills  Diana M Jaiyeola, MD   to Jorge Britton         7/25/24  4:09 PM  Jaun Patel,     I have sent a 30 day refill to your pharmacy. We should see you in our office prior to additional refills since you have not had an office visit in over a year. Our office will contact you to arrange an appointment.     Dr. Jaiyeola  [facility]  [phone]    Last read by Jorge Britton at  5:39 PM on 7/25/2024.

## 2024-08-29 ENCOUNTER — OFFICE VISIT (OUTPATIENT)
Dept: GASTROENTEROLOGY | Facility: CLINIC | Age: 54
End: 2024-08-29
Payer: COMMERCIAL

## 2024-08-29 VITALS
DIASTOLIC BLOOD PRESSURE: 91 MMHG | WEIGHT: 224.8 LBS | OXYGEN SATURATION: 96 % | SYSTOLIC BLOOD PRESSURE: 144 MMHG | HEART RATE: 52 BPM | TEMPERATURE: 97.7 F | BODY MASS INDEX: 28.85 KG/M2 | HEIGHT: 74 IN

## 2024-08-29 DIAGNOSIS — Z12.11 SCREENING FOR COLON CANCER: ICD-10-CM

## 2024-08-29 DIAGNOSIS — K21.9 GASTROESOPHAGEAL REFLUX DISEASE WITHOUT ESOPHAGITIS: Primary | ICD-10-CM

## 2024-08-29 DIAGNOSIS — Z51.81 MEDICATION MONITORING ENCOUNTER: ICD-10-CM

## 2024-08-29 DIAGNOSIS — D18.03 LIVER HEMANGIOMA: ICD-10-CM

## 2024-08-29 DIAGNOSIS — K26.9 DUODENAL EROSION: ICD-10-CM

## 2024-08-29 DIAGNOSIS — R94.8 ABNORMAL BILIARY HIDA SCAN: ICD-10-CM

## 2024-08-29 PROCEDURE — 99214 OFFICE O/P EST MOD 30 MIN: CPT | Performed by: PHYSICIAN ASSISTANT

## 2024-08-29 NOTE — PATIENT INSTRUCTIONS
Lets try you on the lower dose of omeprazole, try 20 mg tablets every morning.  Small frequent meals and diet and lifestyle modifications for GERD.  Avoid NSAIDs as much as you are able.  Blood work at your convenience.  If you have recurrence of abdominal discomfort or reflux, we can always bump you back up to omeprazole 40 mg.  If you have recurrence of abdominal discomfort despite the omeprazole, I think it might be reasonable then to consider going back to the general surgeon.    You would not need another screening colonoscopy until 2032.

## 2024-08-29 NOTE — PROGRESS NOTES
[facility] - Outpatient Follow-up Note  Robe Britton 54 y.o. male MRN: [id number]  Encounter: [id number]    ASSESSMENT AND PLAN:      1. Gastroesophageal reflux disease without esophagitis  2. Duodenal erosion  3. Abnormal biliary HIDA scan  4. Medication monitoring encounter    Patient had a history of right upper quadrant pain, he had a EGD in 2022 which demonstrated duodenal erosions.  He also had HIDA scan which demonstrated a low ejection fraction.  He was started on PPI and his symptoms abated.    Patient symptoms remain quiescent.  We did review trial of a taper of PPI at this time to try omeprazole 20 mg daily.  Work on small frequent meals and diet and lifestyle modifications for GERD.  Check for celiac disease given duodenal erosions and no bx obtained.  If any alarm features should occur to the upper GI tract, would need to consider repeat EGD.  If right upper quadrant pain recurs despite therapeutic doses of PPI, would refer back to surgery.  Given long-term use of PPI, check routine blood work including for nutritional deficiencies.     - omeprazole (PriLOSEC) 20 mg delayed release capsule; Take 1 capsule (20 mg total) by mouth daily  Dispense: 90 capsule; Refill: 0  - CBC and differential; Future  - Vitamin B12; Future  - Magnesium; Future  - IgA; Future  - Tissue transglutaminase, IgA; Future    5. Screening for colon cancer    Colonoscopy in 01/2021 with no polyps removed.   No family hx of colon cancer.   Repeat in 10 years for screening purposes, which would take him to 01/2031, sooner if clinically indicated.   Recommend high fiber diet and excellent hydration.     6. Liver hemangioma     Last identified on imaging from 10/2021.  No concerning features.  Continue to monitor conservatively at this time.  Patient asymptomatic.    We will follow up in 1 year to reassess symptoms.   ______________________________________________________________________    SUBJECTIVE:  Patient is a 54 y.o. male who presents today for follow-up regarding abdominal pain.  Past medical history significant for HTN, migraine, history of kidney stones, HLD. Hx of appendectomy.     Pt is new to me.  He was last evaluated by GI in 2022.at that time, he was experiencing right upper quadrant pain and he had an upper endoscopy which demonstrated duodenal erosions.  He had a HIDA scan which commented on gallbladder dysfunction.  He was treated with omeprazole and his abdominal pain resolved, therefore he was not evaluated by general surgery.     08/29/24:     He is taking PPI daily. No abd pain. No heartburn, indigestion, nausea, emesis. No dysphagia or odynophagia. No weight loss over the past 6 months. He does endorse a peculiar symptom of strange odor and taste in mouth at times. Doesn't have seasonal allergies or post-nasal drip. Can last for several hours when this occurs. Not typically triggered by oral intake.  Father passed from esophageal cancer.     Pt denies constipation or diarrhea. No BRBPR or melena. No abd pain or rectal pain related to defecation. No nocturnal BM. No family hx of CRC.    NSAIDs: PRN  Etoh: none  Tobacco: none  Caffeine: 1-2 cups daily      10/2021: MRI: Target hyperechoic lesion in the posterior right hepatic lobe on recent ultrasound is a benign hemangioma.  The lesion is smaller than it appeared sonographically measuring 4.1 cm in the long axis.  In retrospect, this is unchanged from 2017.  No suspicious hepatic masses or acute findings in the visualized abdomen  11/2021: HIDA EF 31%    Endoscopic history:   EGD: 01/2022:  Abnormal mucosa with multiple small erosions in the duodenum  A. Stomach, Antrum, Biopsy: Antral-type gastric mucosa with mild chronic inactive gastritis. Negative for H. pylori organisms on H&E stain.  Colon: 01/2022: normal    Review of Systems   Constitutional:  Negative for fever.   HENT:  Negative for trouble swallowing.    Gastrointestinal:  Positive  for abdominal pain. Negative for constipation, diarrhea, nausea and vomiting.   Genitourinary:  Positive for frequency. Negative for dysuria and hematuria.   Musculoskeletal:  Positive for arthralgias and myalgias.   Neurological:  Positive for headaches.   Otherwise Per HPI    Historical Information   Past Medical History:   Diagnosis Date    Arthritis     Knees, back    Back pain     cervical,lumbar    DDD (degenerative disc disease), cervical     DDD (degenerative disc disease), lumbar     [place] (hard of hearing)     Slightly    Hyperlipidemia     Previously was on medication but doing better now    Hypertension     Kidney stone     Knee pain, right     R knee scope today 7/1/2019    Migraines     Nocturia      Past Surgical History:   Procedure Laterality Date    APPENDECTOMY      ARTHROSCOPY KNEE Right 7/1/2019    Procedure: ARTHROSCOPY KNEE WITH PARTIAL MEDIAL MENISECTOMY;  Surgeon: Orlin Gonsalez MD;  Location: [facility];  Service: Orthopedics    CYSTOSCOPY      DENTAL SURGERY      Extractions x2    KNEE ARTHROSCOPY Bilateral     x2 on right; x1 on left    LASIK      CT CYSTO/URETERO W/LITHOTRIPSY &INDWELL STENT INSRT  12/8/2017    Procedure: CYSTOSCOPY URETEROSCOPY  RETROGRADE PYELOGRAM AND INSERTION STENT URETERAL;  Surgeon: Eligio Younger MD;  Location: [facility];  Service: Urology    CT CYSTO/URETERO W/LITHOTRIPSY &INDWELL STENT INSRT Right 1/2/2018    Procedure: CYSTOSCOPY, RIGHT RETROGRADE PYELOGRAM, RIGHT URETEROSCOPY WITH  STONE MANIPULATION AND EXTRACTION, RIGHT DOUBLE J STENT EXCHANGE;  Surgeon: Eligio Younger MD;  Location:  MAIN OR;  Service: Urology    TONSILLECTOMY      VASECTOMY      WISDOM TOOTH EXTRACTION       Social History   Social History     Substance and Sexual Activity   Alcohol Use No     Social History     Substance and Sexual Activity   Drug Use Never     Social History     Tobacco Use   Smoking Status Former    Types: Cigars    Passive exposure: Never   Smokeless Tobacco Never  "  Tobacco Comments    3 cigars yearly     Family History   Problem Relation Age of Onset    No Known Problems Mother     Esophageal cancer Father     Throat cancer Father     Prostate cancer Father     No Known Problems Sister      Meds/Allergies       Current Outpatient Medications:     atorvastatin (LIPITOR) 20 mg tablet    cyclobenzaprine (FLEXERIL) 10 mg tablet    hydroCHLOROthiazide 12.5 mg capsule    indomethacin (INDOCIN) 25 mg capsule    nebivolol (BYSTOLIC) 5 mg tablet    omeprazole (PriLOSEC) 40 MG capsule    rimegepant sulfate (NURTEC) 75 mg TBDP    cetirizine (ZyrTEC) 10 mg tablet    fluticasone (FLONASE) 50 mcg/act nasal spray    No Known Allergies    Objective     Blood pressure 144/91, pulse (!) 52, temperature 97.7 °F (36.5 °C), temperature source Temporal, height 6' 2\" (1.88 m), weight 102 kg (224 lb 12.8 oz), SpO2 96%. Body mass index is 28.86 kg/m².    Physical Exam  Vitals and nursing note reviewed.   Constitutional:       General: He is not in acute distress.     Appearance: He is well-developed.   HENT:      Head: Normocephalic and atraumatic.   Eyes:      General: No scleral icterus.     Conjunctiva/sclera: Conjunctivae normal.   Cardiovascular:      Rate and Rhythm: Normal rate.      Heart sounds: No murmur heard.  Pulmonary:      Effort: Pulmonary effort is normal. No respiratory distress.      Breath sounds: Normal breath sounds.   Abdominal:      General: Bowel sounds are normal. There is no distension.      Palpations: Abdomen is soft.      Tenderness: There is no abdominal tenderness. There is no guarding.   Musculoskeletal:         General: No swelling.   Skin:     General: Skin is warm and dry.      Coloration: Skin is not jaundiced.   Neurological:      General: No focal deficit present.      Mental Status: He is alert.   Psychiatric:         Mood and Affect: Mood normal.       Lab Results:   No visits with results within 1 Day(s) from this visit.   Latest known visit with results is: "   Office Visit on 04/21/2023   Component Date Value     RAPID STREP A 04/21/2023 Negative     Throat Culture 04/21/2023 Negative for beta-hemolytic Streptococcus      Radiology Results:   No results found.    Ranjana Jenkins PA-C    **Please note:  Dictation voice to text software may have been used in the creation of this record.  Occasional wrong word or “sound alike” substitutions may have occurred due to the inherent limitations of voice recognition software.  Read the chart carefully and recognize, using context, where substitutions have occurred.**

## 2024-09-12 ENCOUNTER — TELEPHONE (OUTPATIENT)
Age: 54
End: 2024-09-12

## 2024-09-12 DIAGNOSIS — L23.7 POISON IVY DERMATITIS: ICD-10-CM

## 2024-09-12 DIAGNOSIS — L23.7 POISON IVY DERMATITIS: Primary | ICD-10-CM

## 2024-09-12 RX ORDER — METHYLPREDNISOLONE 4 MG
TABLET, DOSE PACK ORAL
Qty: 21 EACH | Refills: 0 | Status: SHIPPED | OUTPATIENT
Start: 2024-09-12

## 2024-09-12 RX ORDER — MOMETASONE FUROATE 1 MG/G
CREAM TOPICAL DAILY
Qty: 45 G | Refills: 1 | Status: SHIPPED | OUTPATIENT
Start: 2024-09-12 | End: 2024-09-12 | Stop reason: SDUPTHER

## 2024-09-12 RX ORDER — METHYLPREDNISOLONE 4 MG
TABLET, DOSE PACK ORAL
Qty: 21 EACH | Refills: 0 | Status: SHIPPED | OUTPATIENT
Start: 2024-09-12 | End: 2024-09-12 | Stop reason: SDUPTHER

## 2024-09-12 RX ORDER — MOMETASONE FUROATE 1 MG/G
CREAM TOPICAL DAILY
Qty: 45 G | Refills: 1 | Status: SHIPPED | OUTPATIENT
Start: 2024-09-12

## 2024-09-12 NOTE — TELEPHONE ENCOUNTER
Pt c/o poison ivy right arm.  Pt asking for a topical medication and or steroid.  Asking for prescription to [facility], [place].    Pt did not want to come in for an appointment unless necessary    Please advise

## 2024-09-18 DIAGNOSIS — E78.2 MIXED HYPERLIPIDEMIA: ICD-10-CM

## 2024-09-18 DIAGNOSIS — I10 ESSENTIAL HYPERTENSION: ICD-10-CM

## 2024-09-18 DIAGNOSIS — I10 DIASTOLIC HYPERTENSION: ICD-10-CM

## 2024-09-19 DIAGNOSIS — E78.2 MIXED HYPERLIPIDEMIA: Primary | ICD-10-CM

## 2024-09-23 RX ORDER — ATORVASTATIN CALCIUM 20 MG/1
TABLET, FILM COATED ORAL
Qty: 90 TABLET | Refills: 3 | Status: SHIPPED | OUTPATIENT
Start: 2024-09-23

## 2024-09-23 RX ORDER — NEBIVOLOL 5 MG/1
TABLET ORAL
Qty: 90 TABLET | Refills: 3 | Status: SHIPPED | OUTPATIENT
Start: 2024-09-23

## 2024-09-23 RX ORDER — HYDROCHLOROTHIAZIDE 12.5 MG/1
CAPSULE ORAL
Qty: 90 CAPSULE | Refills: 3 | Status: SHIPPED | OUTPATIENT
Start: 2024-09-23

## 2024-09-27 ENCOUNTER — TELEPHONE (OUTPATIENT)
Dept: FAMILY MEDICINE CLINIC | Facility: CLINIC | Age: 54
End: 2024-09-27

## 2024-09-27 NOTE — TELEPHONE ENCOUNTER
I called patient and scheduled appt on 10/16/24 with you. He was asking for blood work to be done prior to appt. You did order CMP and Lipid. He has been feeling sluggisth. Is there any other blood work you would like him to have? Thank you.

## 2024-09-30 DIAGNOSIS — M62.81 MUSCLE WEAKNESS: Primary | ICD-10-CM

## 2024-09-30 DIAGNOSIS — R53.82 CHRONIC FATIGUE: ICD-10-CM

## 2024-10-30 ENCOUNTER — RA CDI HCC (OUTPATIENT)
Dept: OTHER | Facility: HOSPITAL | Age: 54
End: 2024-10-30

## 2024-10-30 PROBLEM — N17.9 AKI (ACUTE KIDNEY INJURY) (HCC): Status: RESOLVED | Noted: 2017-12-08 | Resolved: 2024-10-30

## 2024-11-04 ENCOUNTER — APPOINTMENT (OUTPATIENT)
Dept: LAB | Facility: MEDICAL CENTER | Age: 54
End: 2024-11-04
Payer: COMMERCIAL

## 2024-11-04 ENCOUNTER — TELEPHONE (OUTPATIENT)
Dept: FAMILY MEDICINE CLINIC | Facility: CLINIC | Age: 54
End: 2024-11-04

## 2024-11-04 DIAGNOSIS — M62.81 MUSCLE WEAKNESS: ICD-10-CM

## 2024-11-04 DIAGNOSIS — R53.82 CHRONIC FATIGUE: ICD-10-CM

## 2024-11-04 DIAGNOSIS — Z51.81 MEDICATION MONITORING ENCOUNTER: ICD-10-CM

## 2024-11-04 DIAGNOSIS — K21.9 GASTROESOPHAGEAL REFLUX DISEASE WITHOUT ESOPHAGITIS: ICD-10-CM

## 2024-11-04 LAB
25(OH)D3 SERPL-MCNC: 34 NG/ML (ref 30–100)
ALBUMIN SERPL BCG-MCNC: 4.2 G/DL (ref 3.5–5)
ALP SERPL-CCNC: 55 U/L (ref 34–104)
ALT SERPL W P-5'-P-CCNC: 26 U/L (ref 7–52)
ANION GAP SERPL CALCULATED.3IONS-SCNC: 7 MMOL/L (ref 4–13)
AST SERPL W P-5'-P-CCNC: 23 U/L (ref 13–39)
BASOPHILS # BLD AUTO: 0.04 THOUSANDS/ÂΜL (ref 0–0.1)
BASOPHILS NFR BLD AUTO: 1 % (ref 0–1)
BILIRUB SERPL-MCNC: 0.49 MG/DL (ref 0.2–1)
BUN SERPL-MCNC: 21 MG/DL (ref 5–25)
CALCIUM SERPL-MCNC: 9 MG/DL (ref 8.4–10.2)
CHLORIDE SERPL-SCNC: 103 MMOL/L (ref 96–108)
CHOLEST SERPL-MCNC: 166 MG/DL
CO2 SERPL-SCNC: 33 MMOL/L (ref 21–32)
CREAT SERPL-MCNC: 1.26 MG/DL (ref 0.6–1.3)
EOSINOPHIL # BLD AUTO: 0.07 THOUSAND/ÂΜL (ref 0–0.61)
EOSINOPHIL NFR BLD AUTO: 1 % (ref 0–6)
ERYTHROCYTE [DISTWIDTH] IN BLOOD BY AUTOMATED COUNT: 13.1 % (ref 11.6–15.1)
GFR SERPL CREATININE-BSD FRML MDRD: 64 ML/MIN/1.73SQ M
GLUCOSE SERPL-MCNC: 97 MG/DL (ref 65–140)
HCT VFR BLD AUTO: 45.5 % (ref 36.5–49.3)
HDLC SERPL-MCNC: 41 MG/DL
HGB BLD-MCNC: 15.2 G/DL (ref 12–17)
IGA SERPL-MCNC: 75 MG/DL (ref 66–433)
IMM GRANULOCYTES # BLD AUTO: 0.02 THOUSAND/UL (ref 0–0.2)
IMM GRANULOCYTES NFR BLD AUTO: 0 % (ref 0–2)
LDLC SERPL CALC-MCNC: 70 MG/DL (ref 0–100)
LYMPHOCYTES # BLD AUTO: 2.03 THOUSANDS/ÂΜL (ref 0.6–4.47)
LYMPHOCYTES NFR BLD AUTO: 35 % (ref 14–44)
MAGNESIUM SERPL-MCNC: 1.9 MG/DL (ref 1.9–2.7)
MCH RBC QN AUTO: 30.4 PG (ref 26.8–34.3)
MCHC RBC AUTO-ENTMCNC: 33.4 G/DL (ref 31.4–37.4)
MCV RBC AUTO: 91 FL (ref 82–98)
MONOCYTES # BLD AUTO: 0.49 THOUSAND/ÂΜL (ref 0.17–1.22)
MONOCYTES NFR BLD AUTO: 8 % (ref 4–12)
NEUTROPHILS # BLD AUTO: 3.21 THOUSANDS/ÂΜL (ref 1.85–7.62)
NEUTS SEG NFR BLD AUTO: 55 % (ref 43–75)
NONHDLC SERPL-MCNC: 125 MG/DL
NRBC BLD AUTO-RTO: 0 /100 WBCS
POTASSIUM SERPL-SCNC: 4.3 MMOL/L (ref 3.5–5.3)
PROT SERPL-MCNC: 6.6 G/DL (ref 6.4–8.4)
RBC # BLD AUTO: 5 MILLION/UL (ref 3.88–5.62)
SODIUM SERPL-SCNC: 143 MMOL/L (ref 135–147)
TRIGL SERPL-MCNC: 275 MG/DL
TSH SERPL DL<=0.05 MIU/L-ACNC: 4.18 UIU/ML (ref 0.45–4.5)
TTG IGA SER-ACNC: <0.5 U/ML
TTG IGA SER-ACNC: NEGATIVE
VIT B12 SERPL-MCNC: 460 PG/ML (ref 180–914)
WBC # BLD AUTO: 5.86 THOUSAND/UL (ref 4.31–10.16)

## 2024-11-04 PROCEDURE — 84443 ASSAY THYROID STIM HORMONE: CPT

## 2024-11-04 PROCEDURE — 82784 ASSAY IGA/IGD/IGG/IGM EACH: CPT

## 2024-11-04 PROCEDURE — 83735 ASSAY OF MAGNESIUM: CPT

## 2024-11-04 PROCEDURE — 82607 VITAMIN B-12: CPT

## 2024-11-04 PROCEDURE — 85025 COMPLETE CBC W/AUTO DIFF WBC: CPT

## 2024-11-04 PROCEDURE — 82306 VITAMIN D 25 HYDROXY: CPT

## 2024-11-04 PROCEDURE — 86364 TISS TRNSGLTMNASE EA IG CLAS: CPT

## 2024-11-04 NOTE — TELEPHONE ENCOUNTER
Called patient to reschedule 11/5/24 appt but did not get vm box.  Sent myc msg, asking to come in today (11/4/24) at 4 pm or tomorrow (11/5/24) at 4:20 pm. If he cannot make either of these appts, he will have to be rescheduled to a different day.  I did request patient to call and ask to speak with the office.

## 2024-11-05 ENCOUNTER — OFFICE VISIT (OUTPATIENT)
Dept: FAMILY MEDICINE CLINIC | Facility: CLINIC | Age: 54
End: 2024-11-05
Payer: COMMERCIAL

## 2024-11-05 VITALS
OXYGEN SATURATION: 98 % | HEIGHT: 74 IN | HEART RATE: 68 BPM | DIASTOLIC BLOOD PRESSURE: 92 MMHG | BODY MASS INDEX: 29.77 KG/M2 | WEIGHT: 232 LBS | TEMPERATURE: 96.8 F | SYSTOLIC BLOOD PRESSURE: 138 MMHG

## 2024-11-05 DIAGNOSIS — H91.93 HEARING DEFICIT, BILATERAL: ICD-10-CM

## 2024-11-05 DIAGNOSIS — R53.82 CHRONIC FATIGUE: ICD-10-CM

## 2024-11-05 DIAGNOSIS — M25.552 CHRONIC LEFT HIP PAIN: ICD-10-CM

## 2024-11-05 DIAGNOSIS — G89.29 CHRONIC LEFT HIP PAIN: ICD-10-CM

## 2024-11-05 DIAGNOSIS — Z80.42 FAMILY HISTORY OF PROSTATE CANCER IN FATHER: ICD-10-CM

## 2024-11-05 DIAGNOSIS — R41.3 MEMORY DYSFUNCTION: ICD-10-CM

## 2024-11-05 DIAGNOSIS — M75.42 IMPINGEMENT SYNDROME OF BOTH SHOULDERS: ICD-10-CM

## 2024-11-05 DIAGNOSIS — M75.41 IMPINGEMENT SYNDROME OF BOTH SHOULDERS: ICD-10-CM

## 2024-11-05 DIAGNOSIS — Z00.00 ANNUAL PHYSICAL EXAM: Primary | ICD-10-CM

## 2024-11-05 PROCEDURE — 99214 OFFICE O/P EST MOD 30 MIN: CPT | Performed by: FAMILY MEDICINE

## 2024-11-05 NOTE — PROGRESS NOTES
Adult Annual Physical  Name: Robe Britton      : 1970      MRN: [id number]  Encounter Provider: Usman Gilliland DO  Encounter Date: 2024   Encounter department: [facility]    Assessment & Plan  Annual physical exam         Impingement syndrome of both shoulders    Orders:    XR shoulder 2+ vw right; Future    XR shoulder 2+ vw left; Future    Ambulatory Referral to Orthopedic Surgery; Future    Chronic fatigue    Orders:    Testosterone, Free and Weakly Bound    Family history of prostate cancer in father    Orders:    PSA, Total Screen; Future    Chronic left hip pain    Orders:    XR hip/pelv 4+ vw left if performed; Future    Memory dysfunction    Orders:    Vitamin D 25 hydroxy; Future    Vitamin B12; Future    Vitamin B6; Future    Folate; Future    C-reactive protein; Future    Heavy metals screen, urine; Future    Hearing deficit, bilateral         Immunizations and preventive care screenings were discussed with patient today. Appropriate education was printed on patient's after visit summary.    Discussed risks and benefits of prostate cancer screening. We discussed the controversial history of PSA screening for prostate cancer in the [place] as well as the risk of over detection and over treatment of prostate cancer by way of PSA screening.  The patient understands that PSA blood testing is an imperfect way to screen for prostate cancer and that elevated PSA levels in the blood may also be caused by infection, inflammation, prostatic trauma or manipulation, urological procedures, or by benign prostatic enlargement.    The role of the digital rectal examination in prostate cancer screening was also discussed and I discussed with him that there is large interobserver variability in the findings of digital rectal examination.    Counseling:  Alcohol/drug use: discussed moderation in alcohol intake, the recommendations for healthy alcohol use, and avoidance of illicit drug  use.  Dental Health: discussed importance of regular tooth brushing, flossing, and dental visits.  Injury prevention: discussed safety/seat belts, safety helmets, smoke detectors, carbon monoxide detectors, and smoking near bedding or upholstery.  Sexual health: discussed sexually transmitted diseases, partner selection, use of condoms, avoidance of unintended pregnancy, and contraceptive alternatives.  Exercise: the importance of regular exercise/physical activity was discussed. Recommend exercise 3-5 times per week for at least 30 minutes.       Depression Screening and Follow-up Plan: Patient was screened for depression during today's encounter. They screened negative with a PHQ-2 score of 0.        History of Present Illness     Adult Annual Physical:  Patient presents for annual physical.     Diet and Physical Activity:  - Diet/Nutrition: well balanced diet, heart healthy (low sodium) diet and consuming 3-5 servings of fruits/vegetables daily.  - Exercise: walking and 5-7 times a week on average.    Depression Screening:  - PHQ-2 Score: 0    General Health:  - Sleep: 7-8 hours of sleep on average.  - Hearing: normal hearing bilateral ears.  - Vision: goes for regular eye exams.  - Dental: brushes teeth twice daily.    /GYN Health:    - History of STDs: no     Health:  - History of STDs: no.     Advanced Care Planning:  - Has an advanced directive?: no    - Has a durable medical POA?: no    - ACP document given to patient?: no      Review of Systems   Constitutional:  Negative for activity change, appetite change, diaphoresis, fatigue and fever.   HENT: Negative.  Negative for dental problem.    Eyes: Negative.  Negative for visual disturbance.   Respiratory:  Negative for apnea, cough, chest tightness, shortness of breath and wheezing.    Cardiovascular:  Negative for chest pain, palpitations and leg swelling.   Gastrointestinal:  Negative for abdominal distention, abdominal pain, anal bleeding,  constipation, diarrhea, nausea and vomiting.        Patient had recent GI evaluation and had an upper and lower endoscopy   Endocrine: Negative for cold intolerance, heat intolerance, polydipsia, polyphagia and polyuria.   Genitourinary:  Negative for difficulty urinating, dysuria, flank pain, hematuria and urgency.        Family history of prostate cancer in father patient needs an annual PSA   Musculoskeletal:  Positive for arthralgias. Negative for back pain, gait problem, joint swelling and myalgias.        Patient having problems with both shoulders he has difficulty abducting the shoulders and he feels like he has pins-and-needles being stuck in his shoulders when he tries to abduct them   Skin:  Negative for color change, rash and wound.   Allergic/Immunologic: Negative for environmental allergies, food allergies and immunocompromised state.   Neurological:  Positive for headaches. Negative for dizziness, seizures, syncope, speech difficulty and numbness.   Hematological:  Negative for adenopathy. Does not bruise/bleed easily.   Psychiatric/Behavioral:  Negative for agitation, behavioral problems, hallucinations, sleep disturbance and suicidal ideas.      Current Outpatient Medications on File Prior to Visit   Medication Sig Dispense Refill    atorvastatin (LIPITOR) 20 mg tablet TAKE 1 TABLET DAILY 90 tablet 3    Cholecalciferol (VITAMIN D3 PO) Take by mouth in the morning      cyclobenzaprine (FLEXERIL) 10 mg tablet Take 1 tablet (10 mg total) by mouth 3 (three) times a day as needed for muscle spasms 15 tablet 0    hydroCHLOROthiazide 12.5 mg capsule TAKE 1 CAPSULE EVERY MORNING 90 capsule 3    indomethacin (INDOCIN) 25 mg capsule Take 30 mins prior to activity to prevent headaches 30 capsule 1    Multiple Vitamins-Minerals (ZINC PO) Take 250 mg by mouth in the morning      nebivolol (BYSTOLIC) 5 mg tablet TAKE 1 TABLET DAILY 90 tablet 3    omeprazole (PriLOSEC) 20 mg delayed release capsule Take 1 capsule  "(20 mg total) by mouth daily 90 capsule 0    RIBOFLAVIN PO Take by mouth in the morning      rimegepant sulfate (NURTEC) 75 mg TBDP 1 tab at migraine onset. No more than one dose per 24 hours. Hold triptan. 8 tablet 0    [DISCONTINUED] methylPREDNISolone 4 MG tablet therapy pack Use as directed on package (Patient not taking: Reported on 11/5/2024) 21 each 0    [DISCONTINUED] mometasone (ELOCON) 0.1 % cream Apply topically daily (Patient not taking: Reported on 11/5/2024) 45 g 1     No current facility-administered medications on file prior to visit.      Social History     Tobacco Use    Smoking status: Former     Types: Cigars     Passive exposure: Never    Smokeless tobacco: Never    Tobacco comments:     3 cigars yearly   Vaping Use    Vaping status: Never Used   Substance and Sexual Activity    Alcohol use: No    Drug use: No    Sexual activity: Yes     Partners: Female     Birth control/protection: Male Sterilization       Objective     /92 (BP Location: Right arm, Patient Position: Sitting, Cuff Size: Large)   Pulse 68   Temp (!) 96.8 °F (36 °C) (Temporal)   Ht 6' 2\" (1.88 m)   Wt 105 kg (232 lb)   SpO2 98%   BMI 29.79 kg/m²     Physical Exam  Constitutional:       Appearance: He is well-developed.   HENT:      Head: Normocephalic and atraumatic.      Right Ear: External ear normal.      Left Ear: External ear normal.      Nose: Nose normal.   Eyes:      Conjunctiva/sclera: Conjunctivae normal.      Pupils: Pupils are equal, round, and reactive to light.   Cardiovascular:      Rate and Rhythm: Normal rate and regular rhythm.      Heart sounds: Normal heart sounds. No murmur heard.     No friction rub.   Pulmonary:      Effort: Pulmonary effort is normal. No respiratory distress.      Breath sounds: Normal breath sounds. No wheezing or rales.   Chest:      Chest wall: No tenderness.   Abdominal:      General: Bowel sounds are normal.      Palpations: Abdomen is soft.   Musculoskeletal:         " General: Normal range of motion.      Cervical back: Normal range of motion and neck supple.   Skin:     General: Skin is warm and dry.      Capillary Refill: Capillary refill takes 2 to 3 seconds.   Neurological:      Mental Status: He is alert and oriented to person, place, and time.   Psychiatric:         Behavior: Behavior normal.         Thought Content: Thought content normal.         Judgment: Judgment normal.

## 2024-11-12 ENCOUNTER — APPOINTMENT (OUTPATIENT)
Dept: RADIOLOGY | Facility: MEDICAL CENTER | Age: 54
End: 2024-11-12
Payer: COMMERCIAL

## 2024-11-12 ENCOUNTER — APPOINTMENT (OUTPATIENT)
Dept: LAB | Facility: MEDICAL CENTER | Age: 54
End: 2024-11-12
Payer: COMMERCIAL

## 2024-11-12 DIAGNOSIS — Z80.42 FAMILY HISTORY OF PROSTATE CANCER IN FATHER: ICD-10-CM

## 2024-11-12 DIAGNOSIS — G89.29 CHRONIC LEFT HIP PAIN: ICD-10-CM

## 2024-11-12 DIAGNOSIS — R41.3 MEMORY DYSFUNCTION: ICD-10-CM

## 2024-11-12 DIAGNOSIS — M25.552 CHRONIC LEFT HIP PAIN: ICD-10-CM

## 2024-11-12 DIAGNOSIS — M75.42 IMPINGEMENT SYNDROME OF BOTH SHOULDERS: ICD-10-CM

## 2024-11-12 DIAGNOSIS — M75.41 IMPINGEMENT SYNDROME OF BOTH SHOULDERS: ICD-10-CM

## 2024-11-12 LAB
25(OH)D3 SERPL-MCNC: 28.1 NG/ML (ref 30–100)
CRP SERPL QL: 2.5 MG/L
FOLATE SERPL-MCNC: 9 NG/ML
PSA SERPL-MCNC: 2.84 NG/ML (ref 0–4)
VIT B12 SERPL-MCNC: 497 PG/ML (ref 180–914)

## 2024-11-12 PROCEDURE — 36415 COLL VENOUS BLD VENIPUNCTURE: CPT

## 2024-11-12 PROCEDURE — 86140 C-REACTIVE PROTEIN: CPT

## 2024-11-12 PROCEDURE — G0103 PSA SCREENING: HCPCS

## 2024-11-12 PROCEDURE — 84207 ASSAY OF VITAMIN B-6: CPT

## 2024-11-12 PROCEDURE — 73030 X-RAY EXAM OF SHOULDER: CPT

## 2024-11-12 PROCEDURE — 84410 TESTOSTERONE BIOAVAILABLE: CPT | Performed by: FAMILY MEDICINE

## 2024-11-12 PROCEDURE — 83655 ASSAY OF LEAD: CPT

## 2024-11-12 PROCEDURE — 82746 ASSAY OF FOLIC ACID SERUM: CPT

## 2024-11-12 PROCEDURE — 83825 ASSAY OF MERCURY: CPT

## 2024-11-12 PROCEDURE — 73502 X-RAY EXAM HIP UNI 2-3 VIEWS: CPT

## 2024-11-12 PROCEDURE — 82570 ASSAY OF URINE CREATININE: CPT

## 2024-11-12 PROCEDURE — 82175 ASSAY OF ARSENIC: CPT

## 2024-11-12 PROCEDURE — 82607 VITAMIN B-12: CPT

## 2024-11-12 PROCEDURE — 82306 VITAMIN D 25 HYDROXY: CPT

## 2024-11-13 ENCOUNTER — RESULTS FOLLOW-UP (OUTPATIENT)
Dept: FAMILY MEDICINE CLINIC | Facility: CLINIC | Age: 54
End: 2024-11-13

## 2024-11-13 NOTE — RESULT ENCOUNTER NOTE
Call patient to notify normal results so far vitamin levels are all good vitamin D level is slightly low 2 points low so he should take 2000 IUs of vitamin D3 every day

## 2024-11-13 NOTE — RESULT ENCOUNTER NOTE
Please call the patient regarding his abnormal result.  Left shoulder shows calcific tendinitis in the biceps tendon that that is usually treated with injections of steroid done by a orthopedic surgeon still think patient would benefit from an MRI of the shoulder will see if we can get insurance to pay for that if not I believe he already has an appointment with [facility]

## 2024-11-15 LAB
DEPRECATED TESTOST FREE FR SERPL: 79.1 NG/DL (ref 40–250)
TESTOST SERPL-MCNC: 255 NG/DL (ref 264–916)
TESTOSTERONE.FREE+WB MFR SERPL: 31 % (ref 9–46)
VIT B6 SERPL-MCNC: 10.8 UG/L (ref 3.4–65.2)

## 2024-11-19 NOTE — RESULT ENCOUNTER NOTE
Please call the patient regarding his abnormal result.  Total testosterone level is just slightly below normal however the free testosterone is within normal limits x-rays of shoulders all normal B complex vitamins all normal MND level slightly low recommend taking vitamin D3 2000 units every day check vitamin D in 6 months shoulder weakness is most likely due to rotator cuff injury would recommend orthopedic consult for shoulder first so that they can decide if they want a plain MRI or if they want an MRI arthrogram will let them decide in the meantime if he wishes physical therapy I believe we already set him up for that with Dexter Posada

## 2024-11-20 DIAGNOSIS — D22.60: Primary | ICD-10-CM

## 2024-11-20 DIAGNOSIS — R79.89 LOW TESTOSTERONE IN MALE: ICD-10-CM

## 2024-11-21 ENCOUNTER — OFFICE VISIT (OUTPATIENT)
Dept: NEUROLOGY | Facility: CLINIC | Age: 54
End: 2024-11-21
Payer: COMMERCIAL

## 2024-11-21 VITALS
SYSTOLIC BLOOD PRESSURE: 155 MMHG | BODY MASS INDEX: 29.39 KG/M2 | DIASTOLIC BLOOD PRESSURE: 87 MMHG | HEART RATE: 59 BPM | WEIGHT: 229 LBS | HEIGHT: 74 IN | TEMPERATURE: 97.1 F

## 2024-11-21 DIAGNOSIS — G43.009 MIGRAINE WITHOUT AURA AND WITHOUT STATUS MIGRAINOSUS, NOT INTRACTABLE: Primary | ICD-10-CM

## 2024-11-21 DIAGNOSIS — R41.9 COGNITIVE COMPLAINTS: ICD-10-CM

## 2024-11-21 DIAGNOSIS — R06.83 SNORING: ICD-10-CM

## 2024-11-21 PROCEDURE — 99214 OFFICE O/P EST MOD 30 MIN: CPT | Performed by: NURSE PRACTITIONER

## 2024-11-21 RX ORDER — INDOMETHACIN 25 MG/1
CAPSULE ORAL
Qty: 30 CAPSULE | Refills: 1 | Status: SHIPPED | OUTPATIENT
Start: 2024-11-21

## 2024-11-21 RX ORDER — INDOMETHACIN 25 MG/1
CAPSULE ORAL
Qty: 30 CAPSULE | Refills: 1 | Status: SHIPPED | OUTPATIENT
Start: 2024-11-21 | End: 2024-11-21

## 2024-11-21 NOTE — PROGRESS NOTES
Review of Systems   Constitutional:  Negative for appetite change and fever.   HENT: Negative.  Negative for hearing loss, tinnitus, trouble swallowing and voice change.    Eyes: Negative.  Negative for photophobia and pain.   Respiratory: Negative.  Negative for shortness of breath.    Cardiovascular: Negative.  Negative for palpitations.   Gastrointestinal: Negative.  Negative for nausea and vomiting.   Endocrine: Negative.  Negative for cold intolerance.   Genitourinary: Negative.  Negative for dysuria, frequency and urgency.   Musculoskeletal: Negative.  Negative for myalgias and neck pain.   Skin: Negative.  Negative for rash.   Neurological:  Positive for headaches (2 a month). Negative for dizziness, tremors, seizures, syncope, facial asymmetry, speech difficulty, weakness, light-headedness and numbness.   Hematological: Negative.  Does not bruise/bleed easily.   Psychiatric/Behavioral: Negative.  Negative for confusion, hallucinations and sleep disturbance.

## 2024-11-21 NOTE — PROGRESS NOTES
Name: Robe Britton      : 1970      MRN: [id number]  Encounter Provider: SERGIO Tovar  Encounter Date: 2024   Encounter department: [facility]    :  Assessment & Plan  Snoring  Noted by patient along with daytime somnolence and fatigue, will order home sleep study to assess for sleep apnea.  Orders:    Ambulatory Referral to Sleep Medicine; Future    Migraine without aura and without status migrainosus, not intractable  Migraines currently well-controlled, he has 1 migraine every several months, admits to 3 tension type headaches per month.  These do typically resolve with Nurtec or over-the-counter medication.  He does utilize indomethacin for postcoital headache which is effective as well.    His headache frequency does not currently require preventative medications.    Orders:    rimegepant sulfate (NURTEC) 75 mg TBDP; 1 tab at migraine onset. No more than one dose per 24 hours. Hold triptan.    indomethacin (INDOCIN) 25 mg capsule; Take 30 mins prior to activity to prevent headaches    Cognitive complaints  Patient reports short-term memory complaints and inattention for the past years.  He has noted very slight progression of symptoms over this timeframe as they become more noticeable.  This does not impact his functioning at home, may affect his ability at work to hold conversations and perform presentations at times.  His cognition does seem intact today during conversation.    He does admit to poor sleep, potential symptoms of sleep apnea.  Lab workup for reversible causes were negative.  He does have low testosterone and will be seeing endocrine for this in the near future.  He does admit to fluctuating stress levels.    We did discuss the effects of normal cognitive aging, poor sleep, sleep apnea, lifestyle stressors on cognition, low suspicion for dementia at this time.  Will plan to assess for sleep apnea.  If he notes any worsening symptoms can obtain  repeat MRI brain, he did have this performed in 2022 which was normal.             History of Present Illness   HPI  Patient is a 54 year old male who presents for follow up for migraine headaches.     last office visit 11/2023 in which he was started on supplements for headaches.         Interval History:  NO new symptoms with headaches, about 2-3 headaches per month. Tension ehadaches (band around the head 3x/month, migraines left temple 1 onefew months)  Nurtec will help within 30 mins   He does have indomethacin for post coital headaches.        He did have recent eye exam at [facility] that was normal.     Current Medications:  Nurtec prn  B2, magnesium  indomethacin     He worsening short term memory complaints and forgetfulness, for the past 2 years, slightly worsening over this timeframe to become more noticeable.   Short term memory complaints, he has found in conversation he can lost track of what he wants to say or what he is saying.  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   Sleeps 5-7 hours per night, can be interrupted to urinate or due to back pain.   Does snore, has day time fatigue, daytime somnolence   Testosterone is low-is going to see endocrine   Stress levels fluctuate with his job.   No hx of depression/anxiety, is more irritable recently.         B12, vit d tsh normal      prior headache hx:  Frequency:2x/week initially  Description: neck tightness sensation, tension type, vertex of head  Triggers: perfume,   Change in temperature such as walking from warm to cold air from AC  Or vice versa, talking   severe headaches description:Left sided throbbing more severe, with nausea dry heaving has to lay down- once a month  Worse with exertion  Medications tried: preventative-HCTZ and bystolic for BP  abortive- two excedrin migraine and nap would help dull the headache, nurtec, tylenol      He does have hx of kidney stones requiring stent     CT head/neck 6/2021: No acute intracranial disease.     No large vessel flow restrictive disease within the head or neck.     MRI brain 9/2020: Unremarkable MRI of the brain.  MRI brain 8/2022: Normal.  No change    Review of Systems  Constitutional:  Negative for appetite change and fever.   HENT: Negative.  Negative for hearing loss, tinnitus, trouble swallowing and voice change.    Eyes: Negative.  Negative for photophobia and pain.   Respiratory: Negative.  Negative for shortness of breath.    Cardiovascular: Negative.  Negative for palpitations.   Gastrointestinal: Negative.  Negative for nausea and vomiting.   Endocrine: Negative.  Negative for cold intolerance.   Genitourinary: Negative.  Negative for dysuria, frequency and urgency.   Musculoskeletal: Negative.  Negative for myalgias and neck pain.   Skin: Negative.  Negative for rash.   Neurological:  Positive for headaches (2 a month). Negative for dizziness, tremors, seizures, syncope, facial asymmetry, speech difficulty, weakness, light-headedness and numbness.   Hematological: Negative.  Does not bruise/bleed easily.   Psychiatric/Behavioral: Negative.  Negative for confusion, hallucinations and sleep disturbance.    I have personally reviewed the MA's review of systems and made changes as necessary.         Objective   There were no vitals taken for this visit.    Physical Exam  Constitutional:       General: He is awake.   HENT:      Right Ear: Hearing normal.      Left Ear: Hearing normal.   Eyes:      General: Lids are normal.      Extraocular Movements: Extraocular movements intact.      Pupils: Pupils are equal, round, and reactive to light.   Neurological:      Mental Status: He is alert.      Motor: Motor strength is normal.     Deep Tendon Reflexes:      Reflex Scores:       Bicep reflexes are 2+ on the right side and 2+ on the left side.       Brachioradialis reflexes are 2+ on the right  side and 2+ on the left side.       Patellar reflexes are 2+ on the right side and 2+ on the left side.       Achilles reflexes are 2+ on the right side and 2+ on the left side.  Psychiatric:         Speech: Speech normal.       Neurologic Exam     Mental Status   Speech: speech is normal     Cranial Nerves     CN III, IV, VI   Pupils are equal, round, and reactive to light.    CN XI   Right trapezius strength: normal  Left trapezius strength: normal    Motor Exam     Strength   Strength 5/5 throughout.     Sensory Exam   Light touch normal.     Gait, Coordination, and Reflexes     Reflexes   Right brachioradialis: 2+  Left brachioradialis: 2+  Right biceps: 2+  Left biceps: 2+  Right patellar: 2+  Left patellar: 2+  Right achilles: 2+  Left achilles: 2+    Neurological Exam  Mental Status  Awake and alert. Oriented to person, place, time and situation. Speech is normal. Able to name objects. Follows complex commands. Able to perform serial calculations. Able to spell words backwards.    Cranial Nerves  CN III, IV, VI: Extraocular movements intact bilaterally. Normal lids and orbits bilaterally. Pupils equal round and reactive to light bilaterally.  CN V:  Right: Facial sensation is normal.  Left: Facial sensation is normal on the left.  CN VII:  Right: There is no facial weakness.  Left: There is no facial weakness.  CN VIII:  Right: Hearing is normal.  Left: Hearing is normal.  CN XI:  Right: Trapezius strength is normal.  Left: Trapezius strength is normal.  CN XII: Tongue midline without atrophy or fasciculations.    Motor   Strength is 5/5 throughout all four extremities.    Sensory  Light touch is normal in upper and lower extremities. Temperature is normal in upper and lower extremities.     Reflexes                                            Right                      Left  Brachioradialis                    2+                         2+  Biceps                                 2+                          2+  Patellar                                2+                         2+  Achilles                                2+                         2+    Coordination  Right: Finger-to-nose normal.Left: Finger-to-nose normal.    Gait  Casual gait is normal including stance, stride, and arm swing. Able to rise from chair without using arms.

## 2024-11-21 NOTE — ASSESSMENT & PLAN NOTE
Patient reports short-term memory complaints and inattention for the past years.  He has noted very slight progression of symptoms over this timeframe as they become more noticeable.  This does not impact his functioning at home, may affect his ability at work to hold conversations and perform presentations at times.  His cognition does seem intact today during conversation.    He does admit to poor sleep, potential symptoms of sleep apnea.  Lab workup for reversible causes were negative.  He does have low testosterone and will be seeing endocrine for this in the near future.  He does admit to fluctuating stress levels.    We did discuss the effects of normal cognitive aging, poor sleep, sleep apnea, lifestyle stressors on cognition, low suspicion for dementia at this time.  Will plan to assess for sleep apnea.  If he notes any worsening symptoms can obtain repeat MRI brain, he did have this performed in 2022 which was normal.

## 2024-11-21 NOTE — ASSESSMENT & PLAN NOTE
Migraines currently well-controlled, he has 1 migraine every several months, admits to 3 tension type headaches per month.  These do typically resolve with Nurtec or over-the-counter medication.  He does utilize indomethacin for postcoital headache which is effective as well.    His headache frequency does not currently require preventative medications.    Orders:    rimegepant sulfate (NURTEC) 75 mg TBDP; 1 tab at migraine onset. No more than one dose per 24 hours. Hold triptan.    indomethacin (INDOCIN) 25 mg capsule; Take 30 mins prior to activity to prevent headaches

## 2024-11-25 ENCOUNTER — OFFICE VISIT (OUTPATIENT)
Dept: OBGYN CLINIC | Facility: MEDICAL CENTER | Age: 54
End: 2024-11-25
Payer: COMMERCIAL

## 2024-11-25 VITALS
SYSTOLIC BLOOD PRESSURE: 135 MMHG | HEART RATE: 47 BPM | DIASTOLIC BLOOD PRESSURE: 85 MMHG | WEIGHT: 229 LBS | BODY MASS INDEX: 29.39 KG/M2 | HEIGHT: 74 IN

## 2024-11-25 DIAGNOSIS — M75.22 BICEPS TENDINITIS OF BOTH SHOULDERS: ICD-10-CM

## 2024-11-25 DIAGNOSIS — M75.52 SUBACROMIAL BURSITIS OF BOTH SHOULDERS: Primary | ICD-10-CM

## 2024-11-25 DIAGNOSIS — M75.51 SUBACROMIAL BURSITIS OF BOTH SHOULDERS: Primary | ICD-10-CM

## 2024-11-25 DIAGNOSIS — M75.21 BICEPS TENDINITIS OF BOTH SHOULDERS: ICD-10-CM

## 2024-11-25 DIAGNOSIS — M75.41 IMPINGEMENT SYNDROME OF BOTH SHOULDERS: ICD-10-CM

## 2024-11-25 DIAGNOSIS — M75.42 IMPINGEMENT SYNDROME OF BOTH SHOULDERS: ICD-10-CM

## 2024-11-25 PROCEDURE — 99203 OFFICE O/P NEW LOW 30 MIN: CPT | Performed by: ORTHOPAEDIC SURGERY

## 2024-11-25 NOTE — PROGRESS NOTES
"Orthopaedic Surgery - Office Note  Robe Britton (54 y.o. male)   : 1970   MRN: [id number]  Encounter Date: 2024    Assessment / Plan    Bilateral subacromial bursitis and long head biceps tendinitis      Physical therapy referral placed at today's visit.  Home exercise program directed by physical therapy.  Take anti-inflammatory for the next 2 weeks.  Discussed if pain fails to improve or worsens we can get an MRI.  Follow-up:  Return in about 6 weeks (around 2025).      Chief Complaint / Date of Onset  Bilateral shoulder pain  Injury Mechanism / Date  No mechanism of injury - Chronic on set over last 2 years  Surgery / Date  None    History of Present Illness   Robe Britton is a 54 y.o. male who presents for evaluation for bilateral shoulder pain. He reports he has had pain in the shoulders for the last two years. He notes he has no pain at rest or with normal daily activities. He notes having pain over the anterior aspect of the shoulder with increased activity and weightlifting. He then has a limitation in his range of motion the following day after the increased activity due to pain. He has not done any physical therapy or cortisone injections in the past. He denies any history of shoulder injuries.    Treatment Summary  Medications / Modalities  None  Bracing / Immobilization  None  Physical Therapy  None  Injections  None  Prior Surgeries  None  Other Treatments  None    Employment / Current Status      Sport / Organization / Current Status  Exercise Routine      Review of Systems  Pertinent items are noted in HPI.  All other systems were reviewed and are negative.      Physical Exam  /85   Pulse (!) 47   Ht 6' 2\" (1.88 m)   Wt 104 kg (229 lb)   BMI 29.40 kg/m²   Cons: Appears well.  No apparent distress.  Psych: Alert. Oriented x3.  Mood and affect normal.  Eyes: PERRLA, EOMI  Resp: Normal effort.  No audible wheezing or stridor.  CV: Palpable pulse.  No " discernable arrhythmia.  No LE edema.  Lymph:  No palpable cervical, axillary, or inguinal lymphadenopathy.  Skin: Warm.  No palpable masses.  No visible lesions.  Neuro: Normal muscle tone.  Normal and symmetric DTR's.     Left Shoulder Exam  Alignment / Posture:  Normal shoulder posture.  Inspection:  No swelling. No erythema. No ecchymosis.  Palpation:   long head biceps tenderness.  ROM:  Shoulder . Shoulder ER 60. Shoulder IR L4.  Strength:  Supraspinatus 5/5. Infraspinatus 5/5. Subscapularis 5/5.  Stability:  No objective shoulder instability.  Tests: (+) [place]. (+) Neer. (+) Speed. (-) [place].  Neurovascular:  Sensation intact in Ax/R/M/U nerve distributions. 2+ radial pulse.     Right Shoulder Exam  Alignment / Posture:  Normal shoulder posture.  Inspection:  No swelling. No erythema. No ecchymosis.  Palpation:   long head biceps tenderness.  ROM:  Shoulder . Shoulder ER 60. Shoulder IR L4.  Strength:  Supraspinatus 5/5. Infraspinatus 5/5. Subscapularis 5/5.  Stability:  No objective shoulder instability.  Tests: (+) [place]. (+) Neer. (+) Speed. (-) [place].  Neurovascular:  Sensation intact in Ax/R/M/U nerve distributions. 2+ radial pulse.     Studies Reviewed  XR of left shoulder - obtained on 11/12/2024 reviewed by Dr. Gonsalez demonstrates no acute fracture or dislocation. No osseous abnormalities.  XR of right shoulder - obtained on 11/12/2024 reviewed by Dr. Gonsalez demonstrates no acute fracture or dislocation. No osseous abnormalities.      Procedures  No procedures today.    Medical, Surgical, Family, and Social History  The patient's medical history, family history, and social history, were reviewed and updated as appropriate.    Past Medical History:   Diagnosis Date    YVONNE (acute kidney injury) (HCC) 12/08/2017    Arthritis     Knees, back    Back pain     cervical,lumbar    DDD (degenerative disc disease), cervical     DDD (degenerative disc disease), lumbar     Headache(784.0)      [place] (hard of hearing)     Slightly    Hyperlipidemia     Previously was on medication but doing better now    Hypertension     Kidney stone     Knee pain, right     R knee scope today 7/1/2019    Migraines     Nocturia        Past Surgical History:   Procedure Laterality Date    APPENDECTOMY      ARTHROSCOPY KNEE Right 07/01/2019    Procedure: ARTHROSCOPY KNEE WITH PARTIAL MEDIAL MENISECTOMY;  Surgeon: Orlin Gonsalez MD;  Location: [facility];  Service: Orthopedics    CYSTOSCOPY      DENTAL SURGERY      Extractions x2    EYE SURGERY      KNEE ARTHROSCOPY Bilateral     x2 on right; x1 on left    LASIK      DE CYSTO/URETERO W/LITHOTRIPSY &INDWELL STENT INSRT  12/08/2017    Procedure: CYSTOSCOPY URETEROSCOPY  RETROGRADE PYELOGRAM AND INSERTION STENT URETERAL;  Surgeon: Eligio Younger MD;  Location: [facility];  Service: Urology    DE CYSTO/URETERO W/LITHOTRIPSY &INDWELL STENT INSRT Right 01/02/2018    Procedure: CYSTOSCOPY, RIGHT RETROGRADE PYELOGRAM, RIGHT URETEROSCOPY WITH  STONE MANIPULATION AND EXTRACTION, RIGHT DOUBLE J STENT EXCHANGE;  Surgeon: Eligio Younger MD;  Location:  MAIN OR;  Service: Urology    TONSILLECTOMY      VASECTOMY      WISDOM TOOTH EXTRACTION         Family History   Problem Relation Age of Onset    Hypertension Mother     Diabetes Mother     Arthritis Mother     Esophageal cancer Father     Throat cancer Father     Prostate cancer Father     Hypertension Father     Arthritis Father     Drug abuse Sister     Suicide Attempts Sister        Social History     Occupational History    Occupation:    Tobacco Use    Smoking status: Former     Types: Cigars     Passive exposure: Never    Smokeless tobacco: Never    Tobacco comments:     3 cigars yearly   Vaping Use    Vaping status: Never Used   Substance and Sexual Activity    Alcohol use: No    Drug use: No    Sexual activity: Yes     Partners: Female     Birth control/protection: Male Sterilization       No Known  Allergies      Current Outpatient Medications:     atorvastatin (LIPITOR) 20 mg tablet, TAKE 1 TABLET DAILY, Disp: 90 tablet, Rfl: 3    Cholecalciferol (VITAMIN D3 PO), Take by mouth in the morning, Disp: , Rfl:     cyclobenzaprine (FLEXERIL) 10 mg tablet, Take 1 tablet (10 mg total) by mouth 3 (three) times a day as needed for muscle spasms, Disp: 15 tablet, Rfl: 0    hydroCHLOROthiazide 12.5 mg capsule, TAKE 1 CAPSULE EVERY MORNING, Disp: 90 capsule, Rfl: 3    indomethacin (INDOCIN) 25 mg capsule, Take 30 mins prior to activity to prevent headaches, Disp: 30 capsule, Rfl: 1    Multiple Vitamins-Minerals (ZINC PO), Take 250 mg by mouth in the morning, Disp: , Rfl:     nebivolol (BYSTOLIC) 5 mg tablet, TAKE 1 TABLET DAILY, Disp: 90 tablet, Rfl: 3    omeprazole (PriLOSEC) 20 mg delayed release capsule, Take 1 capsule (20 mg total) by mouth daily, Disp: 90 capsule, Rfl: 0    RIBOFLAVIN PO, Take by mouth in the morning, Disp: , Rfl:     rimegepant sulfate (NURTEC) 75 mg TBDP, 1 tab at migraine onset. No more than one dose per 24 hours. Hold triptan., Disp: 8 tablet, Rfl: 1      Gregorio Tidwell    Scribe Attestation      I,:  Greogrio Tidwell am acting as a scribe while in the presence of the attending physician.:       I,:  Orlin Gonsalez MD personally performed the services described in this documentation    as scribed in my presence.:

## 2024-11-27 ENCOUNTER — TELEPHONE (OUTPATIENT)
Dept: OBGYN CLINIC | Facility: MEDICAL CENTER | Age: 54
End: 2024-11-27

## 2024-11-27 NOTE — TELEPHONE ENCOUNTER
Massimom and sent message on My chart for Pt. To call back to reschedule 01/10/25 with Dr. Gonsalez.

## 2024-12-02 ENCOUNTER — TRANSCRIBE ORDERS (OUTPATIENT)
Dept: SLEEP CENTER | Facility: CLINIC | Age: 54
End: 2024-12-02

## 2024-12-02 DIAGNOSIS — R06.83 SNORING: Primary | ICD-10-CM

## 2024-12-06 LAB
ARSENIC 24H UR-MCNC: 37 UG/L (ref 0–9)
ARSENIC, DMA: 7 UG/L
ARSENIC, INORGANIC: <10 UG/L
ARSENIC, MMA: <5 UG/L
ARSENIC, TOTAL TOXIC: 7 UG/L
CREAT UR-MCNC: 2.74 G/L (ref 0.3–3)
INORG ARSENIC/CREAT UR: 14 UG/G CREAT
LABORATORY COMMENT REPORT: NORMAL
LEAD 24H UR-MCNC: ABNORMAL UG/L (ref 0–49)
MERCURY 24H UR-MCNC: 3 UG/L (ref 0–19)
MERCURY/CREAT UR: 1 UG/G CREAT (ref 0–4)

## 2024-12-11 ENCOUNTER — HOSPITAL ENCOUNTER (OUTPATIENT)
Dept: SLEEP CENTER | Facility: HOSPITAL | Age: 54
Discharge: HOME | End: 2024-12-11
Payer: COMMERCIAL

## 2024-12-11 DIAGNOSIS — R06.83 SNORING: ICD-10-CM

## 2024-12-11 PROCEDURE — G0399 HOME SLEEP TEST/TYPE 3 PORTA: HCPCS

## 2024-12-11 NOTE — PROGRESS NOTES
Home Sleep Study Documentation    HOME STUDY DEVICE: Noxturnal no                                           Jaz G3 yes device # 17      Pre-Sleep Home Study:    Set-up and instructions performed by: More    Technician performed demonstration for Patient: yes    Return demonstration performed by Patient: yes    Written instructions provided to Patient: yes    Patient signed consent form: yes        Post-Sleep Home Study:    Additional comments by Patient: None    Home Sleep Study Failed:no:    Failure reason: N/A    Reported or Detected: N/A    Scored by: SAI Arevalo

## 2024-12-12 ENCOUNTER — TELEPHONE (OUTPATIENT)
Age: 54
End: 2024-12-12

## 2024-12-12 DIAGNOSIS — R82.90 URINE FINDINGS ABNORMAL: ICD-10-CM

## 2024-12-12 DIAGNOSIS — R82.90 URINE ABNORMALITY: Primary | ICD-10-CM

## 2024-12-12 NOTE — TELEPHONE ENCOUNTER
Pt called back in regards to his Urine Arsenic test. He is concerned that the level is 37. Please have PCP review and call patient with recommendations for this level.

## 2024-12-13 PROBLEM — G47.33 OSA (OBSTRUCTIVE SLEEP APNEA): Status: ACTIVE | Noted: 2024-12-13

## 2024-12-16 PROCEDURE — G0399 HOME SLEEP TEST/TYPE 3 PORTA: HCPCS | Performed by: INTERNAL MEDICINE

## 2024-12-17 ENCOUNTER — RESULTS FOLLOW-UP (OUTPATIENT)
Dept: NEUROLOGY | Facility: CLINIC | Age: 54
End: 2024-12-17

## 2024-12-17 DIAGNOSIS — G47.33 OBSTRUCTIVE SLEEP APNEA (ADULT) (PEDIATRIC): Primary | ICD-10-CM

## 2024-12-19 ENCOUNTER — OFFICE VISIT (OUTPATIENT)
Dept: SLEEP CENTER | Facility: CLINIC | Age: 54
End: 2024-12-19
Payer: COMMERCIAL

## 2024-12-19 ENCOUNTER — TELEPHONE (OUTPATIENT)
Dept: SLEEP CENTER | Facility: CLINIC | Age: 54
End: 2024-12-19

## 2024-12-19 ENCOUNTER — OFFICE VISIT (OUTPATIENT)
Dept: AUDIOLOGY | Facility: CLINIC | Age: 54
End: 2024-12-19
Payer: COMMERCIAL

## 2024-12-19 VITALS
WEIGHT: 227 LBS | OXYGEN SATURATION: 97 % | DIASTOLIC BLOOD PRESSURE: 80 MMHG | SYSTOLIC BLOOD PRESSURE: 118 MMHG | BODY MASS INDEX: 29.13 KG/M2 | HEIGHT: 74 IN | HEART RATE: 48 BPM

## 2024-12-19 DIAGNOSIS — H90.3 SENSORY HEARING LOSS, BILATERAL: Primary | ICD-10-CM

## 2024-12-19 DIAGNOSIS — G47.33 OBSTRUCTIVE SLEEP APNEA (ADULT) (PEDIATRIC): Primary | ICD-10-CM

## 2024-12-19 DIAGNOSIS — I15.8 OTHER SECONDARY HYPERTENSION: ICD-10-CM

## 2024-12-19 DIAGNOSIS — H91.93 HEARING DEFICIT, BILATERAL: ICD-10-CM

## 2024-12-19 PROCEDURE — 99245 OFF/OP CONSLTJ NEW/EST HI 55: CPT | Performed by: NURSE PRACTITIONER

## 2024-12-19 PROCEDURE — 92567 TYMPANOMETRY: CPT | Performed by: AUDIOLOGIST-HEARING AID FITTER

## 2024-12-19 PROCEDURE — 92557 COMPREHENSIVE HEARING TEST: CPT | Performed by: AUDIOLOGIST-HEARING AID FITTER

## 2024-12-19 NOTE — PATIENT INSTRUCTIONS
Patient Instructions:     It is strongly recommended that you consider treatment with CPAP equipment.  We could start AutoPAP and check the oxygen level at home with use.  Call the office or message on [facility] if you would like to proceed.  Avoid alcohol or sedating medications, which can worsen sleep apnea  Avoid back sleeping, where it seems worse.  Consider a wedge pillow or adjustable bed    Thank you for trusting me with your care!    I know we often  cover a lot of information at the visit, so if you have follow-up questions, are unclear about the plan, or feel there were important items that we did not discuss or you did not receive clarity on, please don't hesitate to reach out to me.     [facility] messages are preferred for routine matters.  Please make sure to call for urgent matters as there can be a delay in responding to questions over [facility].    IMPORTANT- Prior to a sleep study (at home or in the sleep lab), I strongly recommend contacting your medical insurance first to understand your benefits (including deductible if applicable), coverage for this test, and out of pocket costs.  Even if the test is approved by medical insurance, the cost to you is determined by your medical benefits.   If you have concerns about your out of pocket costs for sleep testing, please contact me/the office before you complete the test and we can discuss if there are alternate options.     I recommend following this advice in general before any lab test, imaging test, doctor visit, surgery, or ordering CPAP supplies as it is best to understand your coverage to avoid unexpected bills after the fact.       Nursing Support:  When: Monday through Friday 7:30A-4:30PM except holidays  Where: Our direct line is [phone]  *3  *1.      If you are having a true emergency please call 911.  In the event that the line is busy or it is after hours please leave a voice message and we will return your call.  Please speak clearly,  leaving your full name, birth date, best number to reach you and the reason for your call.   Medication refills: We will need the name of the medication, the dosage, the ordering provider, whether you get a 30 or 90 day refill, and the pharmacy name and address.  Medications will be ordered by the provider only.  Nurses cannot call in prescriptions.  Please allow 7 days for medication refills.  Physician requested updates: If your provider requested that you call with an update after starting medication, please be ready to provide us the medication and dosage, what time you take your medication, the time you attempt to fall asleep, time you fall asleep, when you wake up, and what time you get out of bed.  Sleep Study Results: We will contact you with sleep study results and/or next steps after the physician has reviewed your testing.

## 2024-12-19 NOTE — ASSESSMENT & PLAN NOTE
Hypertension - We reviewed the association between untreated obstructive sleep apnea and the increased risk for hypertension. Patient to continue on prescribed anti-hypertensive therapy (HCTZ, nebivolol) and follow up with PCP for continuity of care.

## 2024-12-19 NOTE — PROGRESS NOTES
Name: Robe Britton      : 1970      MRN: [id number]  Encounter Provider: SERGIO Quinn  Encounter Date: 2024   Encounter department[facility]  :  Assessment & Plan  Obstructive sleep apnea (adult) (pediatric)  Today, we reviewed the results of the home sleep study, completed on 24 with the patient in detail.   We talked about the abnormal findings, including those consistent with Obstructive Sleep Apnea. We then discussed how the these are categorized as mild, moderate or severe.  We also covered the medical comorbidities associated with untreated Obstructive Sleep Apnea including, hypertension, cardiac arrythmia, myocardial infarction and stroke.  I explained how the risk of these conditions increases with the severity of the test results.  I also spoke in some detail about the most common treatment options including weight loss, nasal PAP, mandibular advancement devices and uvulopalatopharyngoplasty (UPPP).  I answered all questions posed to me and gave my opinion regarding the best options for treatment based on the patient and their level of disease.  He would like some time to contemplate the treatment options before deciding on the appropriate treatment.  He was advised to call or message once he has made a decision.  I requested that he call if he has any questions or concerns regarding his results or the options discussed.    Orders:    Ambulatory Referral to Sleep Medicine    Other secondary hypertension  Hypertension - We reviewed the association between untreated obstructive sleep apnea and the increased risk for hypertension. Patient to continue on prescribed anti-hypertensive therapy (HCTZ, nebivolol) and follow up with PCP for continuity of care.              History of Present Illness   Jorge Britton is a 54 year old gentleman with a PMHx of HTN and migraines.  He had concern of loud snoring, sleep initiation and sleep maintenance insomnia and  non-restorative sleep with daytime sleepiness.  He follows with neurology regarding migraines and voiced concern regarding mild cognitive changes he noticed lately.  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  A home sleep study was ordered and completed a few days ago.  During the study, he had a total of 100 respiratory events made up of 45 obstructive apneas, 0 central apneas, 0 mixed apneas and 55 hypopneas resulting in a respiratory event index (AYLIN) of 14.8/h and the index was higher while supine..  The lowest SpO2 recorded is 68% and 37.7 minutes during the study was spent with saturations below 90%.        Patient accompanied at this vist by : unaccompanied      Thompson Sleepiness Scale:  Sitting and reading: (Patient-Rptd) Moderate chance of dozing  Watching TV: (Patient-Rptd) Slight chance of dozing  Sitting, inactive in a public place (e.g. a theatre or a meeting): (Patient-Rptd) Would never doze  As a passenger in a car for an hour without a break: (Patient-Rptd) Would never doze  Lying down to rest in the afternoon when circumstances permit: (Patient-Rptd) High chance of dozing  Sitting and talking to someone: (Patient-Rptd) Would never doze  Sitting quietly after a lunch without alcohol: (Patient-Rptd) Would never doze  In a car, while stopped for a few minutes in traffic: (Patient-Rptd) Would never doze  Total score: (Patient-Rptd) 6     Review of Systems  Pertinent positives/negatives included in HPI and also as noted:  migraines    What is the main reason for the visit? (select one) Concern for sleep apnea   What other concerns do you have? Problems falling and/or staying asleep    Snoring    Excessive daytime sleepiness    Frequent headaches    Follow-up of recent sleep study    Other   How does your sleep problem affect your activities of daily living? 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 No   Have you ever had a sleep study done? Yes   Where was the study done? Home sleep test done through outside medical/dental office   What time do you typically go to bed weekdays or workdays? 10:00 pm   What time do you typically go to bed weekends or days off? 12:00 am   How long does it take to fall asleep? 0-15 minutes   How often is it hard for you to fall asleep? Few times a month   How often do you sleep through the night without waking up? Never   If you do wake during the night, how many times do you typically wake up during your sleep time? 5 or more times per night   What wakes you up? Other, for urination once per night, tossing and turning, repositioning   How often is it hard for you to return to sleep after awakening in the middle of the night? Few times a month - with situational stress   What time do you wake up for the last time on Weekdays or Workdays? 5:00 am   What time do you wake up for the last time on weekends? 6:00 am   How do you wake up for the final time to start your day? With aid of an alarm   How many hours do you sleep on average? 7   Do you typically feel refreshed when you first awaken? Rarely   Are you sleepy during the day? Usually   Do you intentionally try to nap? No, but would if given the opportunity   How often do you doze (fall asleep without intending to)? A few times a month   Do you currently use medication, supplements or substances to help you fall or stay asleep? No - tried melatonin, but didn't find it helpful   In the past have you used medication, supplements or substances to help you fall or stay asleep? No   Do you use any of the following to stay awake? None of the above   Do you drink any of the following beverages more than a few days a week? Coffee   What time do you usually last drink a caffeinated beverage? 3:30-4:00pm coffee   Do you share a bed with someone at home? Yes,  "always   Are you aware or have you been told that you have these breathing-related symptoms while sleeping? Snore loudly   Do you breathe well through your nose? No problems with breathing through nose   Have you ever used CPAP or BIPAP machine? No   Do you use oxygen? No   RLS Symptoms    Do you ever experience any of the following? No Symptoms   Narcolepsy symptoms    Have you ever experienced any of the following? No Symptoms   Parasomnia Symptoms    Have you ever experienced any of the following? Nightmares - just has them, no issues   Misc Symptoms    Have you ever experienced the following? Decreased concentration    Chronic pain   No reports of restless leg symptoms    Sister recently diagnosed with SKY and using CPAP equipment to treat      Objective   /80   Pulse (!) 48   Ht 6' 2\" (1.88 m)   Wt 103 kg (227 lb)   SpO2 97%   BMI 29.15 kg/m²     Neck Circumference: 16.5  Physical Exam    Constitutional: Alert, cooperative, no distress, appears stated age  Skin: Warm, dry, no rashes noted  Eyes: Conjunctiva/corneas clear  ENT: Nasal congestion absent, nares  normal, septum normal, mucosa normal  Posterior Airspace:   Dorsey Tongue Position: 3  Retrognathia: present  Overbite: absent  High Arched Palate: absent  Tongue Scalloping/Ridging: absent  Tonsils:  not visualized  Uvula: normal  Lungs: Clear to auscultation bilaterally, respirations unlabored  Heart: bradycardic rate and regular rhythm, S1/2 normal, no murmur noted, no rub or gallop  Extremities: Normal, no digital clubbing, no pedal edema  Neuro: No focal deficits noted      Data  Lab Results   Component Value Date    HGB 15.2 11/04/2024    HCT 45.5 11/04/2024    MCV 91 11/04/2024      Lab Results   Component Value Date    GLUCOSE 85 09/08/2015    CALCIUM 9.0 11/04/2024     09/08/2015    K 4.3 11/04/2024    CO2 33 (H) 11/04/2024     11/04/2024    BUN 21 11/04/2024    CREATININE 1.26 11/04/2024     No results found for: \"IRON\", " "\"TIBC\", \"FERRITIN\"  Lab Results   Component Value Date    AST 23 11/04/2024    ALT 26 11/04/2024     Patient Instructions:     It is strongly recommended that you consider treatment with CPAP equipment.  We could start AutoPAP and check the oxygen level at home with use.  Call the office or message on [facility] if you would like to proceed.  Avoid alcohol or sedating medications, which can worsen sleep apnea  Avoid back sleeping, where it seems worse.  Consider a wedge pillow or adjustable bed    Thank you for trusting me with your care!    I know we often  cover a lot of information at the visit, so if you have follow-up questions, are unclear about the plan, or feel there were important items that we did not discuss or you did not receive clarity on, please don't hesitate to reach out to me.     [facility] messages are preferred for routine matters.  Please make sure to call for urgent matters as there can be a delay in responding to questions over [phone].    IMPORTANT- Prior to a sleep study (at home or in the sleep lab), I strongly recommend contacting your medical insurance first to understand your benefits (including deductible if applicable), coverage for this test, and out of pocket costs.  Even if the test is approved by medical insurance, the cost to you is determined by your medical benefits.   If you have concerns about your out of pocket costs for sleep testing, please contact me/the office before you complete the test and we can discuss if there are alternate options.     I recommend following this advice in general before any lab test, imaging test, doctor visit, surgery, or ordering CPAP supplies as it is best to understand your coverage to avoid unexpected bills after the fact.       Nursing Support:  When: Monday through Friday 7:30A-4:30PM except holidays  Where: Our direct line is [phone]  *3  *1.      If you are having a true emergency please call 911.  In the event that the line is busy or it " is after hours please leave a voice message and we will return your call.  Please speak clearly, leaving your full name, birth date, best number to reach you and the reason for your call.   Medication refills: We will need the name of the medication, the dosage, the ordering provider, whether you get a 30 or 90 day refill, and the pharmacy name and address.  Medications will be ordered by the provider only.  Nurses cannot call in prescriptions.  Please allow 7 days for medication refills.  Physician requested updates: If your provider requested that you call with an update after starting medication, please be ready to provide us the medication and dosage, what time you take your medication, the time you attempt to fall asleep, time you fall asleep, when you wake up, and what time you get out of bed.  Sleep Study Results: We will contact you with sleep study results and/or next steps after the physician has reviewed your testing.      Administrative Statements   I have spent a total time of 55 minutes in caring for this patient on the day of the visit/encounter including Risks and benefits of tx options, Instructions for management, Patient and family education, Importance of tx compliance, Risk factor reductions, Impressions, Counseling / Coordination of care, Documenting in the medical record, Reviewing / ordering tests, medicine, procedures  , and Obtaining or reviewing history  .

## 2024-12-19 NOTE — PROGRESS NOTES
Diagnostic Hearing Evaluation    Name:  Robe Britton  :  1970  Age:  54 y.o.   MRN:  [id number]  Date of Evaluation: 24     HISTORY:     Reason for visit: Difficulty Understanding    Robe Britton is being seen today at the request of Dr. Gilliland for an initial  evaluation of hearing. The patient reports trouble hearing things clearly. The patient  denies otalgia, otorrhea, dizziness, fullness, and tinnitus. There is a history of noise exposure.    EVALUATION:    Otoscopic Evaluation:   Right Ear: Unremarkable, canal clear   Left Ear: Unremarkable, canal clear    Tympanometry:   Right Ear: Type A; normal middle ear pressure and static compliance    Left Ear: Type A; normal middle ear pressure and static compliance     Speech Audiometry:  Speech Reception (SRT)    Right Ear: 20 dB HL    Left Ear: 20 dB HL    Word Recognition Scores (WRS):  Right Ear: excellent (100 % correct)     Left Ear: excellent (100 % correct)    Stimuli: NU-6    Pure Tone Audiometry:  Conventional pure tone audiometry from 250 - 8000 Hz  was obtained with good reliability and revealed the following:     Right Ear: Mild sloping to moderate sensorineural hearing loss (SNHL)    Left Ear: Mild sloping to moderate sensorineural hearing loss (SNHL)     *see attached audiogram    RECOMMENDATIONS:  Return to [facility] for F/U    PATIENT EDUCATION:   The results of today's results and recommendations were reviewed with the patient and his hearing thresholds were explained at length. Questions were addressed and the patient was encouraged to contact our department should concerns arise.      Glenny Hardy, CCC-A  Clinical Audiologist  [facility] AUDIOLOGY [address] [address]

## 2024-12-22 DIAGNOSIS — K21.9 GASTROESOPHAGEAL REFLUX DISEASE WITHOUT ESOPHAGITIS: ICD-10-CM

## 2024-12-23 ENCOUNTER — APPOINTMENT (OUTPATIENT)
Dept: LAB | Facility: MEDICAL CENTER | Age: 54
End: 2024-12-23
Payer: COMMERCIAL

## 2024-12-23 DIAGNOSIS — R82.90 URINE ABNORMALITY: ICD-10-CM

## 2024-12-23 DIAGNOSIS — R82.90 URINE FINDINGS ABNORMAL: ICD-10-CM

## 2024-12-23 PROCEDURE — 82570 ASSAY OF URINE CREATININE: CPT

## 2024-12-23 PROCEDURE — 82175 ASSAY OF ARSENIC: CPT

## 2024-12-28 LAB
ARSENIC 24H UR-MCNC: NORMAL UG/L (ref 0–9)
CREAT UR-MCNC: 1.12 G/L (ref 0.3–3)

## 2024-12-30 ENCOUNTER — RESULTS FOLLOW-UP (OUTPATIENT)
Dept: FAMILY MEDICINE CLINIC | Facility: CLINIC | Age: 54
End: 2024-12-30

## 2024-12-30 ENCOUNTER — TELEPHONE (OUTPATIENT)
Dept: SLEEP CENTER | Facility: CLINIC | Age: 54
End: 2024-12-30

## 2024-12-30 DIAGNOSIS — R41.9 COGNITIVE COMPLAINTS: ICD-10-CM

## 2024-12-30 DIAGNOSIS — I15.8 OTHER SECONDARY HYPERTENSION: ICD-10-CM

## 2024-12-30 DIAGNOSIS — G47.33 OSA (OBSTRUCTIVE SLEEP APNEA): Primary | ICD-10-CM

## 2024-12-30 NOTE — TELEPHONE ENCOUNTER
Per [employer] message:  I would like to make arrangements for the use of the CPAP equipment that you recommended at the December 19 visit. Please let me know if I need to do anything else to facilitate this request. Thank you.   ---------------------------------------------    Yumiko, patient agreeable to try CPAP.  Please provide prescrioption at recommended settings.  Thank you.

## 2025-01-02 ENCOUNTER — TELEPHONE (OUTPATIENT)
Dept: SLEEP CENTER | Facility: CLINIC | Age: 55
End: 2025-01-02

## 2025-01-02 NOTE — TELEPHONE ENCOUNTER
Rx for new CPAP equipment provided.  Patient to be assisted to schedule set up and compliance follow up in 31-91 days.

## 2025-01-02 NOTE — TELEPHONE ENCOUNTER
Call placed to patient.  Patient amenable to use [employer] as DME supplier.  Added to Teams DME chat for order processing.    Compliance appointment with SERGIO Quinn 3/24/2025 in [place].

## 2025-01-03 LAB
DME PARACHUTE DELIVERY DATE REQUESTED: NORMAL
DME PARACHUTE ITEM DESCRIPTION: NORMAL
DME PARACHUTE ORDER STATUS: NORMAL
DME PARACHUTE SUPPLIER NAME: NORMAL
DME PARACHUTE SUPPLIER PHONE: NORMAL

## 2025-01-08 ENCOUNTER — OFFICE VISIT (OUTPATIENT)
Dept: ENDOCRINOLOGY | Facility: CLINIC | Age: 55
End: 2025-01-08
Payer: COMMERCIAL

## 2025-01-08 VITALS
SYSTOLIC BLOOD PRESSURE: 132 MMHG | BODY MASS INDEX: 30.16 KG/M2 | HEART RATE: 64 BPM | WEIGHT: 235 LBS | DIASTOLIC BLOOD PRESSURE: 80 MMHG | OXYGEN SATURATION: 98 % | HEIGHT: 74 IN | TEMPERATURE: 97.4 F

## 2025-01-08 DIAGNOSIS — R79.89 LOW TESTOSTERONE IN MALE: Primary | ICD-10-CM

## 2025-01-08 DIAGNOSIS — G47.33 OSA (OBSTRUCTIVE SLEEP APNEA): ICD-10-CM

## 2025-01-08 DIAGNOSIS — E03.8 SUBCLINICAL HYPOTHYROIDISM: ICD-10-CM

## 2025-01-08 LAB

## 2025-01-08 PROCEDURE — 99244 OFF/OP CNSLTJ NEW/EST MOD 40: CPT | Performed by: STUDENT IN AN ORGANIZED HEALTH CARE EDUCATION/TRAINING PROGRAM

## 2025-01-08 NOTE — PROGRESS NOTES
Name: Robe Britton      : 1970      MRN: [id number]  Encounter Provider: Terrence Mckeon, DO  Encounter Date: 2025   Encounter department: [facility]  :  Assessment & Plan  Low testosterone in male  Patient chief symptom is fatigue. No spec si/sx of hypogonadism, no apparent RF for HPG axis disease. Testosterone level is low, however free testosterone appears to be in range. This may be due to an abnormality in testosterone binding proteins. I explained HPG axis anatomy and physiology, and discussed pathophysiology of hypogonadism, including its etiologies and management. We received some pro/cons of testosterone therapy as well. Will plan labs for reassessment, including free testosterone by dialysis (gold standard) and measurement of SHBG. I will contact patient with results and recommendations  Orders:    Ambulatory Referral to Endocrinology    Comprehensive metabolic panel    Luteinizing hormone    Sex Hormone Binding Globulin    T4, free    Testosterone, Free/Tot Equilib    Thyroid Antibodies Panel    TSH, 3rd generation    Subclinical hypothyroidism  History of subclinical hypothyroidism.   Orders:    T4, free    Thyroid Antibodies Panel    TSH, 3rd generation    SKY (obstructive sleep apnea)  This may be related to symptoms.          RTC TBD    History of Present Illness   HPI  Robe Britton is a 54 y.o. male who presents for low testosterone. Notes symptoms of fatigue and weakness, which have been progressive. He reports no significant impairment in sexual function or libido. Mentions no history of systemic steroid use, opioid use, recreational drugs, or other supplements. He mentions no history of head or testicular trauma. He has 2 biological children. He is in process of being evaluated and treated for CPAP.         Review of Systems   Constitutional:  Positive for fatigue.   Neurological:  Positive for weakness and headaches (history of  "headaches).   All other systems reviewed and are negative.         Objective   /80 (BP Location: Left arm, Patient Position: Sitting)   Pulse 64   Temp (!) 97.4 °F (36.3 °C)   Ht 6' 2\" (1.88 m)   Wt 107 kg (235 lb)   SpO2 98%   BMI 30.17 kg/m²      Physical Exam  Vitals reviewed.   Constitutional:       General: He is not in acute distress.     Appearance: Normal appearance.   HENT:      Head: Normocephalic and atraumatic.      Nose: Nose normal.   Eyes:      General: No scleral icterus.     Conjunctiva/sclera: Conjunctivae normal.   Pulmonary:      Effort: Pulmonary effort is normal. No respiratory distress.   Musculoskeletal:         General: No deformity.      Cervical back: Normal range of motion.   Skin:     General: Skin is warm and dry.   Neurological:      Mental Status: He is alert.   Psychiatric:         Mood and Affect: Mood normal.         Behavior: Behavior normal.           Component      Latest Ref Rng 11/4/2024 11/12/2024   WBC      4.31 - 10.16 Thousand/uL 5.86     RBC      3.88 - 5.62 Million/uL 5.00     Hemoglobin      12.0 - 17.0 g/dL 15.2     Hematocrit      36.5 - 49.3 % 45.5     MCV      82 - 98 fL 91     MCH      26.8 - 34.3 pg 30.4     MCHC      31.4 - 37.4 g/dL 33.4     RDW      11.6 - 15.1 % 13.1     Platelet Count --     nRBC      /100 WBCs 0     Segmented %      43 - 75 % 55     Immature Grans %      0 - 2 % 0     Lymphocytes %      14 - 44 % 35     Monocytes %      4 - 12 % 8     Eosinophils %      0 - 6 % 1     Basophils %      0 - 1 % 1     Absolute Neutrophils      1.85 - 7.62 Thousands/µL 3.21     Absolute Immature Grans      0.00 - 0.20 Thousand/uL 0.02     Absolute Lymphocytes      0.60 - 4.47 Thousands/µL 2.03     Absolute Monocytes      0.17 - 1.22 Thousand/µL 0.49     Absolute Eosinophils      0.00 - 0.61 Thousand/µL 0.07     Absolute Basophils      0.00 - 0.10 Thousands/µL 0.04     Sodium      135 - 147 mmol/L 143     Potassium      3.5 - 5.3 mmol/L 4.3   "   Chloride      96 - 108 mmol/L 103     Carbon Dioxide      21 - 32 mmol/L 33 (H)     ANION GAP      4 - 13 mmol/L 7     BUN      5 - 25 mg/dL 21     Creatinine      0.60 - 1.30 mg/dL 1.26     GLUCOSE      65 - 140 mg/dL 97     Calcium      8.4 - 10.2 mg/dL 9.0     AST      13 - 39 U/L 23     ALT      7 - 52 U/L 26     ALK PHOS      34 - 104 U/L 55     Total Protein      6.4 - 8.4 g/dL 6.6     Albumin      3.5 - 5.0 g/dL 4.2     Total Bilirubin      0.20 - 1.00 mg/dL 0.49     GFR, Calculated      ml/min/1.73sq m 64     Cholesterol      See Comment mg/dL 166     Triglycerides      See Comment mg/dL 275 (H)     HDL      >=40 mg/dL 41     LDL Calculated      0 - 100 mg/dL 70     Non-HDL Cholesterol      mg/dl 125     Testosterone, Total, LC/MS      264 - 916 ng/dL  255 (L)    TESTOST., % FREE & WEAKLY BOUND      9.0 - 46.0 %  31.0    TESTOST., F&W BOUND      40.0 - 250.0 ng/dL  79.1    VITAMIN D, 25-HYDROXY      30.0 - 100.0 ng/mL 34.0        Legend:  (H) High  (L) Low

## 2025-01-22 ENCOUNTER — TELEPHONE (OUTPATIENT)
Dept: SLEEP CENTER | Facility: CLINIC | Age: 55
End: 2025-01-22

## 2025-01-22 LAB

## 2025-01-22 NOTE — TELEPHONE ENCOUNTER
Patient was set up today by Kendra RIVERA on a Res med s-11 machine set at  5-20 cm erp2  with a res med N30I Med mask and chin strap.

## 2025-01-25 ENCOUNTER — APPOINTMENT (OUTPATIENT)
Dept: LAB | Facility: MEDICAL CENTER | Age: 55
End: 2025-01-25
Payer: COMMERCIAL

## 2025-01-25 LAB
ALBUMIN SERPL BCG-MCNC: 4.5 G/DL (ref 3.5–5)
ALP SERPL-CCNC: 57 U/L (ref 34–104)
ALT SERPL W P-5'-P-CCNC: 30 U/L (ref 7–52)
ANION GAP SERPL CALCULATED.3IONS-SCNC: 7 MMOL/L (ref 4–13)
AST SERPL W P-5'-P-CCNC: 26 U/L (ref 13–39)
BILIRUB SERPL-MCNC: 0.77 MG/DL (ref 0.2–1)
BUN SERPL-MCNC: 20 MG/DL (ref 5–25)
CALCIUM SERPL-MCNC: 9.6 MG/DL (ref 8.4–10.2)
CHLORIDE SERPL-SCNC: 103 MMOL/L (ref 96–108)
CO2 SERPL-SCNC: 34 MMOL/L (ref 21–32)
CREAT SERPL-MCNC: 1.33 MG/DL (ref 0.6–1.3)
GFR SERPL CREATININE-BSD FRML MDRD: 60 ML/MIN/1.73SQ M
GLUCOSE P FAST SERPL-MCNC: 96 MG/DL (ref 65–99)
LH SERPL-ACNC: 3.1 MIU/ML (ref 1.2–8.6)
POTASSIUM SERPL-SCNC: 4.1 MMOL/L (ref 3.5–5.3)
PROT SERPL-MCNC: 7.3 G/DL (ref 6.4–8.4)
SODIUM SERPL-SCNC: 144 MMOL/L (ref 135–147)
T4 FREE SERPL-MCNC: 0.84 NG/DL (ref 0.61–1.12)
TSH SERPL DL<=0.05 MIU/L-ACNC: 4.32 UIU/ML (ref 0.45–4.5)

## 2025-01-25 PROCEDURE — 84403 ASSAY OF TOTAL TESTOSTERONE: CPT

## 2025-01-27 ENCOUNTER — TELEPHONE (OUTPATIENT)
Dept: SLEEP CENTER | Facility: CLINIC | Age: 55
End: 2025-01-27

## 2025-01-27 ENCOUNTER — RESULTS FOLLOW-UP (OUTPATIENT)
Dept: ENDOCRINOLOGY | Facility: CLINIC | Age: 55
End: 2025-01-27

## 2025-01-27 DIAGNOSIS — G47.33 OSA (OBSTRUCTIVE SLEEP APNEA): Primary | ICD-10-CM

## 2025-01-27 NOTE — TELEPHONE ENCOUNTER
Patient is here for a new mask. Patient was fitted with the res [employer] F-30 I med.     Can I please get a new script for a full face mask.     Thank you

## 2025-01-28 LAB
DME PARACHUTE DELIVERY DATE REQUESTED: NORMAL
DME PARACHUTE ITEM DESCRIPTION: NORMAL
DME PARACHUTE ORDER STATUS: NORMAL
DME PARACHUTE SUPPLIER NAME: NORMAL
DME PARACHUTE SUPPLIER PHONE: NORMAL
THYROGLOB AB SERPL-ACNC: <1 IU/ML (ref 0–0.9)

## 2025-01-29 LAB — THYROPEROXIDASE AB SERPL-ACNC: 12 IU/ML (ref 0–34)

## 2025-01-30 LAB
MISCELLANEOUS LAB TEST RESULT: NORMAL
SHBG SERPL-SCNC: 21.9 NMOL/L (ref 19.3–76.4)

## 2025-02-17 DIAGNOSIS — R41.3 MEMORY DYSFUNCTION: ICD-10-CM

## 2025-02-17 DIAGNOSIS — M79.10 MUSCLE PAIN: ICD-10-CM

## 2025-02-17 DIAGNOSIS — M62.81 GENERALIZED MUSCLE WEAKNESS: ICD-10-CM

## 2025-02-17 DIAGNOSIS — R53.82 CHRONIC FATIGUE: ICD-10-CM

## 2025-02-17 DIAGNOSIS — R79.89 LOW TESTOSTERONE IN MALE: ICD-10-CM

## 2025-02-17 DIAGNOSIS — M62.81 MUSCLE WEAKNESS: ICD-10-CM

## 2025-04-02 ENCOUNTER — TELEPHONE (OUTPATIENT)
Dept: GASTROENTEROLOGY | Facility: CLINIC | Age: 55
End: 2025-04-02

## 2025-04-02 NOTE — TELEPHONE ENCOUNTER
Calling to schedule your 1 yr follow up office visit with Ranjana Jenkins of gastroenterology. Please call for an appointment. You are due after the endo of August.

## 2025-05-30 ENCOUNTER — NURSE TRIAGE (OUTPATIENT)
Age: 55
End: 2025-05-30

## 2025-05-30 NOTE — TELEPHONE ENCOUNTER
"REASON FOR CONVERSATION: Leg Pain    SYMPTOMS: L leg pain x10 days after moving heavy object    OTHER HEALTH INFORMATION: denies trauma, swelling. Evelyn in office states no visits available today.    PROTOCOL DISPOSITION: Go to Urgent Care Now (overriding See Within 3 Days in Office)    CARE ADVICE PROVIDED: see for eval at . Call back with any concerns    PRACTICE FOLLOW-UP: none          Reason for Disposition   MODERATE pain (e.g., interferes with normal activities, limping) and present > 3 days    Answer Assessment - Initial Assessment Questions  1. ONSET: \"When did the pain start?\"       10 days ago    2. LOCATION: \"Where is the pain located?\"       L leg    3. PAIN: \"How bad is the pain?\"    (Scale 1-10; or mild, moderate, severe)      3/10 when moving - 7/10 when pressing on area.     4. WORK OR EXERCISE: \"Has there been any recent work or exercise that involved this part of the body?\"       Carrying picnic table    5. CAUSE: \"What do you think is causing the leg pain?\"          Carrying picnic table    6. OTHER SYMPTOMS: \"Do you have any other symptoms?\" (e.g., chest pain, back pain, breathing difficulty, swelling, rash, fever, numbness, weakness)      Radiates to L knee.    Protocols used: Leg Pain-Adult-[place]    "

## 2025-06-18 DIAGNOSIS — K21.9 GASTROESOPHAGEAL REFLUX DISEASE WITHOUT ESOPHAGITIS: ICD-10-CM

## 2025-06-18 RX ORDER — OMEPRAZOLE 20 MG/1
20 CAPSULE, DELAYED RELEASE ORAL DAILY
Qty: 90 CAPSULE | Refills: 1 | Status: SHIPPED | OUTPATIENT
Start: 2025-06-18

## 2025-07-07 DIAGNOSIS — G43.009 MIGRAINE WITHOUT AURA AND WITHOUT STATUS MIGRAINOSUS, NOT INTRACTABLE: ICD-10-CM

## 2025-07-10 ENCOUNTER — OFFICE VISIT (OUTPATIENT)
Dept: NEUROLOGY | Facility: CLINIC | Age: 55
End: 2025-07-10
Payer: COMMERCIAL

## 2025-07-10 VITALS
SYSTOLIC BLOOD PRESSURE: 122 MMHG | TEMPERATURE: 97.7 F | DIASTOLIC BLOOD PRESSURE: 80 MMHG | HEART RATE: 62 BPM | WEIGHT: 232.2 LBS | BODY MASS INDEX: 29.8 KG/M2 | HEIGHT: 74 IN | RESPIRATION RATE: 18 BRPM | OXYGEN SATURATION: 97 %

## 2025-07-10 DIAGNOSIS — G43.009 MIGRAINE WITHOUT AURA AND WITHOUT STATUS MIGRAINOSUS, NOT INTRACTABLE: Primary | ICD-10-CM

## 2025-07-10 DIAGNOSIS — G47.33 OBSTRUCTIVE SLEEP APNEA (ADULT) (PEDIATRIC): ICD-10-CM

## 2025-07-10 DIAGNOSIS — R41.9 COGNITIVE COMPLAINTS: ICD-10-CM

## 2025-07-10 PROCEDURE — 99214 OFFICE O/P EST MOD 30 MIN: CPT | Performed by: NURSE PRACTITIONER

## 2025-07-10 RX ORDER — MAGNESIUM GLUCONATE 27 MG(500)
200 TABLET ORAL DAILY
COMMUNITY

## 2025-07-10 RX ORDER — AMITRIPTYLINE HYDROCHLORIDE 10 MG/1
TABLET ORAL
Qty: 180 TABLET | Refills: 0 | Status: SHIPPED | OUTPATIENT
Start: 2025-07-10

## 2025-07-10 NOTE — ASSESSMENT & PLAN NOTE
Headaches increase in the last visit, he is currently having 3 migraines per month and 3 tension type headaches per week.  With Nurtec, over-the-counter medications are less effective.  He does utilize indomethacin for postcoital headache which is effective.  Discussed how untreated sleep apnea can contribute to, he is currently following with sleep medicine, did not tolerate CPAP, may or may not be considering alternative options.    Will start amitriptyline 10 mg at bedtime for headache prevention, if no improvement in 1 month can increase to 20 mg at bedtime.  He does find he has been having nausea with Nurtec recently, may consider switching to Ubrelvy.   Orders:    amitriptyline (ELAVIL) 10 mg tablet; Take 1 tab at bedtimefor 1 month, if no improvement increase to 2 tabs at bedtime

## 2025-07-10 NOTE — PROGRESS NOTES
Name: Robe Britton      : 1970      MRN: [id number]  Encounter Provider: SERGIO Tovar  Encounter Date: 7/10/2025   Encounter department: [facility]  :  Assessment & Plan  Migraine without aura and without status migrainosus, not intractable  Headaches increase in the last visit, he is currently having 3 migraines per month and 3 tension type headaches per week.  With Nurtec, over-the-counter medications are less effective.  He does utilize indomethacin for postcoital headache which is effective.  Discussed how untreated sleep apnea can contribute to, he is currently following with sleep medicine, did not tolerate CPAP, may or may not be considering alternative options.    Will start amitriptyline 10 mg at bedtime for headache prevention, if no improvement in 1 month can increase to 20 mg at bedtime.  He does find he has been having nausea with Nurtec recently, may consider switching to Ubrelvy.   Orders:    amitriptyline (ELAVIL) 10 mg tablet; Take 1 tab at bedtimefor 1 month, if no improvement increase to 2 tabs at bedtime    Obstructive sleep apnea (adult) (pediatric)  Noted on sleep study, currently follows sleep medicine.       Cognitive complaints  Reports ongoing short-term memory complaints and attention.  Is to function well at home and work on her than having some forgetfulness at times.  No issues with driving.  This is likely related to normal cognitive aging, poor sleep, sleep apnea lifestyle stressors.  Low suspicion for dementia at this time. If any progressive symptoms can obtain Mri brain with NQ. We did discuss further testing with neuropsych as she was concerned about possible ADD/ADHD, deferred for now.              History of Present Illness   HPI   Patient is a 55 year old male who presents for follow up for migraine headaches.     last office visit 2024 in which he was to obtain sleep study.         Interval History:  He did have home sleep study  which showed moderate sleep apnea. Did have follow up with sleep medicine. He is now on cpap, he has been working with different masks to see what works for him. He hasn't been able to find a mask that works with her and was causing his sleep to be more disrupted.   He is no longer using due to this.  Continues to have difficulty maintaining sleep, mainly feels this is due to racing thoughts (to do list), back pain.     He feels migraines have increased along with his tension type headaches.  Migraines 3 per month, left temple  TTH 3 per week band around the head  No new symptoms with headaches.   Nurtec will help within 30 mins -feels he has been having some nausea with nurtec at times  OTC meds-doesn't help as much as nurtec  He does have indomethacin for post coital headaches.   Has been feeling more nausea with headaches, no other new symptoms.       Current Medications:  Nurtec prn  B2, magnesium  Indomethacin  Ibuprofen  Had side effects with triptans in the past.      He feels his memory is the same, still with short term memory complaints and forgetfulness,  No new symptoms  Continues to function well at home and work.   No issues with driving.      for the past 2 years, slightly worsening over this timeframe to become more noticeable.   Short term memory complaints, he has found in conversation he can lost track of what he wants to say or what he is saying.  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   Sleeps 5-7 hours per night, can be interrupted to urinate or due to back pain.   Does snore, has day time fatigue, daytime somnolence   Testosterone is low-is going to see endocrine   Stress levels fluctuate with his job.   No hx of depression/anxiety, is more irritable recently.            B12, vit d tsh normal        prior headache hx:  Frequency:2x/week initially  Description: neck tightness sensation,  tension type, vertex of head  Triggers: perfume,   Change in temperature such as walking from warm to cold air from AC  Or vice versa, talking   severe headaches description:Left sided throbbing more severe, with nausea dry heaving has to lay down- once a month  Worse with exertion  Medications tried: preventative-HCTZ and bystolic for BP  abortive- two excedrin migraine and nap would help dull the headache, nurtec, tylenol     He does have hx of kidney stones requiring stent     CT head/neck 6/2021: No acute intracranial disease.     No large vessel flow restrictive disease within the head or neck.     MRI brain 9/2020: Unremarkable MRI of the brain.  MRI brain 8/2022: Normal.  No change    Review of Systems I have personally reviewed the MA's review of systems and made changes as necessary.         Objective   There were no vitals taken for this visit.    Physical Exam  Constitutional:       General: He is awake.   HENT:      Right Ear: Hearing normal.      Left Ear: Hearing normal.     Eyes:      General: Lids are normal.      Extraocular Movements: Extraocular movements intact.      Pupils: Pupils are equal, round, and reactive to light.       Neurological:      Mental Status: He is alert.      Motor: Motor strength is normal.     Deep Tendon Reflexes:      Reflex Scores:       Bicep reflexes are 2+ on the right side and 2+ on the left side.       Brachioradialis reflexes are 2+ on the right side and 2+ on the left side.       Patellar reflexes are 2+ on the right side and 2+ on the left side.       Achilles reflexes are 2+ on the right side and 2+ on the left side.    Psychiatric:         Speech: Speech normal.       Neurological Exam  Mental Status  Awake and alert. Oriented to person, place, time and situation. Speech is normal. Able to name objects. Follows complex commands.    Cranial Nerves  CN III, IV, VI: Extraocular movements intact bilaterally. Normal lids and orbits bilaterally. Pupils equal round and  reactive to light bilaterally.  CN V:  Right: Facial sensation is normal.  Left: Facial sensation is normal on the left.  CN VII:  Right: There is no facial weakness.  Left: There is no facial weakness.  CN VIII:  Right: Hearing is normal.  Left: Hearing is normal.  CN XI:  Right: Trapezius strength is normal.  Left: Trapezius strength is normal.  CN XII: Tongue midline without atrophy or fasciculations.    Motor   Strength is 5/5 throughout all four extremities.    Sensory  Light touch is normal in upper and lower extremities. Temperature is normal in upper and lower extremities.     Reflexes                                            Right                      Left  Brachioradialis                    2+                         2+  Biceps                                 2+                         2+  Patellar                                2+                         2+  Achilles                                2+                         2+    Coordination  Right: Finger-to-nose normal.Left: Finger-to-nose normal.    Gait  Casual gait is normal including stance, stride, and arm swing. Able to rise from chair without using arms.

## 2025-07-10 NOTE — PATIENT INSTRUCTIONS
Start amitriptyline 10 mg at bedtime if no improvement in headaches in 1 month increase to 20 mg   If side effects call office    Will switch to ubrelvy taken at onset of migraines, can repeat the dose 2 hours if needed

## 2025-08-11 ENCOUNTER — NURSE TRIAGE (OUTPATIENT)
Age: 55
End: 2025-08-11

## (undated) DEVICE — GLOVE SRG BIOGEL 7.5

## (undated) DEVICE — GLOVE SRG BIOGEL 8

## (undated) DEVICE — SPECIMEN CONTAINER STERILE PEEL PACK

## (undated) DEVICE — GUIDEWIRE STRGHT TIP 0.035 IN  SOLO PLUS

## (undated) DEVICE — TUBING EXTENSION 6 FT CONTIN WAVE

## (undated) DEVICE — SYRINGE 10ML LL

## (undated) DEVICE — CATH URETERAL 5FR X 70 CM FLEX TIP POLYUR BARD

## (undated) DEVICE — SYRINGE 20ML LL

## (undated) DEVICE — INTENDED FOR TISSUE SEPARATION, AND OTHER PROCEDURES THAT REQUIRE A SHARP SURGICAL BLADE TO PUNCTURE OR CUT.: Brand: BARD-PARKER ® CARBON RIB-BACK BLADES

## (undated) DEVICE — CATH URET 5FR POLYU TIP W/GW 0.038IN NO SIDE HOLES 70CML W/ADPT STRL DISP

## (undated) DEVICE — 3M™ STERI-STRIP™ REINFORCED ADHESIVE SKIN CLOSURES, R1547, 1/2 IN X 4 IN (12 MM X 100 MM), 6 STRIPS/ENVELOPE: Brand: 3M™ STERI-STRIP™

## (undated) DEVICE — TIBURON SPLIT SHEET: Brand: CONVERTORS

## (undated) DEVICE — 3M™ STERI-DRAPE™ U-DRAPE 1015: Brand: STERI-DRAPE™

## (undated) DEVICE — 3M™ TEGADERM™ TRANSPARENT FILM DRESSING FRAME STYLE, 1624W, 2-3/8 IN X 2-3/4 IN (6 CM X 7 CM), 100/CT 4CT/CASE: Brand: 3M™ TEGADERM™

## (undated) DEVICE — CHLORAPREP HI-LITE 26ML ORANGE

## (undated) DEVICE — TUBING SUCTION 5MM X 12 FT

## (undated) DEVICE — BLADE SHAVER DISSECTOR 4MM 13CM COOLCUT

## (undated) DEVICE — REM POLYHESIVE ADULT PATIENT RETURN ELECTRODE: Brand: VALLEYLAB

## (undated) DEVICE — GLOVE INDICATOR PI UNDERGLOVE SZ 8 BLUE

## (undated) DEVICE — BETHLEHEM UNIVERSAL  ARTHRO PK: Brand: CARDINAL HEALTH

## (undated) DEVICE — SCD SEQUENTIAL COMPRESSION COMFORT SLEEVE MEDIUM KNEE LENGTH: Brand: KENDALL SCD

## (undated) DEVICE — PACK TUR

## (undated) DEVICE — NEEDLE 18 G X 1 1/2

## (undated) DEVICE — GLOVE INDICATOR PI UNDERGLOVE SZ 8.5 BLUE

## (undated) DEVICE — SUT MONOCRYL 2-0 SH 27 IN Y417H

## (undated) DEVICE — COBAN 6 IN STERILE

## (undated) DEVICE — IMPERVIOUS STOCKINETTE: Brand: DEROYAL

## (undated) DEVICE — PUDDLE VAC

## (undated) DEVICE — DRAPE EQUIPMENT RF WAND

## (undated) DEVICE — GLOVE SRG BIOGEL ECLIPSE 7.5

## (undated) DEVICE — LUBRICANT SURGILUBE TUBE 4 OZ  FLIP TOP

## (undated) DEVICE — ABDOMINAL PAD: Brand: DERMACEA

## (undated) DEVICE — URO CATCHER BAG STERILE 0-UC32

## (undated) DEVICE — ACE WRAP 6 IN UNSTERILE

## (undated) DEVICE — CYSTO TUBING TUR Y IRRIGATION

## (undated) DEVICE — BASKET STONE RTRVL ZERO TIP 2.4FR